# Patient Record
Sex: FEMALE | Race: BLACK OR AFRICAN AMERICAN | NOT HISPANIC OR LATINO | Employment: UNEMPLOYED | URBAN - METROPOLITAN AREA
[De-identification: names, ages, dates, MRNs, and addresses within clinical notes are randomized per-mention and may not be internally consistent; named-entity substitution may affect disease eponyms.]

---

## 2017-05-17 ENCOUNTER — TELEPHONE (OUTPATIENT)
Dept: HEMATOLOGY/ONCOLOGY | Facility: CLINIC | Age: 42
End: 2017-05-17

## 2017-05-17 DIAGNOSIS — D47.2 GAMMOPATHY, MONOCLONAL: Primary | ICD-10-CM

## 2017-05-17 RX ORDER — CLOTRIMAZOLE 10 MG/1
LOZENGE ORAL; TOPICAL
Refills: 2 | COMMUNITY
Start: 2017-03-02 | End: 2017-05-17

## 2017-05-17 RX ORDER — HYDROCODONE BITARTRATE AND ACETAMINOPHEN 7.5; 325 MG/1; MG/1
1 TABLET ORAL EVERY 6 HOURS PRN
Qty: 60 TABLET | Refills: 0 | Status: CANCELLED | OUTPATIENT
Start: 2017-05-17

## 2017-05-17 RX ORDER — HYDROCODONE BITARTRATE AND ACETAMINOPHEN 7.5; 325 MG/1; MG/1
TABLET ORAL
Refills: 0 | COMMUNITY
Start: 2017-03-08 | End: 2017-05-17 | Stop reason: CLARIF

## 2017-05-17 NOTE — TELEPHONE ENCOUNTER
----- Message from Michelle Jacob sent at 5/17/2017 10:59 AM CDT -----  Patient requesting RX Refill on Norco 7.5mg #120. Please call when ready Call Back [phone]   Pt. Requesting a refill on her hydro/apap . Looks like you give her 2 scripts at a time. Dated 2 weeks later. Will leave on chart for you to sign.

## 2017-06-07 ENCOUNTER — OFFICE VISIT (OUTPATIENT)
Dept: HEMATOLOGY/ONCOLOGY | Facility: CLINIC | Age: 42
End: 2017-06-07
Payer: MEDICAID

## 2017-06-07 VITALS
HEART RATE: 78 BPM | RESPIRATION RATE: 18 BRPM | WEIGHT: 199 LBS | TEMPERATURE: 99 F | DIASTOLIC BLOOD PRESSURE: 78 MMHG | SYSTOLIC BLOOD PRESSURE: 102 MMHG

## 2017-06-07 DIAGNOSIS — D63.8 ANEMIA OF OTHER CHRONIC DISEASE: ICD-10-CM

## 2017-06-07 DIAGNOSIS — R93.7 ABNORMAL BONE XRAY: ICD-10-CM

## 2017-06-07 DIAGNOSIS — G89.29 CHRONIC MIDLINE LOW BACK PAIN WITHOUT SCIATICA: ICD-10-CM

## 2017-06-07 DIAGNOSIS — M54.50 CHRONIC MIDLINE LOW BACK PAIN WITHOUT SCIATICA: ICD-10-CM

## 2017-06-07 PROCEDURE — 99214 OFFICE O/P EST MOD 30 MIN: CPT | Mod: ,,, | Performed by: INTERNAL MEDICINE

## 2017-06-07 RX ORDER — DULOXETIN HYDROCHLORIDE 60 MG/1
CAPSULE, DELAYED RELEASE ORAL
COMMUNITY
Start: 2017-06-01 | End: 2018-04-25 | Stop reason: SDUPTHER

## 2017-06-07 RX ORDER — LORATADINE 10 MG/1
TABLET ORAL
COMMUNITY
Start: 2017-05-18 | End: 2018-04-25 | Stop reason: SDUPTHER

## 2017-06-07 RX ORDER — TOPIRAMATE 100 MG/1
TABLET, FILM COATED ORAL
COMMUNITY
Start: 2017-05-18 | End: 2018-04-25 | Stop reason: SDUPTHER

## 2017-06-07 RX ORDER — TRAZODONE HYDROCHLORIDE 100 MG/1
TABLET ORAL
COMMUNITY
Start: 2017-06-01 | End: 2018-04-25 | Stop reason: SDUPTHER

## 2017-06-07 NOTE — LETTER
June 7, 2017      Alexandra Guerra MD  [address]  [facility] [place] [address]           [facility] Hematology Oncology  [address] [place] [address] [place] [address]          Patient: Robina Potter   MR Number: [id number]   YOB: 1975   Date of Visit: 6/7/2017       Dear Dr. Alexandra Guerra:    Thank you for referring Robina Potter to me for evaluation. Attached you will find relevant portions of my assessment and plan of care.    If you have questions, please do not hesitate to call me. I look forward to following Robina Potter along with you.    Sincerely,    SEAN Mccain MD    Enclosure  CC:  No Recipients    If you would like to receive this communication electronically, please contact [email or web address] or [phone] to request more information on [employer] Link access.    For providers and/or their staff who would like to refer a patient to [facility], please contact us through our one-stop-shop provider referral line, [facility], at [phone].    If you feel you have received this communication in error or would no longer like to receive these types of communications, please e-mail [email or web address]

## 2017-06-08 NOTE — PROGRESS NOTES
[facility] Follow-up    Subjective:      Patient ID:   Robina Potter  41 y.o. female  1975      Chief Complaint:   Anemia, LBP    HPI:  41 y.o. female with anemia 2nd decreased cellularity of bone marrow.  Chronic low back pain sx.  Evaluation for malignancy negative.  Evaluation per Dr. Melchor negative.  GERD +.  Pyelonephritis +.  Fibroids hx.    Smoke 1/3 ppd.  Etoh no.  Job, security worker.    Cholecystectomy +, M0.    Mom SLE.  1 child cerebral palsy.              ROS:   GEN: normal without any fever, night sweats or weight loss  HEENT: normal with no HA's, sore throat, stiff neck, changes in vision  CV: normal with no CP, SOB, PND, SIMON or orthopnea  PULM: normal with no SOB, cough, hemoptysis, sputum or pleuritic pain  GI: GERD.   no abdominal pain, nausea, vomiting, constipation, diarrhea, melanotic stools, BRBPR, or hematemesis  : Pyelonephritis.   no hematuria, dysuria  BREAST: normal with no mass, discharge, pain  SKIN: normal with no rash, erythema.    Review of patient's allergies indicates:  No Known Allergies    Current Outpatient Prescriptions:     duloxetine (CYMBALTA) 60 MG capsule, , Disp: , Rfl:     hydrocodone-acetaminophen (VICODIN) 5-500 mg per tablet, , Disp: , Rfl:     loratadine (CLARITIN) 10 mg tablet, , Disp: , Rfl:     topiramate (TOPAMAX) 100 MG tablet, , Disp: , Rfl:     trazodone (DESYREL) 100 MG tablet, , Disp: , Rfl:           Objective:   Vitals:  Blood pressure 102/78, pulse 78, temperature 98.8 °F (37.1 °C), resp. rate 18, weight 90.3 kg (199 lb).    Physical Examination:   GEN: no apparent distress, comfortable; AAOx3  HEAD: atraumatic and normocephalic  EYES: no pallor, no icterus, PERRLA  ENT: OMM, no pharyngeal erythema, external ears WNL; no nasal discharge; no thrush  NECK: no masses, thyroid normal, trachea midline, no LAD/LN's, supple  CV: RRR with no murmur; normal pulse; normal S1 and S2; no pedal edema  CHEST: Normal respiratory effort; CTAB; normal  breath sounds; no wheeze or crackles  ABDOM: nontender and nondistended; soft; normal bowel sounds; no rebound/guarding  MUSC/Skeletal: ROM normal; no crepitus; joints normal; no deformities or arthropathy  EXTREM: no clubbing, cyanosis, inflammation or swelling  SKIN: no rashes, lesions, ulcers, petechiae or subcutaneous nodules  : no novak  NEURO: grossly intact; motor/sensory WNL; AAOx3; no tremors  PSYCH: normal mood, affect and behavior  LYMPH: normal cervical, supraclavicular, axillary and groin LN's      Labs:     None recently      Radiology/Diagnostic Studies:    None recently          Assessment:   (1) 41 y.o. female with chronic anemia secondary to decreased bone marrow cellularity.    (2)Chronic LBP.  Refill meds.  RTC 1 month.          1. Anemia of other chronic disease    2. Chronic midline low back pain without sciatica    3. Abnormal bone xray        Plan:       Anemia of other chronic disease    Chronic midline low back pain without sciatica    Abnormal bone xray          I have explained and the patient understands all of  the current recommendation(s). I have answered all of their questions to the best of my ability and to their complete satisfaction.

## 2017-07-12 RX ORDER — HYDROCODONE BITARTRATE AND ACETAMINOPHEN 7.5; 325 MG/1; MG/1
TABLET ORAL
COMMUNITY
Start: 2017-01-25 | End: 2018-04-25 | Stop reason: SDUPTHER

## 2017-07-12 RX ORDER — LORATADINE 10 MG/1
TABLET ORAL
COMMUNITY
Start: 2016-11-09 | End: 2022-07-07

## 2017-07-13 ENCOUNTER — OFFICE VISIT (OUTPATIENT)
Dept: HEMATOLOGY/ONCOLOGY | Facility: CLINIC | Age: 42
End: 2017-07-13
Payer: MEDICAID

## 2017-07-13 VITALS
HEART RATE: 76 BPM | WEIGHT: 217 LBS | TEMPERATURE: 97 F | SYSTOLIC BLOOD PRESSURE: 118 MMHG | DIASTOLIC BLOOD PRESSURE: 78 MMHG

## 2017-07-13 DIAGNOSIS — M54.50 CHRONIC MIDLINE LOW BACK PAIN WITHOUT SCIATICA: ICD-10-CM

## 2017-07-13 DIAGNOSIS — D63.8 ANEMIA OF OTHER CHRONIC DISEASE: Primary | ICD-10-CM

## 2017-07-13 DIAGNOSIS — G89.29 CHRONIC MIDLINE LOW BACK PAIN WITHOUT SCIATICA: ICD-10-CM

## 2017-07-13 PROCEDURE — 99214 OFFICE O/P EST MOD 30 MIN: CPT | Mod: ,,, | Performed by: INTERNAL MEDICINE

## 2017-07-14 NOTE — PROGRESS NOTES
[facility] Follow-up    Subjective:      Patient ID:   Robina Potter  41 y.o. female  1975      Chief Complaint:   Anemia, LBP    HPI:  41 y.o. female with anemia 2nd decreased cellularity of bone marrow.  Chronic low back pain sx.  Evaluation for malignancy negative.  Evaluation per Dr. Melchor negative.  GERD +.  Pyelonephritis +.  Fibroids hx.    Smoke 1/3 ppd.  Etoh no.  Job, security worker.    Cholecystectomy +, M0.    Mom SLE.  1 child cerebral palsy.      ROS:   GEN: normal without any fever, night sweats or weight loss  HEENT: normal with no HA's, sore throat, stiff neck, changes in vision  CV: normal with no CP, SOB, PND, SIMON or orthopnea  PULM: normal with no SOB, cough, hemoptysis, sputum or pleuritic pain  GI: GERD.   no abdominal pain, nausea, vomiting, constipation, diarrhea, melanotic stools, BRBPR, or hematemesis  : Pyelonephritis.   no hematuria, dysuria  BREAST: normal with no mass, discharge, pain  SKIN: normal with no rash, erythema.    Review of patient's allergies indicates:  No Known Allergies    Current Outpatient Prescriptions:     duloxetine (CYMBALTA) 60 MG capsule, , Disp: , Rfl:     hydrocodone-acetaminophen (VICODIN) 5-500 mg per tablet, , Disp: , Rfl:     hydrocodone-acetaminophen 7.5-325mg (NORCO) 7.5-325 mg per tablet, , Disp: , Rfl:     loratadine (CLARITIN) 10 mg tablet, , Disp: , Rfl:     loratadine (CLARITIN) 10 mg tablet, , Disp: , Rfl:     topiramate (TOPAMAX) 100 MG tablet, , Disp: , Rfl:     trazodone (DESYREL) 100 MG tablet, , Disp: , Rfl:           Objective:   Vitals:  Blood pressure 118/78, pulse 76, temperature 97.3 °F (36.3 °C), weight 98.4 kg (217 lb).    Physical Examination:   GEN: no apparent distress, comfortable; AAOx3  HEAD: atraumatic and normocephalic  EYES: no pallor, no icterus, PERRLA  ENT: OMM, no pharyngeal erythema, external ears WNL; no nasal discharge; no thrush  NECK: no masses, thyroid normal, trachea midline, no LAD/LN's,  supple  CV: RRR with no murmur; normal pulse; normal S1 and S2; no pedal edema  CHEST: Normal respiratory effort; CTAB; normal breath sounds; no wheeze or crackles  ABDOM: nontender and nondistended; soft; normal bowel sounds; no rebound/guarding  MUSC/Skeletal: ROM normal; no crepitus; joints normal; no deformities or arthropathy  EXTREM: no clubbing, cyanosis, inflammation or swelling  SKIN: no rashes, lesions, ulcers, petechiae or subcutaneous nodules  : no novak  NEURO: grossly intact; motor/sensory WNL; AAOx3; no tremors  PSYCH: normal mood, affect and behavior  LYMPH: normal cervical, supraclavicular, axillary and groin LN's      Labs:     None recently      Radiology/Diagnostic Studies:    None recently          Assessment:   (1) 41 y.o. female with chronic anemia secondary to decreased bone marrow cellularity.    (2)Chronic LBP.  Refill meds.  RTC 1 month.             Plan:       I have explained and the patient understands all of  the current recommendation(s). I have answered all of their questions to the best of my ability and to their complete satisfaction.

## 2017-08-30 ENCOUNTER — OFFICE VISIT (OUTPATIENT)
Dept: HEMATOLOGY/ONCOLOGY | Facility: CLINIC | Age: 42
End: 2017-08-30
Payer: MEDICAID

## 2017-08-30 VITALS
SYSTOLIC BLOOD PRESSURE: 106 MMHG | WEIGHT: 219 LBS | RESPIRATION RATE: 20 BRPM | DIASTOLIC BLOOD PRESSURE: 64 MMHG | HEART RATE: 69 BPM | TEMPERATURE: 97 F

## 2017-08-30 DIAGNOSIS — M54.50 CHRONIC MIDLINE LOW BACK PAIN WITHOUT SCIATICA: ICD-10-CM

## 2017-08-30 DIAGNOSIS — G89.29 CHRONIC MIDLINE LOW BACK PAIN WITHOUT SCIATICA: ICD-10-CM

## 2017-08-30 DIAGNOSIS — R93.7 ABNORMAL BONE XRAY: ICD-10-CM

## 2017-08-30 DIAGNOSIS — D63.8 ANEMIA OF OTHER CHRONIC DISEASE: Primary | ICD-10-CM

## 2017-08-30 PROCEDURE — 99213 OFFICE O/P EST LOW 20 MIN: CPT | Mod: ,,, | Performed by: INTERNAL MEDICINE

## 2017-08-30 RX ORDER — FLUTICASONE PROPIONATE 50 MCG
SPRAY, SUSPENSION (ML) NASAL
COMMUNITY
Start: 2017-08-23 | End: 2018-04-25 | Stop reason: SDUPTHER

## 2017-09-03 NOTE — PROGRESS NOTES
[facility] Follow-up    Subjective:      Patient ID:   Robina Potter  41 y.o. female  1975      Chief Complaint:   Anemia, LBP    HPI:  41 y.o. female with anemia 2nd decreased cellularity of bone marrow.  Chronic low back pain sx.  Evaluation for malignancy negative.  Evaluation per Dr. Melchor negative.  GERD +.  Pyelonephritis +.  Fibroids hx.    Smoke 1/3 ppd.  Etoh no.  Job, security worker.    Cholecystectomy +, M0.    Mom SLE.  1 child cerebral palsy.    Being evaluated per  MD for recurrant UTIs.  Likely will come to cystoscopy.      ROS:   GEN: normal without any fever, night sweats or weight loss  HEENT: normal with no HA's, sore throat, stiff neck, changes in vision  CV: normal with no CP, SOB, PND, SIMON or orthopnea  PULM: normal with no SOB, cough, hemoptysis, sputum or pleuritic pain  GI: GERD.   no abdominal pain, nausea, vomiting, constipation, diarrhea, melanotic stools, BRBPR, or hematemesis  : Pyelonephritis.   no hematuria, dysuria  BREAST: normal with no mass, discharge, pain  SKIN: normal with no rash, erythema.    Review of patient's allergies indicates:  No Known Allergies    Current Outpatient Prescriptions:     duloxetine (CYMBALTA) 60 MG capsule, , Disp: , Rfl:     fluticasone (FLONASE) 50 mcg/actuation nasal spray, , Disp: , Rfl:     hydrocodone-acetaminophen (VICODIN) 5-500 mg per tablet, , Disp: , Rfl:     hydrocodone-acetaminophen 7.5-325mg (NORCO) 7.5-325 mg per tablet, , Disp: , Rfl:     loratadine (CLARITIN) 10 mg tablet, , Disp: , Rfl:     loratadine (CLARITIN) 10 mg tablet, , Disp: , Rfl:     trazodone (DESYREL) 100 MG tablet, , Disp: , Rfl:     topiramate (TOPAMAX) 100 MG tablet, , Disp: , Rfl:           Objective:   Vitals:  Blood pressure 106/64, pulse 69, temperature 97.2 °F (36.2 °C), resp. rate 20, weight 99.3 kg (219 lb).    Physical Examination:   GEN: no apparent distress, comfortable; AAOx3  HEAD: atraumatic and normocephalic  EYES: no pallor, no  icterus, PERRLA  ENT: OMM, no pharyngeal erythema, external ears WNL; no nasal discharge; no thrush  NECK: no masses, thyroid normal, trachea midline, no LAD/LN's, supple  CV: RRR with no murmur; normal pulse; normal S1 and S2; no pedal edema  CHEST: Normal respiratory effort; CTAB; normal breath sounds; no wheeze or crackles  ABDOM: nontender and nondistended; soft; normal bowel sounds; no rebound/guarding  MUSC/Skeletal: ROM normal; no crepitus; joints normal; no deformities or arthropathy  EXTREM: no clubbing, cyanosis, inflammation or swelling  SKIN: no rashes, lesions, ulcers, petechiae or subcutaneous nodules  : no CVAT.  NEURO: grossly intact; motor/sensory WNL; AAOx3; no tremors  PSYCH: normal mood, affect and behavior  LYMPH: normal cervical, supraclavicular, axillary and groin LN's      Labs:     None recently      Radiology/Diagnostic Studies:    None recently          Assessment:   (1) 41 y.o. female with chronic anemia secondary to decreased bone marrow cellularity.    (2)Chronic LBP.  Refill meds.  RTC 1 months.    (3)Hx of pyelonephritis, recurrent UTIs.   evaluation pending.       Plan:     II have explained and the patient understands all of  the current recommendation(s). I have answered all of their questions to the best of my ability and to their complete satisfaction.

## 2017-09-28 ENCOUNTER — OFFICE VISIT (OUTPATIENT)
Dept: HEMATOLOGY/ONCOLOGY | Facility: CLINIC | Age: 42
End: 2017-09-28
Payer: MEDICAID

## 2017-09-28 VITALS
RESPIRATION RATE: 18 BRPM | TEMPERATURE: 98 F | DIASTOLIC BLOOD PRESSURE: 70 MMHG | WEIGHT: 213 LBS | HEART RATE: 74 BPM | SYSTOLIC BLOOD PRESSURE: 106 MMHG

## 2017-09-28 DIAGNOSIS — C82.91 NODULAR LYMPHOMA OF LYMPH NODES OF HEAD, FACE AND NECK: ICD-10-CM

## 2017-09-28 PROCEDURE — 99213 OFFICE O/P EST LOW 20 MIN: CPT | Mod: ,,, | Performed by: INTERNAL MEDICINE

## 2017-09-28 RX ORDER — CETIRIZINE HYDROCHLORIDE 10 MG/1
TABLET ORAL
COMMUNITY
Start: 2016-10-27 | End: 2022-07-07

## 2017-09-28 RX ORDER — HYDROCODONE BITARTRATE AND ACETAMINOPHEN 7.5; 325 MG/1; MG/1
TABLET ORAL
COMMUNITY
Start: 2017-01-25 | End: 2018-04-25 | Stop reason: SDUPTHER

## 2017-09-28 RX ORDER — CLOTRIMAZOLE 10 MG/1
LOZENGE ORAL; TOPICAL
COMMUNITY
Start: 2017-03-02 | End: 2022-07-07

## 2017-09-28 RX ORDER — TRAZODONE HYDROCHLORIDE 100 MG/1
TABLET ORAL
COMMUNITY
Start: 2017-08-01 | End: 2022-07-07

## 2017-09-28 RX ORDER — DULOXETIN HYDROCHLORIDE 60 MG/1
CAPSULE, DELAYED RELEASE ORAL
COMMUNITY
Start: 2017-08-01 | End: 2018-04-25 | Stop reason: SDUPTHER

## 2017-09-28 RX ORDER — FLUTICASONE PROPIONATE 50 MCG
SPRAY, SUSPENSION (ML) NASAL
COMMUNITY
Start: 2017-01-11 | End: 2022-07-07

## 2017-09-28 RX ORDER — TOPIRAMATE 100 MG/1
TABLET, FILM COATED ORAL
COMMUNITY
Start: 2017-05-18 | End: 2022-07-07

## 2017-10-07 PROBLEM — C82.91 NODULAR LYMPHOMA OF LYMPH NODES OF HEAD, FACE AND NECK: Status: ACTIVE | Noted: 2017-10-07

## 2017-10-08 NOTE — PROGRESS NOTES
[facility] Follow-up    Subjective:      Patient ID:   Robina Potter  42 y.o. female  1975      Chief Complaint:   Anemia, LBP    HPI:  42 y.o. female with anemia 2nd decreased cellularity of bone marrow.  Chronic low back pain sx.  Evaluation for malignancy negative.  Evaluation per Dr. Melchor negative.  GERD +.  Pyelonephritis +.  Fibroids hx.    Smoke 1/3 ppd.  Etoh no.  Job, security worker.    Cholecystectomy +, M0.    Mom SLE.  1 child cerebral palsy.    Being evaluated per  MD for recurrant UTIs.  Likely will come to cystoscopy.      ROS:   GEN: normal without any fever, night sweats or weight loss  HEENT: normal with no HA's, sore throat, stiff neck, changes in vision  CV: normal with no CP, SOB, PND, SIMON or orthopnea  PULM: normal with no SOB, cough, hemoptysis, sputum or pleuritic pain  GI: GERD.   no abdominal pain, nausea, vomiting, constipation, diarrhea, melanotic stools, BRBPR, or hematemesis  : Pyelonephritis.   no hematuria, dysuria  BREAST: normal with no mass, discharge, pain  SKIN: normal with no rash, erythema.    Review of patient's allergies indicates:  No Known Allergies    Current Outpatient Prescriptions:     cetirizine (ZYRTEC) 10 MG tablet, , Disp: , Rfl:     fluticasone (FLONASE) 50 mcg/actuation nasal spray, , Disp: , Rfl:     hydrocodone-acetaminophen 7.5-325mg (NORCO) 7.5-325 mg per tablet, , Disp: , Rfl:     loratadine (CLARITIN) 10 mg tablet, , Disp: , Rfl:     topiramate (TOPAMAX) 100 MG tablet, , Disp: , Rfl:     trazodone (DESYREL) 100 MG tablet, , Disp: , Rfl:     clotrimazole (MYCELEX) 10 mg lazaro, , Disp: , Rfl:     duloxetine (CYMBALTA) 60 MG capsule, , Disp: , Rfl:     duloxetine (CYMBALTA) 60 MG capsule, , Disp: , Rfl:     fluticasone (FLONASE) 50 mcg/actuation nasal spray, , Disp: , Rfl:     hydrocodone-acetaminophen (VICODIN) 5-500 mg per tablet, , Disp: , Rfl:     hydrocodone-acetaminophen 7.5-325mg (NORCO) 7.5-325 mg per tablet, , Disp: ,  Rfl:     loratadine (CLARITIN) 10 mg tablet, , Disp: , Rfl:     topiramate (TOPAMAX) 100 MG tablet, , Disp: , Rfl:     trazodone (DESYREL) 100 MG tablet, , Disp: , Rfl:           Objective:   Vitals:  Blood pressure 106/70, pulse 74, temperature 98.3 °F (36.8 °C), resp. rate 18, weight 96.6 kg (213 lb).    Physical Examination:   GEN: no apparent distress, comfortable  HEAD: atraumatic and normocephalic  EYES: no pallor, no icterus  ENT: no pharyngeal erythema, external ears WNL; no nasal discharge  NECK: no masses, thyroid normal, trachea midline, no LAD/LN's, supple  CV: RRR with no murmur; normal pulse; normal S1 and S2; no pedal edema  CHEST: Normal respiratory effort; CTAB; normal breath sounds; no wheeze or crackles  ABDOM: nontender and nondistended; soft; normal bowel sounds; no rebound/guarding  MUSC/Skeletal: ROM normal; no crepitus; joints normal; no deformities or arthropathy  EXTREM: no clubbing, cyanosis, inflammation   SKIN: no rashes, lesions, ulcers, petechiae  : no CVAT.  NEURO: grossly intact; motor/sensory WNL; no tremors  PSYCH: normal mood, affect and behavior  LYMPH: normal cervical, supraclavicular, axillary and groin LN's      Labs:     None recently      Radiology/Diagnostic Studies:    None recently          Assessment:   (1) 42 y.o. female with chronic anemia secondary to decreased bone marrow cellularity.    (2)Chronic LBP.  Refill meds.  RTC 1 months.    (3)Hx of pyelonephritis, recurrent UTIs.   evaluation pending.

## 2017-11-22 ENCOUNTER — OFFICE VISIT (OUTPATIENT)
Dept: HEMATOLOGY/ONCOLOGY | Facility: CLINIC | Age: 42
End: 2017-11-22
Payer: MEDICAID

## 2017-11-22 DIAGNOSIS — N39.0 RECURRENT UTI (URINARY TRACT INFECTION): ICD-10-CM

## 2017-11-22 PROCEDURE — 99213 OFFICE O/P EST LOW 20 MIN: CPT | Mod: ,,, | Performed by: INTERNAL MEDICINE

## 2017-11-22 RX ORDER — DULOXETIN HYDROCHLORIDE 60 MG/1
60 CAPSULE, DELAYED RELEASE ORAL
COMMUNITY
Start: 2017-10-10 | End: 2018-04-25 | Stop reason: SDUPTHER

## 2017-11-22 RX ORDER — HYDROXYZINE PAMOATE 25 MG/1
25 CAPSULE ORAL
COMMUNITY
Start: 2017-10-10 | End: 2018-04-25 | Stop reason: SDUPTHER

## 2017-11-23 NOTE — PROGRESS NOTES
[facility] Follow-up    Subjective:      Patient ID:   Robina Potter  42 y.o. female  1975      Chief Complaint:   Anemia, LBP    HPI:  42 y.o. female with anemia 2nd decreased cellularity of bone marrow.  Chronic low back pain sx.  Evaluation for malignancy negative.  Evaluation per Dr. Melchor negative.  GERD +.  Pyelonephritis +.  Fibroids hx.  Recurrant UTI.  Cystoscopy done, results pending.  Dr. Strong treated her for depression.    Smoke 1/3 ppd.  Etoh no.  Job, security worker.    Cholecystectomy +, M0.    Mom SLE.  1 child cerebral palsy.    Being evaluated per  MD for recurrant UTIs. Cystoscopy results pending.      ROS:   GEN: normal without any fever, night sweats or weight loss  HEENT: normal with no HA's, sore throat, stiff neck, changes in vision  CV: normal with no CP, SOB, PND, SIMON or orthopnea  PULM: normal with no SOB, cough, hemoptysis, sputum or pleuritic pain  GI: GERD.   no abdominal pain, nausea, vomiting, constipation, diarrhea, melanotic stools, BRBPR, or hematemesis  : Pyelonephritis hx. SEE HPI.  BREAST: normal with no mass, discharge, pain  SKIN: normal with no rash, erythema.    Review of patient's allergies indicates:  No Known Allergies    Current Outpatient Prescriptions:     DULoxetine (CYMBALTA) 60 MG capsule, Take 60 mg by mouth., Disp: , Rfl:     hydrOXYzine pamoate (VISTARIL) 25 MG Cap, Take 25 mg by mouth., Disp: , Rfl:     cetirizine (ZYRTEC) 10 MG tablet, , Disp: , Rfl:     clotrimazole (MYCELEX) 10 mg lazaro, , Disp: , Rfl:     duloxetine (CYMBALTA) 60 MG capsule, , Disp: , Rfl:     duloxetine (CYMBALTA) 60 MG capsule, , Disp: , Rfl:     fluticasone (FLONASE) 50 mcg/actuation nasal spray, , Disp: , Rfl:     fluticasone (FLONASE) 50 mcg/actuation nasal spray, , Disp: , Rfl:     hydrocodone-acetaminophen (VICODIN) 5-500 mg per tablet, , Disp: , Rfl:     hydrocodone-acetaminophen 7.5-325mg (NORCO) 7.5-325 mg per tablet, , Disp: , Rfl:      hydrocodone-acetaminophen 7.5-325mg (NORCO) 7.5-325 mg per tablet, , Disp: , Rfl:     hydrOXYzine pamoate (VISTARIL) 25 MG Cap, , Disp: , Rfl:     loratadine (CLARITIN) 10 mg tablet, , Disp: , Rfl:     loratadine (CLARITIN) 10 mg tablet, , Disp: , Rfl:     sulfamethoxazole-trimethoprim 800-160mg (BACTRIM DS) 800-160 mg Tab, , Disp: , Rfl:     topiramate (TOPAMAX) 100 MG tablet, , Disp: , Rfl:     topiramate (TOPAMAX) 100 MG tablet, , Disp: , Rfl:     trazodone (DESYREL) 100 MG tablet, , Disp: , Rfl:     trazodone (DESYREL) 100 MG tablet, , Disp: , Rfl:           Objective:   Vitals:  There were no vitals taken for this visit.    Physical Examination:   GEN: no apparent distress, comfortable  HEAD: atraumatic and normocephalic  EYES: no pallor, no icterus  ENT: no pharyngeal erythema, external ears WNL; no nasal discharge  NECK: no masses, thyroid normal, trachea midline, no LAD/LN's, supple  CV: RRR with no murmur; normal pulse; normal S1 and S2; no pedal edema  CHEST: Normal respiratory effort; CTAB; normal breath sounds; no wheeze or crackles  ABDOM: nontender and nondistended; soft; normal bowel sounds; no rebound/guarding  MUSC/Skeletal: ROM normal; no crepitus; joints normal; no deformities or arthropathy  EXTREM: no clubbing, cyanosis, inflammation   SKIN: no rashes, lesions, ulcers, petechiae  : no CVAT.  NEURO: grossly intact; motor/sensory WNL; no tremors  PSYCH: normal mood, affect and behavior  LYMPH: normal cervical, supraclavicular, axillary and groin LN's      Labs:     None recently      Radiology/Diagnostic Studies:    None recently          Assessment:   (1) 42 y.o. female with chronic anemia secondary to decreased bone marrow cellularity.    (2)Chronic LBP.  Refill meds.  RTC 1 months.    (3)Hx of pyelonephritis, recurrent UTIs.   evaluation pending.

## 2017-12-06 VITALS
RESPIRATION RATE: 20 BRPM | DIASTOLIC BLOOD PRESSURE: 64 MMHG | TEMPERATURE: 98 F | HEART RATE: 74 BPM | WEIGHT: 212 LBS | SYSTOLIC BLOOD PRESSURE: 99 MMHG

## 2017-12-27 ENCOUNTER — OFFICE VISIT (OUTPATIENT)
Dept: HEMATOLOGY/ONCOLOGY | Facility: CLINIC | Age: 42
End: 2017-12-27
Payer: MEDICAID

## 2017-12-27 VITALS
DIASTOLIC BLOOD PRESSURE: 86 MMHG | TEMPERATURE: 98 F | HEART RATE: 64 BPM | RESPIRATION RATE: 18 BRPM | SYSTOLIC BLOOD PRESSURE: 127 MMHG | WEIGHT: 225 LBS

## 2017-12-27 DIAGNOSIS — G89.29 CHRONIC MIDLINE LOW BACK PAIN WITHOUT SCIATICA: ICD-10-CM

## 2017-12-27 DIAGNOSIS — R93.7 ABNORMAL BONE XRAY: ICD-10-CM

## 2017-12-27 DIAGNOSIS — M54.50 CHRONIC MIDLINE LOW BACK PAIN WITHOUT SCIATICA: ICD-10-CM

## 2017-12-27 DIAGNOSIS — D63.8 ANEMIA, CHRONIC DISEASE: Primary | ICD-10-CM

## 2017-12-27 DIAGNOSIS — N39.0 RECURRENT UTI (URINARY TRACT INFECTION): ICD-10-CM

## 2017-12-27 PROCEDURE — 99213 OFFICE O/P EST LOW 20 MIN: CPT | Mod: ,,, | Performed by: INTERNAL MEDICINE

## 2017-12-27 NOTE — LETTER
December 27, 2017      Alexandra Guerra MD  [address]  [place]  [place] [address]           [facility] Hematology Oncology  [address]  [place] [address]  Phone: [phone]  Fax: [phone]          Patient: Robina Potter   MR Number: [id number]   YOB: 1975   Date of Visit: 12/27/2017       Dear Dr. Alexandra uGerra:    Thank you for referring Robina Potter to me for evaluation. Attached you will find relevant portions of my assessment and plan of care.    If you have questions, please do not hesitate to call me. I look forward to following Robina Potter along with you.    Sincerely,    SEAN Mccain MD    Enclosure  CC:  No Recipients    If you would like to receive this communication electronically, please contact [email or web address] or [phone] to request more information on [employer] Link access.    For providers and/or their staff who would like to refer a patient to [facility], please contact us through our one-stop-shop provider referral line, [facility], at [phone].    If you feel you have received this communication in error or would no longer like to receive these types of communications, please e-mail [email or web address]

## 2017-12-27 NOTE — PROGRESS NOTES
[facility] Follow-up    Subjective:      Patient ID:   Robina Potter                                       [address]  42 y.o. female                                           [place] [address]  1975  Dev Strong    Chief Complaint:   Anemia, LBP    HPI:  42 y.o. female with anemia 2nd decreased cellularity of bone marrow.  Chronic low back pain sx.  Evaluation for malignancy negative.  Evaluation per Dr. Melchor negative.  GERD +.  Pyelonephritis +.  Fibroids hx.  Recurrant UTI.  Cystoscopy done, results pending.  Dr. Méndez follow up 18.  Dr. Strong treated her for depression.    Smoke 1/3 ppd.  Etoh no.  Job, security worker.    Cholecystectomy +, M0.    Mom SLE.  1 child cerebral palsy.      ROS:   GEN: normal without any fever, night sweats or weight loss  HEENT: normal with no HA's, sore throat, stiff neck, changes in vision  CV: normal with no CP, SOB, PND, SIMON or orthopnea  PULM: normal with no SOB, cough, hemoptysis, sputum or pleuritic pain  GI: GERD.   no abdominal pain, nausea, vomiting, constipation, diarrhea, melanotic stools, BRBPR, or hematemesis  : Pyelonephritis hx. SEE HPI.  BREAST: normal with no mass, discharge, pain  SKIN: normal with no rash, erythema.    Review of patient's allergies indicates:  No Known Allergies    Current Outpatient Prescriptions:     cetirizine (ZYRTEC) 10 MG tablet, , Disp: , Rfl:     clotrimazole (MYCELEX) 10 mg lazaro, , Disp: , Rfl:     duloxetine (CYMBALTA) 60 MG capsule, , Disp: , Rfl:     duloxetine (CYMBALTA) 60 MG capsule, , Disp: , Rfl:     DULoxetine (CYMBALTA) 60 MG capsule, Take 60 mg by mouth., Disp: , Rfl:     fluticasone (FLONASE) 50 mcg/actuation nasal spray, , Disp: , Rfl:     fluticasone (FLONASE) 50 mcg/actuation nasal spray, , Disp: , Rfl:     hydrocodone-acetaminophen (VICODIN) 5-500 mg per tablet, , Disp: , Rfl:     hydrocodone-acetaminophen 7.5-325mg (NORCO) 7.5-325 mg per tablet, , Disp: , Rfl:      hydrocodone-acetaminophen 7.5-325mg (NORCO) 7.5-325 mg per tablet, , Disp: , Rfl:     hydrOXYzine pamoate (VISTARIL) 25 MG Cap, , Disp: , Rfl:     hydrOXYzine pamoate (VISTARIL) 25 MG Cap, Take 25 mg by mouth., Disp: , Rfl:     loratadine (CLARITIN) 10 mg tablet, , Disp: , Rfl:     loratadine (CLARITIN) 10 mg tablet, , Disp: , Rfl:     sulfamethoxazole-trimethoprim 800-160mg (BACTRIM DS) 800-160 mg Tab, , Disp: , Rfl:     topiramate (TOPAMAX) 100 MG tablet, , Disp: , Rfl:     topiramate (TOPAMAX) 100 MG tablet, , Disp: , Rfl:     trazodone (DESYREL) 100 MG tablet, , Disp: , Rfl:     trazodone (DESYREL) 100 MG tablet, , Disp: , Rfl:           Objective:   Vitals:  There were no vitals taken for this visit.    Physical Examination:   GEN: no apparent distress, comfortable  HEAD: atraumatic and normocephalic  EYES: no pallor, no icterus  ENT: no pharyngeal erythema, external ears WNL; no nasal discharge  NECK: no masses, thyroid normal, trachea midline, no LAD/LN's, supple  CV: RRR with no murmur; normal pulse; normal S1 and S2; no pedal edema  CHEST: Normal respiratory effort; CTAB; normal breath sounds; no wheeze or crackles  ABDOM: nontender and nondistended; soft; normal bowel sounds; no rebound/guarding  MUSC/Skeletal: ROM normal; no crepitus; joints normal; no deformities or arthropathy  EXTREM: no clubbing, cyanosis, inflammation   SKIN: no rashes, lesions, ulcers, petechiae  : no CVAT.  NEURO: grossly intact; motor/sensory WNL; no tremors  PSYCH: normal mood, affect and behavior  LYMPH: normal cervical, supraclavicular, axillary and groin LN's      Labs:     None recently      Radiology/Diagnostic Studies:  pending        Assessment:   (1) 42 y.o. female with chronic anemia secondary to decreased bone marrow cellularity.    (2)Chronic LBP.  Refill meds.  RTC 1 months.    (3)Hx of pyelonephritis, recurrent UTIs.   evaluation pending.

## 2018-01-25 ENCOUNTER — OFFICE VISIT (OUTPATIENT)
Dept: HEMATOLOGY/ONCOLOGY | Facility: CLINIC | Age: 43
End: 2018-01-25
Payer: MEDICAID

## 2018-01-25 VITALS
WEIGHT: 216 LBS | HEART RATE: 82 BPM | SYSTOLIC BLOOD PRESSURE: 118 MMHG | DIASTOLIC BLOOD PRESSURE: 70 MMHG | TEMPERATURE: 98 F | RESPIRATION RATE: 20 BRPM

## 2018-01-25 DIAGNOSIS — D63.8 ANEMIA IN OTHER CHRONIC DISEASES CLASSIFIED ELSEWHERE: Primary | ICD-10-CM

## 2018-01-25 PROCEDURE — 99213 OFFICE O/P EST LOW 20 MIN: CPT | Mod: ,,, | Performed by: INTERNAL MEDICINE

## 2018-01-25 NOTE — PROGRESS NOTES
[facility] Follow-up    Subjective:      Patient ID:   Robina Potter                                       [address]  42 y.o. female                                           [place] [address]  1975  Dev Strong    Chief Complaint:   Anemia, LBP    HPI:  42 y.o. female with anemia 2nd decreased cellularity of bone marrow.  Chronic low back pain sx.  Evaluation for malignancy negative.  Evaluation per Dr. Melchor negative.  GERD +.  Pyelonephritis +.  Fibroids hx.  Recurrant UTI.  Cystoscopy done, results pending.  Dr. Méndez follow up 18.  Dr. Strong treated her for depression.  She is to follow-up with PMD for hernia.   She had a viral syndrome with nausea, vomiting, diarrhea in 2017, symptoms have resolved..    Smoke 1/3 ppd.  Etoh no.  Job, security worker.    Cholecystectomy +, M0.    Mom SLE.  1 child cerebral palsy.      ROS:   GEN: normal without any fever, night sweats or weight loss  HEENT: normal with no HA's, sore throat, stiff neck, changes in vision  CV: normal with no CP, SOB, PND, SIMON or orthopnea  PULM: normal with no SOB, cough, hemoptysis, sputum or pleuritic pain  GI: GERD.  See history of present illness.  : Pyelonephritis hx. SEE HPI.  BREAST: normal with no mass, discharge, pain  SKIN: normal with no rash, erythema.    Review of patient's allergies indicates:  No Known Allergies    Current Outpatient Prescriptions:     cetirizine (ZYRTEC) 10 MG tablet, , Disp: , Rfl:     clotrimazole (MYCELEX) 10 mg lazaro, , Disp: , Rfl:     duloxetine (CYMBALTA) 60 MG capsule, , Disp: , Rfl:     fluticasone (FLONASE) 50 mcg/actuation nasal spray, , Disp: , Rfl:     hydrocodone-acetaminophen 7.5-325mg (NORCO) 7.5-325 mg per tablet, , Disp: , Rfl:     hydrOXYzine pamoate (VISTARIL) 25 MG Cap, , Disp: , Rfl:     loratadine (CLARITIN) 10 mg tablet, , Disp: , Rfl:     topiramate (TOPAMAX) 100 MG tablet, , Disp: , Rfl:     trazodone (DESYREL) 100 MG tablet, , Disp: , Rfl:      duloxetine (CYMBALTA) 60 MG capsule, , Disp: , Rfl:     DULoxetine (CYMBALTA) 60 MG capsule, Take 60 mg by mouth., Disp: , Rfl:     fluticasone (FLONASE) 50 mcg/actuation nasal spray, , Disp: , Rfl:     hydrocodone-acetaminophen (VICODIN) 5-500 mg per tablet, , Disp: , Rfl:     hydrocodone-acetaminophen 7.5-325mg (NORCO) 7.5-325 mg per tablet, , Disp: , Rfl:     hydrOXYzine pamoate (VISTARIL) 25 MG Cap, Take 25 mg by mouth., Disp: , Rfl:     loratadine (CLARITIN) 10 mg tablet, , Disp: , Rfl:     sulfamethoxazole-trimethoprim 800-160mg (BACTRIM DS) 800-160 mg Tab, , Disp: , Rfl:     topiramate (TOPAMAX) 100 MG tablet, , Disp: , Rfl:     trazodone (DESYREL) 100 MG tablet, , Disp: , Rfl:           Objective:   Vitals:  Blood pressure 118/70, pulse 82, temperature 98 °F (36.7 °C), resp. rate 20, weight 98 kg (216 lb).    Physical Examination:   GEN: no apparent distress, comfortable  HEAD: atraumatic and normocephalic  EYES: no pallor, no icterus  ENT: no pharyngeal erythema, external ears WNL; no nasal discharge  NECK: no masses, thyroid normal, trachea midline, no LAD/LN's, supple  CV: RRR with no murmur; normal pulse; normal S1 and S2; no pedal edema  CHEST: Normal respiratory effort; CTAB; normal breath sounds; no wheeze or crackles  ABDOM: nontender and nondistended; soft; normal bowel sounds; no rebound/guarding  MUSC/Skeletal: ROM normal; no crepitus; joints normal; no deformities or arthropathy  EXTREM: no clubbing, cyanosis, inflammation   SKIN: no rashes, lesions, ulcers, petechiae  : no CVAT.  NEURO: grossly intact; motor/sensory WNL; no tremors  PSYCH: normal mood, affect and behavior  LYMPH: normal cervical, supraclavicular, axillary and groin LN's      Labs:     None recently      Radiology/Diagnostic Studies:  pending        Assessment:   (1) 42 y.o. female with chronic anemia secondary to decreased bone marrow cellularity.    (2)Chronic LBP.  Refill meds.  RTC 1 months.    (3)Hx of  pyelonephritis, recurrent UTIs.   evaluation pending.

## 2018-02-22 ENCOUNTER — OFFICE VISIT (OUTPATIENT)
Dept: HEMATOLOGY/ONCOLOGY | Facility: CLINIC | Age: 43
End: 2018-02-22
Payer: MEDICAID

## 2018-02-22 VITALS
TEMPERATURE: 98 F | HEART RATE: 86 BPM | SYSTOLIC BLOOD PRESSURE: 148 MMHG | RESPIRATION RATE: 18 BRPM | WEIGHT: 218 LBS | DIASTOLIC BLOOD PRESSURE: 88 MMHG

## 2018-02-22 DIAGNOSIS — R93.7 ABNORMAL BONE XRAY: ICD-10-CM

## 2018-02-22 DIAGNOSIS — D63.8 ANEMIA IN OTHER CHRONIC DISEASES CLASSIFIED ELSEWHERE: Primary | ICD-10-CM

## 2018-02-22 PROCEDURE — 99213 OFFICE O/P EST LOW 20 MIN: CPT | Mod: ,,, | Performed by: INTERNAL MEDICINE

## 2018-02-22 RX ORDER — HYDROXYZINE PAMOATE 25 MG/1
CAPSULE ORAL
COMMUNITY
Start: 2018-02-22 | End: 2022-07-07

## 2018-02-22 RX ORDER — DULOXETIN HYDROCHLORIDE 60 MG/1
60 CAPSULE, DELAYED RELEASE ORAL
COMMUNITY
Start: 2018-02-22 | End: 2022-07-07

## 2018-02-22 RX ORDER — HYDROCODONE BITARTRATE AND ACETAMINOPHEN 7.5; 325 MG/1; MG/1
1 TABLET ORAL
COMMUNITY
Start: 2017-01-25 | End: 2022-02-11 | Stop reason: SDUPTHER

## 2018-02-26 NOTE — PROGRESS NOTES
[facility] Follow-up    Subjective:      Patient ID:   Robina Potter                                       [address]  42 y.o. female                                           [place] [address]  1975  Dev Strong    Chief Complaint:   Anemia, LBP    HPI:  42 y.o. female with anemia 2nd decreased cellularity of bone marrow.  Chronic low back pain sx.  Evaluation for malignancy negative.  Evaluation per Dr. Melchor negative.  GERD +.  Pyelonephritis +.  Fibroids hx.  Recurrant UTI.  Cystoscopy done.  Dr. Strong treated her for depression.  She is to follow-up with PMD for hernia.   She had a viral syndrome with nausea, vomiting, diarrhea in 2017, symptoms have resolved..  She saw Dr. Strong, may need hernia repair.  To see Dr. Spence for migraine eval?  Bladder eval negative.  Dr. Médnez.    Smoke 1/3 ppd.  Etoh no.  Job, security worker.    Cholecystectomy +, M0.    Mom SLE.  1 child cerebral palsy.      ROS:   GEN: normal without any fever, night sweats or weight loss  HEENT: normal with no HA's, sore throat, stiff neck, changes in vision  CV: normal with no CP, SOB, PND, SIMNO or orthopnea  PULM: normal with no SOB, cough, hemoptysis, sputum or pleuritic pain  GI: GERD.  See history of present illness.  : Pyelonephritis hx. SEE HPI.  BREAST: normal with no mass, discharge, pain  SKIN: normal with no rash, erythema.    Review of patient's allergies indicates:  No Known Allergies    Current Outpatient Prescriptions:     cetirizine (ZYRTEC) 10 MG tablet, , Disp: , Rfl:     duloxetine (CYMBALTA) 60 MG capsule, , Disp: , Rfl:     fluticasone (FLONASE) 50 mcg/actuation nasal spray, , Disp: , Rfl:     hydrocodone-acetaminophen 7.5-325mg (NORCO) 7.5-325 mg per tablet, , Disp: , Rfl:     hydrOXYzine pamoate (VISTARIL) 25 MG Cap, , Disp: , Rfl:     loratadine (CLARITIN) 10 mg tablet, , Disp: , Rfl:     topiramate (TOPAMAX) 100 MG tablet, , Disp: , Rfl:     trazodone (DESYREL) 100 MG  tablet, , Disp: , Rfl:     clotrimazole (MYCELEX) 10 mg lazaro, , Disp: , Rfl:     duloxetine (CYMBALTA) 60 MG capsule, , Disp: , Rfl:     DULoxetine (CYMBALTA) 60 MG capsule, Take 60 mg by mouth., Disp: , Rfl:     DULoxetine (CYMBALTA) 60 MG capsule, Take 60 mg by mouth., Disp: , Rfl:     fluticasone (FLONASE) 50 mcg/actuation nasal spray, , Disp: , Rfl:     hydrocodone-acetaminophen (VICODIN) 5-500 mg per tablet, , Disp: , Rfl:     hydrocodone-acetaminophen 7.5-325mg (NORCO) 7.5-325 mg per tablet, , Disp: , Rfl:     hydrocodone-acetaminophen 7.5-325mg (NORCO) 7.5-325 mg per tablet, Take 1 tablet by mouth., Disp: , Rfl:     hydrOXYzine pamoate (VISTARIL) 25 MG Cap, Take 25 mg by mouth., Disp: , Rfl:     hydrOXYzine pamoate (VISTARIL) 25 MG Cap, TAKE 1 CAPSULE BY MOUTH 3 TIMES DAILY AS NEEDED FOR ITCHING, Disp: , Rfl:     loratadine (CLARITIN) 10 mg tablet, , Disp: , Rfl:     sulfamethoxazole-trimethoprim 800-160mg (BACTRIM DS) 800-160 mg Tab, , Disp: , Rfl:     topiramate (TOPAMAX) 100 MG tablet, , Disp: , Rfl:     trazodone (DESYREL) 100 MG tablet, , Disp: , Rfl:           Objective:   Vitals:  Blood pressure (!) 148/88, pulse 86, temperature 97.7 °F (36.5 °C), resp. rate 18, weight 98.9 kg (218 lb).    Physical Examination:   GEN: no apparent distress, comfortable  HEAD: atraumatic and normocephalic  EYES: no pallor, no icterus  ENT: no pharyngeal erythema, external ears WNL; no nasal discharge  NECK: no masses, thyroid normal, trachea midline, no LAD/LN's, supple  CV: RRR with no murmur; normal pulse; normal S1 and S2; no pedal edema  CHEST: Normal respiratory effort; CTAB; normal breath sounds; no wheeze or crackles  ABDOM: nontender and nondistended; soft; normal bowel sounds; no rebound/guarding  MUSC/Skeletal: ROM normal; no crepitus; joints normal; no deformities or arthropathy  EXTREM: no clubbing, cyanosis, inflammation   SKIN: no rashes, lesions, ulcers, petechiae  : no CVAT.  NEURO:  grossly intact; motor/sensory WNL; no tremors  PSYCH: normal mood, affect and behavior  LYMPH: normal cervical, supraclavicular, axillary and groin LN's      Labs:     None recently      Radiology/Diagnostic Studies:    Mamm 1/2018 negative.  Bone marrow 2014, 25% cellularity.  IgG 2,000.        Assessment:   (1) 42 y.o. female with chronic anemia secondary to decreased bone marrow cellularity.    (2)Chronic LBP.  Refill meds.  RTC 1 months.    (3)Hx of pyelonephritis, recurrent UTIs.   evaluation negative results.

## 2018-03-21 ENCOUNTER — OFFICE VISIT (OUTPATIENT)
Dept: HEMATOLOGY/ONCOLOGY | Facility: CLINIC | Age: 43
End: 2018-03-21
Payer: MEDICAID

## 2018-03-21 VITALS
SYSTOLIC BLOOD PRESSURE: 140 MMHG | RESPIRATION RATE: 20 BRPM | DIASTOLIC BLOOD PRESSURE: 80 MMHG | HEART RATE: 82 BPM | TEMPERATURE: 98 F | WEIGHT: 220 LBS

## 2018-03-21 DIAGNOSIS — M54.50 CHRONIC MIDLINE LOW BACK PAIN WITHOUT SCIATICA: ICD-10-CM

## 2018-03-21 DIAGNOSIS — G89.29 CHRONIC MIDLINE LOW BACK PAIN WITHOUT SCIATICA: ICD-10-CM

## 2018-03-21 DIAGNOSIS — R93.7 ABNORMAL BONE XRAY: ICD-10-CM

## 2018-03-21 DIAGNOSIS — D63.8 ANEMIA IN OTHER CHRONIC DISEASES CLASSIFIED ELSEWHERE: Primary | ICD-10-CM

## 2018-03-21 DIAGNOSIS — N39.0 RECURRENT UTI (URINARY TRACT INFECTION): ICD-10-CM

## 2018-03-21 PROCEDURE — 99213 OFFICE O/P EST LOW 20 MIN: CPT | Mod: ,,, | Performed by: INTERNAL MEDICINE

## 2018-03-22 NOTE — PROGRESS NOTES
[facility] Follow-up    Subjective:      Patient ID:   Robina Potter                                       [address]  42 y.o. female                                           [place] [address]  1975  Dev Strong    Chief Complaint:   Anemia, LBP    HPI:  42 y.o. female with anemia 2nd decreased cellularity of bone marrow.  Chronic low back pain sx.  Evaluation for malignancy negative.  Evaluation per Dr. Melchor negative.  GERD +.  Pyelonephritis +.  Fibroids hx.  Recurrant UTI.  Cystoscopy done.  Dr. Strong treated her for depression.  She is to follow-up with PMD for hernia.   She had a viral syndrome with nausea, vomiting, diarrhea in 2017, symptoms have resolved..  She saw Dr. Strong, may need hernia repair.  Saw Dr. Spence for migraine eval? Sx better.  Bladder eval negative.  Dr. Méndez.    Smoke 1/3 ppd.  Etoh no.  Job, security worker.    Cholecystectomy +, M0.    Mom SLE.  1 child cerebral palsy.      ROS:   GEN: normal without any fever, night sweats or weight loss  HEENT:  HA's better  CV: normal with no CP, SOB, PND, SIMON or orthopnea  PULM: normal with no SOB, cough, hemoptysis, sputum or pleuritic pain  GI: GERD.  See history of present illness.  : Pyelonephritis hx. SEE HPI.  BREAST: normal with no mass, discharge, pain  SKIN: normal with no rash, erythema.    Review of patient's allergies indicates:  No Known Allergies    Current Outpatient Prescriptions:     cetirizine (ZYRTEC) 10 MG tablet, , Disp: , Rfl:     duloxetine (CYMBALTA) 60 MG capsule, , Disp: , Rfl:     hydrocodone-acetaminophen 7.5-325mg (NORCO) 7.5-325 mg per tablet, , Disp: , Rfl:     hydrOXYzine pamoate (VISTARIL) 25 MG Cap, , Disp: , Rfl:     topiramate (TOPAMAX) 100 MG tablet, , Disp: , Rfl:     trazodone (DESYREL) 100 MG tablet, , Disp: , Rfl:     clotrimazole (MYCELEX) 10 mg lazaro, , Disp: , Rfl:     duloxetine (CYMBALTA) 60 MG capsule, , Disp: , Rfl:     DULoxetine (CYMBALTA) 60 MG  capsule, Take 60 mg by mouth., Disp: , Rfl:     DULoxetine (CYMBALTA) 60 MG capsule, Take 60 mg by mouth., Disp: , Rfl:     fluticasone (FLONASE) 50 mcg/actuation nasal spray, , Disp: , Rfl:     fluticasone (FLONASE) 50 mcg/actuation nasal spray, , Disp: , Rfl:     hydrocodone-acetaminophen (VICODIN) 5-500 mg per tablet, , Disp: , Rfl:     hydrocodone-acetaminophen 7.5-325mg (NORCO) 7.5-325 mg per tablet, , Disp: , Rfl:     hydrocodone-acetaminophen 7.5-325mg (NORCO) 7.5-325 mg per tablet, Take 1 tablet by mouth., Disp: , Rfl:     hydrOXYzine pamoate (VISTARIL) 25 MG Cap, Take 25 mg by mouth., Disp: , Rfl:     hydrOXYzine pamoate (VISTARIL) 25 MG Cap, TAKE 1 CAPSULE BY MOUTH 3 TIMES DAILY AS NEEDED FOR ITCHING, Disp: , Rfl:     loratadine (CLARITIN) 10 mg tablet, , Disp: , Rfl:     loratadine (CLARITIN) 10 mg tablet, , Disp: , Rfl:     sulfamethoxazole-trimethoprim 800-160mg (BACTRIM DS) 800-160 mg Tab, , Disp: , Rfl:     topiramate (TOPAMAX) 100 MG tablet, , Disp: , Rfl:     trazodone (DESYREL) 100 MG tablet, , Disp: , Rfl:           Objective:   Vitals:  Blood pressure (!) 140/80, pulse 82, temperature 98.1 °F (36.7 °C), resp. rate 20, weight 99.8 kg (220 lb).    Physical Examination:   GEN: no apparent distress, comfortable  HEAD: atraumatic and normocephalic  EYES: no pallor, no icterus  ENT: no pharyngeal erythema, external ears WNL; no nasal discharge  NECK: no masses, thyroid normal, trachea midline, no LAD/LN's, supple  CV: RRR with no murmur; normal pulse; normal S1 and S2; no pedal edema  CHEST: Normal respiratory effort; CTAB; normal breath sounds; no wheeze or crackles  ABDOM: nontender and nondistended; soft; normal bowel sounds; no rebound/guarding  MUSC/Skeletal: ROM normal; no crepitus; joints normal; no deformities or arthropathy  EXTREM: no clubbing, cyanosis, inflammation   SKIN: no rashes, lesions, ulcers, petechiae  : no CVAT.  NEURO: grossly intact; motor/sensory WNL; no  tremors  PSYCH: normal mood, affect and behavior  LYMPH: normal cervical, supraclavicular, axillary and groin LN's      Labs:     None recently      Radiology/Diagnostic Studies:    Mamm 1/2018 negative.  Bone marrow 2014, 25% cellularity.  IgG 2,000.        Assessment:   (1) 42 y.o. female with chronic anemia secondary to decreased bone marrow cellularity.    (2)Chronic LBP.  Refill meds.  RTC 1 months.    (3)Hx of pyelonephritis, recurrent UTIs.   evaluation negative results.

## 2018-04-25 ENCOUNTER — OFFICE VISIT (OUTPATIENT)
Dept: HEMATOLOGY/ONCOLOGY | Facility: CLINIC | Age: 43
End: 2018-04-25
Payer: MEDICAID

## 2018-04-25 VITALS
RESPIRATION RATE: 18 BRPM | BODY MASS INDEX: 33.95 KG/M2 | SYSTOLIC BLOOD PRESSURE: 111 MMHG | TEMPERATURE: 99 F | HEART RATE: 80 BPM | HEIGHT: 68 IN | WEIGHT: 224 LBS | DIASTOLIC BLOOD PRESSURE: 80 MMHG

## 2018-04-25 DIAGNOSIS — N39.0 RECURRENT UTI (URINARY TRACT INFECTION): ICD-10-CM

## 2018-04-25 DIAGNOSIS — G89.29 CHRONIC MIDLINE LOW BACK PAIN WITHOUT SCIATICA: ICD-10-CM

## 2018-04-25 DIAGNOSIS — M54.50 CHRONIC MIDLINE LOW BACK PAIN WITHOUT SCIATICA: ICD-10-CM

## 2018-04-25 DIAGNOSIS — D63.8 ANEMIA IN OTHER CHRONIC DISEASES CLASSIFIED ELSEWHERE: Primary | ICD-10-CM

## 2018-04-25 DIAGNOSIS — R93.7 ABNORMAL BONE XRAY: ICD-10-CM

## 2018-04-25 PROCEDURE — 99213 OFFICE O/P EST LOW 20 MIN: CPT | Mod: ,,, | Performed by: INTERNAL MEDICINE

## 2018-04-25 RX ORDER — OMEPRAZOLE 20 MG/1
CAPSULE, DELAYED RELEASE ORAL
COMMUNITY
Start: 2018-03-05 | End: 2022-07-07

## 2018-04-25 NOTE — LETTER
April 28, 2018      Alexandra Guerra MD  [address]  [place] [address]           [facility]  [address]  [place] [address]  Phone: [phone]  Fax: [phone]          Patient: Robina Potter   MR Number: [id number]   YOB: 1975   Date of Visit: 4/25/2018       Dear Dr. Alexandra Guerra:    Thank you for referring Robina Potter to me for evaluation. Attached you will find relevant portions of my assessment and plan of care.    If you have questions, please do not hesitate to call me. I look forward to following Robina Potter along with you.    Sincerely,    SEAN Mccain MD    Enclosure  CC:  No Recipients    If you would like to receive this communication electronically, please contact [email or web address] or [phone] to request more information on [employer] Link access.    For providers and/or their staff who would like to refer a patient to [facility], please contact us through our one-stop-shop provider referral line, [facility], at [phone].    If you feel you have received this communication in error or would no longer like to receive these types of communications, please e-mail [email or web address]

## 2018-04-25 NOTE — PROGRESS NOTES
[facility] Follow-up    Subjective:      Patient ID:   Robina Potter                                       [address]  42 y.o. female                                           [place] [address]  1975  Dev Strong    Chief Complaint:   Anemia, LBP    HPI:  42 y.o. female with anemia 2nd decreased cellularity of bone marrow.  Chronic low back pain sx.  Evaluation for malignancy negative.  Evaluation per Dr. Melchor negative.  GERD +.  Pyelonephritis +.  Fibroids hx.  Recurrant UTI.  Cystoscopy done.  Dr. Strong treated her for depression.  She is to follow-up with PMD for hernia.   She had a viral syndrome with nausea, vomiting, diarrhea in 2017, symptoms have resolved..  She saw Dr. Strong, may need hernia repair.  Saw Dr. Spence for migraine eval? Sx better.  Bladder eval negative.  Dr. Méndez.    Smoke 1/3 ppd.  Etoh no.  Job, security worker.    Cholecystectomy +, M0.    Mom SLE.  1 child cerebral palsy.      ROS:   GEN: normal without any fever, night sweats or weight loss  HEENT:  HA's better  CV: normal with no CP, SOB, PND, SIMON or orthopnea  PULM: normal with no SOB, cough, hemoptysis, sputum or pleuritic pain  GI: GERD.  See history of present illness.  : Pyelonephritis hx. SEE HPI.  BREAST: normal with no mass, discharge, pain  SKIN: normal with no rash, erythema.    Review of patient's allergies indicates:  No Known Allergies    Current Outpatient Prescriptions:     cetirizine (ZYRTEC) 10 MG tablet, , Disp: , Rfl:     clotrimazole (MYCELEX) 10 mg lazaro, , Disp: , Rfl:     DULoxetine (CYMBALTA) 60 MG capsule, Take 60 mg by mouth., Disp: , Rfl:     fluticasone (FLONASE) 50 mcg/actuation nasal spray, , Disp: , Rfl:     hydrocodone-acetaminophen (VICODIN) 5-500 mg per tablet, , Disp: , Rfl:     hydrocodone-acetaminophen 7.5-325mg (NORCO) 7.5-325 mg per tablet, Take 1 tablet by mouth., Disp: , Rfl:     hydrOXYzine pamoate (VISTARIL) 25 MG Cap, TAKE 1 CAPSULE BY MOUTH 3  "TIMES DAILY AS NEEDED FOR ITCHING, Disp: , Rfl:     loratadine (CLARITIN) 10 mg tablet, , Disp: , Rfl:     omeprazole (PRILOSEC) 20 MG capsule, , Disp: , Rfl:     topiramate (TOPAMAX) 100 MG tablet, , Disp: , Rfl:     trazodone (DESYREL) 100 MG tablet, , Disp: , Rfl:           Objective:   Vitals:  Blood pressure 111/80, pulse 80, temperature 99.4 °F (37.4 °C), resp. rate 18, height 5' 7.5" (1.715 m), weight 101.6 kg (224 lb).    Physical Examination:   GEN: no apparent distress, comfortable  HEAD: atraumatic and normocephalic  EYES: no pallor, no icterus  ENT: no pharyngeal erythema, external ears WNL; no nasal discharge  NECK: no masses, thyroid normal, trachea midline, no LAD/LN's, supple  CV: RRR with no murmur; normal pulse; normal S1 and S2; no pedal edema  CHEST: Normal respiratory effort; CTAB; normal breath sounds; no wheeze or crackles  ABDOM: nontender and nondistended; soft; normal bowel sounds; no rebound/guarding  MUSC/Skeletal: ROM normal; no crepitus; joints normal; no deformities or arthropathy  EXTREM: no clubbing, cyanosis, inflammation   SKIN: no rashes, lesions, ulcers, petechiae  : no CVAT.  NEURO: grossly intact; motor/sensory WNL; no tremors  PSYCH: normal mood, affect and behavior  LYMPH: normal cervical, supraclavicular, axillary and groin LN's      Labs:     None recently      Radiology/Diagnostic Studies:    Mamm 1/2018 negative.  Bone marrow 2014, 25% cellularity.  IgG 2,000.        Assessment:   (1) 42 y.o. female with chronic anemia secondary to decreased bone marrow cellularity.    (2)Chronic LBP.  Refill meds.  RTC 1 months.    (3)Hx of pyelonephritis, recurrent UTIs.   evaluation negative results.                 "

## 2018-05-17 ENCOUNTER — OFFICE VISIT (OUTPATIENT)
Dept: HEMATOLOGY/ONCOLOGY | Facility: CLINIC | Age: 43
End: 2018-05-17
Payer: MEDICAID

## 2018-05-17 DIAGNOSIS — N39.0 RECURRENT UTI (URINARY TRACT INFECTION): ICD-10-CM

## 2018-05-17 DIAGNOSIS — R93.7 ABNORMAL BONE XRAY: ICD-10-CM

## 2018-05-17 DIAGNOSIS — D63.8 ANEMIA IN OTHER CHRONIC DISEASES CLASSIFIED ELSEWHERE: Primary | ICD-10-CM

## 2018-05-17 DIAGNOSIS — M54.50 CHRONIC MIDLINE LOW BACK PAIN WITHOUT SCIATICA: ICD-10-CM

## 2018-05-17 DIAGNOSIS — G89.29 CHRONIC MIDLINE LOW BACK PAIN WITHOUT SCIATICA: ICD-10-CM

## 2018-05-17 PROCEDURE — 99213 OFFICE O/P EST LOW 20 MIN: CPT | Mod: ,,, | Performed by: INTERNAL MEDICINE

## 2018-05-17 NOTE — LETTER
May 20, 2018      Alexandra Guerra MD  [address]  [address]  [place] [address]           [facility]  [address]  [place] [address]  Phone: [phone]  Fax: [phone]          Patient: Robina Potter   MR Number: [id number]   YOB: 1975   Date of Visit: 5/17/2018       Dear Dr. Alexandra Guerra:    Thank you for referring Robina Potter to me for evaluation. Attached you will find relevant portions of my assessment and plan of care.    If you have questions, please do not hesitate to call me. I look forward to following Robina Potter along with you.    Sincerely,    SEAN Mccain MD    Enclosure  CC:  No Recipients    If you would like to receive this communication electronically, please contact [email or web address] or [phone] to request more information on [employer] Link access.    For providers and/or their staff who would like to refer a patient to [facility], please contact us through our one-stop-shop provider referral line, [facility], at [phone].    If you feel you have received this communication in error or would no longer like to receive these types of communications, please e-mail [email or web address]

## 2018-05-21 NOTE — PROGRESS NOTES
[facility] Follow-up    Subjective:      Patient ID:   Robina Potter                                       [address]  42 y.o. female                                           [place] [address]  1975  Dev Strong    Chief Complaint:   Anemia, LBP    HPI:  42 y.o. female with anemia 2nd decreased cellularity of bone marrow.  Chronic low back pain sx.  Evaluation for malignancy negative.  Evaluation per Dr. Melchor negative.  GERD +.  Pyelonephritis +.  Fibroids hx.  Recurrant UTI.  Cystoscopy done.  Dr. Strong treated her for depression.  She is to follow-up with PMD for hernia.   She had a viral syndrome with nausea, vomiting, diarrhea in 2017, symptoms have resolved..  She saw Dr. Strong, may need hernia repair.  Saw Dr. Spence for migraine eval? Sx better.  Bladder eval negative.  Dr. Méndez.    Smoke 1/3 ppd.  Etoh no.  Job, security worker.    Cholecystectomy +, M0.    Mom SLE.  1 child cerebral palsy.      ROS:   GEN: normal without any fever, night sweats or weight loss  HEENT:  HA's better  CV: normal with no CP, SOB, PND, SIMON or orthopnea  PULM: normal with no SOB, cough, hemoptysis, sputum or pleuritic pain  GI: GERD.  See history of present illness.  : Pyelonephritis hx. SEE HPI.  BREAST: normal with no mass, discharge, pain  SKIN: normal with no rash, erythema.    Review of patient's allergies indicates:  No Known Allergies    Current Outpatient Prescriptions:     cetirizine (ZYRTEC) 10 MG tablet, , Disp: , Rfl:     clotrimazole (MYCELEX) 10 mg lazaro, , Disp: , Rfl:     DULoxetine (CYMBALTA) 60 MG capsule, Take 60 mg by mouth., Disp: , Rfl:     fluticasone (FLONASE) 50 mcg/actuation nasal spray, , Disp: , Rfl:     hydrocodone-acetaminophen (VICODIN) 5-500 mg per tablet, , Disp: , Rfl:     hydrocodone-acetaminophen 7.5-325mg (NORCO) 7.5-325 mg per tablet, Take 1 tablet by mouth., Disp: , Rfl:     hydrOXYzine pamoate (VISTARIL) 25 MG Cap, TAKE 1 CAPSULE BY MOUTH 3  TIMES DAILY AS NEEDED FOR ITCHING, Disp: , Rfl:     loratadine (CLARITIN) 10 mg tablet, , Disp: , Rfl:     omeprazole (PRILOSEC) 20 MG capsule, , Disp: , Rfl:     topiramate (TOPAMAX) 100 MG tablet, , Disp: , Rfl:     trazodone (DESYREL) 100 MG tablet, , Disp: , Rfl:           Objective:   Vitals:  There were no vitals taken for this visit.    Physical Examination:   GEN: no apparent distress, comfortable  HEAD: atraumatic and normocephalic  EYES: no pallor, no icterus  ENT: no pharyngeal erythema, external ears WNL; no nasal discharge  NECK: no masses, thyroid normal, trachea midline, no LAD/LN's, supple  CV: RRR with no murmur; normal pulse; normal S1 and S2; no pedal edema  CHEST: Normal respiratory effort; CTAB; normal breath sounds; no wheeze or crackles  ABDOM: nontender and nondistended; soft; normal bowel sounds; no rebound/guarding  MUSC/Skeletal: ROM normal; no crepitus; joints normal; no deformities or arthropathy  EXTREM: no clubbing, cyanosis, inflammation   SKIN: no rashes, lesions, ulcers, petechiae  : no CVAT.  NEURO: grossly intact; motor/sensory WNL; no tremors  PSYCH: normal mood, affect and behavior  LYMPH: normal cervical, supraclavicular, axillary and groin LN's      Labs:     None recently      Radiology/Diagnostic Studies:    Mamm 1/2018 negative.  Bone marrow 2014, 25% cellularity.  IgG 2,000.        Assessment:   (1) 42 y.o. female with chronic anemia secondary to decreased bone marrow cellularity.    (2)Chronic LBP.  Refill meds.  RTC 1 months.    (3)Hx of pyelonephritis, recurrent UTIs.   evaluation negative results.

## 2018-06-20 ENCOUNTER — OFFICE VISIT (OUTPATIENT)
Dept: HEMATOLOGY/ONCOLOGY | Facility: CLINIC | Age: 43
End: 2018-06-20
Payer: MEDICAID

## 2018-06-20 VITALS
HEART RATE: 80 BPM | TEMPERATURE: 99 F | DIASTOLIC BLOOD PRESSURE: 70 MMHG | WEIGHT: 218 LBS | BODY MASS INDEX: 33.04 KG/M2 | SYSTOLIC BLOOD PRESSURE: 110 MMHG | HEIGHT: 68 IN

## 2018-06-20 DIAGNOSIS — R93.7 ABNORMAL BONE XRAY: ICD-10-CM

## 2018-06-20 DIAGNOSIS — G89.29 CHRONIC MIDLINE LOW BACK PAIN WITHOUT SCIATICA: ICD-10-CM

## 2018-06-20 DIAGNOSIS — M54.50 CHRONIC MIDLINE LOW BACK PAIN WITHOUT SCIATICA: ICD-10-CM

## 2018-06-20 DIAGNOSIS — D63.8 ANEMIA IN OTHER CHRONIC DISEASES CLASSIFIED ELSEWHERE: Primary | ICD-10-CM

## 2018-06-20 DIAGNOSIS — N39.0 RECURRENT UTI (URINARY TRACT INFECTION): ICD-10-CM

## 2018-06-20 PROCEDURE — 99213 OFFICE O/P EST LOW 20 MIN: CPT | Mod: ,,, | Performed by: INTERNAL MEDICINE

## 2018-06-20 NOTE — LETTER
June 20, 2018      Alexandra Guerra MD  [address]  [address]  [place] [address]           [facility]  [address]  [place] [address]  Phone: [phone]  Fax: [phone]          Patient: Robina Potter   MR Number: [id number]   YOB: 1975   Date of Visit: 6/20/2018       Dear Dr. Alexandra Guerra:    Thank you for referring Robina Potter to me for evaluation. Attached you will find relevant portions of my assessment and plan of care.    If you have questions, please do not hesitate to call me. I look forward to following Robina Potter along with you.    Sincerely,    SEAN Mccain MD    Enclosure  CC:  No Recipients    If you would like to receive this communication electronically, please contact [email or web address] or [phone] to request more information on [employer] Link access.    For providers and/or their staff who would like to refer a patient to [facility], please contact us through our one-stop-shop provider referral line, [facility], at [phone].    If you feel you have received this communication in error or would no longer like to receive these types of communications, please e-mail [email or web address]

## 2018-06-21 NOTE — PROGRESS NOTES
[facility] Hospital Follow-up    Subjective:      Patient ID:   Robina Potter                                       [address]  42 y.o. female                                           [place] [address]  1975  Dev Strong    Chief Complaint:   Anemia, LBP    HPI:  42 y.o. female with anemia 2nd decreased cellularity of bone marrow.  Chronic low back pain sx.  Evaluation for malignancy negative.  Evaluation per Dr. Melchor negative.  GERD +.  Pyelonephritis +.  Fibroids hx.  Recurrant UTI.  Cystoscopy done.  Dr. Strong treated her for depression.  She is to follow-up with PMD for hernia.   She had a viral syndrome with nausea, vomiting, diarrhea in 2017, symptoms have resolved..  She saw Dr. Strong, may need hernia repair.  Saw Dr. Spence for migraine eval? Sx better.  Bladder eval negative.  Dr. Méndez.    Smoke 1/3 ppd.  Etoh no.  Job, security worker.    Cholecystectomy +, M0.    Mom SLE.  1 child cerebral palsy.      ROS:   GEN: normal without any fever, night sweats or weight loss  HEENT:  HA's better  CV: normal with no CP, SOB, PND, SIMON or orthopnea  PULM: normal with no SOB, cough, hemoptysis, sputum or pleuritic pain  GI: GERD.  See history of present illness.  : Pyelonephritis hx. SEE HPI.  BREAST: normal with no mass, discharge, pain  SKIN: normal with no rash, erythema.    Review of patient's allergies indicates:  No Known Allergies    Current Outpatient Prescriptions:     cetirizine (ZYRTEC) 10 MG tablet, , Disp: , Rfl:     clotrimazole (MYCELEX) 10 mg lazaro, , Disp: , Rfl:     DULoxetine (CYMBALTA) 60 MG capsule, Take 60 mg by mouth., Disp: , Rfl:     fluticasone (FLONASE) 50 mcg/actuation nasal spray, , Disp: , Rfl:     hydrocodone-acetaminophen (VICODIN) 5-500 mg per tablet, , Disp: , Rfl:     hydrocodone-acetaminophen 7.5-325mg (NORCO) 7.5-325 mg per tablet, Take 1 tablet by mouth., Disp: , Rfl:     hydrOXYzine pamoate (VISTARIL) 25 MG Cap, TAKE 1 CAPSULE BY MOUTH 3  "TIMES DAILY AS NEEDED FOR ITCHING, Disp: , Rfl:     loratadine (CLARITIN) 10 mg tablet, , Disp: , Rfl:     omeprazole (PRILOSEC) 20 MG capsule, , Disp: , Rfl:     topiramate (TOPAMAX) 100 MG tablet, , Disp: , Rfl:     trazodone (DESYREL) 100 MG tablet, , Disp: , Rfl:           Objective:   Vitals:  Blood pressure 110/70, pulse 80, temperature 98.9 °F (37.2 °C), height 5' 7.5" (1.715 m), weight 98.9 kg (218 lb).    Physical Examination:   GEN: no apparent distress, comfortable  HEAD: atraumatic and normocephalic  EYES: no pallor, no icterus  ENT: no pharyngeal erythema, external ears WNL; no nasal discharge  NECK: no masses, thyroid normal, trachea midline, no LAD/LN's, supple  CV: RRR with no murmur; normal pulse; normal S1 and S2; no pedal edema  CHEST: Normal respiratory effort; CTAB; normal breath sounds; no wheeze or crackles  ABDOM: nontender and nondistended; soft; normal bowel sounds; no rebound/guarding  MUSC/Skeletal: ROM normal; no crepitus; joints normal; no deformities or arthropathy  EXTREM: no clubbing, cyanosis, inflammation   SKIN: no rashes, lesions, ulcers, petechiae  : no CVAT.  NEURO: grossly intact; motor/sensory WNL; no tremors  PSYCH: normal mood, affect and behavior  LYMPH: normal cervical, supraclavicular, axillary and groin LN's      Labs:     None recently      Radiology/Diagnostic Studies:    Mamm 1/2018 negative.  Bone marrow 2014, 25% cellularity.  IgG 2,000.        Assessment:   (1) 42 y.o. female with chronic anemia secondary to decreased bone marrow cellularity.    (2)Chronic LBP.  Refill meds.  RTC 1 months.    (3)Hx of pyelonephritis, recurrent UTIs.   evaluation negative results.                 "

## 2018-07-19 ENCOUNTER — OFFICE VISIT (OUTPATIENT)
Dept: HEMATOLOGY/ONCOLOGY | Facility: CLINIC | Age: 43
End: 2018-07-19
Payer: MEDICAID

## 2018-07-19 VITALS
DIASTOLIC BLOOD PRESSURE: 76 MMHG | HEIGHT: 67 IN | HEART RATE: 78 BPM | TEMPERATURE: 98 F | BODY MASS INDEX: 34.53 KG/M2 | SYSTOLIC BLOOD PRESSURE: 109 MMHG | WEIGHT: 220 LBS

## 2018-07-19 DIAGNOSIS — G89.29 CHRONIC MIDLINE LOW BACK PAIN WITHOUT SCIATICA: ICD-10-CM

## 2018-07-19 DIAGNOSIS — M54.50 CHRONIC MIDLINE LOW BACK PAIN WITHOUT SCIATICA: ICD-10-CM

## 2018-07-19 DIAGNOSIS — R93.7 ABNORMAL BONE XRAY: ICD-10-CM

## 2018-07-19 DIAGNOSIS — N39.0 RECURRENT UTI (URINARY TRACT INFECTION): ICD-10-CM

## 2018-07-19 DIAGNOSIS — D63.8 ANEMIA IN OTHER CHRONIC DISEASES CLASSIFIED ELSEWHERE: Primary | ICD-10-CM

## 2018-07-19 PROCEDURE — 99213 OFFICE O/P EST LOW 20 MIN: CPT | Mod: ,,, | Performed by: INTERNAL MEDICINE

## 2018-07-28 NOTE — PROGRESS NOTES
[facility] Hospital Follow-up    Subjective:      Patient ID:   Robina Potter                                       [address]  42 y.o. female                                           [place] [address]  1975  Dev Strong    Chief Complaint:   Anemia, LBP    HPI:  42 y.o. female with anemia 2nd decreased cellularity of bone marrow.  Chronic low back pain sx +.  Evaluation for malignancy negative.  Evaluation per Dr. Melchor negative.  GERD +.  Pyelonephritis +.  Fibroids hx.  Recurrant UTI.  Cystoscopy done.  Dr. Strong treated her for depression.  She is to follow-up with PMD for hernia.  She has GI sx, on meds per Dr. Strong..  She saw Dr. Strong, may need hernia repair.  Saw Dr. Spence for migraine eval? Sx better.  Bladder eval negative.  Dr. Méndez.  She denies bladder sx now.    Smoke 1/3 ppd.  Etoh no.  Job, security worker.    Cholecystectomy +, M0.    Mom  SLE.  1 child cerebral palsy.      ROS:   GEN: normal without any fever, night sweats or weight loss  HEENT:  HA's better  CV: normal with no CP, SOB, PND, SIMON or orthopnea  PULM: normal with no SOB, cough, hemoptysis, sputum or pleuritic pain  GI: GERD.  See history of present illness.  : Pyelonephritis hx. SEE HPI.  BREAST: normal with no mass, discharge, pain  SKIN: normal with no rash, erythema.    Review of patient's allergies indicates:  No Known Allergies    Current Outpatient Prescriptions:     cetirizine (ZYRTEC) 10 MG tablet, , Disp: , Rfl:     clotrimazole (MYCELEX) 10 mg lazaro, , Disp: , Rfl:     DULoxetine (CYMBALTA) 60 MG capsule, Take 60 mg by mouth., Disp: , Rfl:     fluticasone (FLONASE) 50 mcg/actuation nasal spray, , Disp: , Rfl:     hydrocodone-acetaminophen (VICODIN) 5-500 mg per tablet, , Disp: , Rfl:     hydrocodone-acetaminophen 7.5-325mg (NORCO) 7.5-325 mg per tablet, Take 1 tablet by mouth., Disp: , Rfl:     hydrOXYzine pamoate (VISTARIL) 25 MG Cap, TAKE 1 CAPSULE BY MOUTH 3 TIMES DAILY AS NEEDED FOR ITCHING,  "Disp: , Rfl:     loratadine (CLARITIN) 10 mg tablet, , Disp: , Rfl:     omeprazole (PRILOSEC) 20 MG capsule, , Disp: , Rfl:     topiramate (TOPAMAX) 100 MG tablet, , Disp: , Rfl:     trazodone (DESYREL) 100 MG tablet, , Disp: , Rfl:           Objective:   Vitals:  Blood pressure 109/76, pulse 78, temperature 98.1 °F (36.7 °C), height 5' 7" (1.702 m), weight 99.8 kg (220 lb).    Physical Examination:   GEN: no apparent distress, comfortable  HEAD: atraumatic and normocephalic  EYES: no pallor, no icterus  ENT: no pharyngeal erythema, external ears WNL; no nasal discharge  NECK: no masses, thyroid normal, trachea midline, no LAD/LN's, supple  CV: RRR with no murmur; normal pulse; normal S1 and S2; no pedal edema  CHEST: Normal respiratory effort; CTAB; normal breath sounds; no wheeze or crackles  ABDOM: nontender and nondistended; soft; normal bowel sounds; no rebound/guarding  MUSC/Skeletal: ROM normal; no crepitus; joints normal; no deformities or arthropathy  EXTREM: no clubbing, cyanosis, inflammation   SKIN: no rashes, lesions, ulcers, petechiae  : no CVAT.  NEURO: grossly intact; motor/sensory WNL; no tremors  PSYCH: normal mood, affect and behavior  LYMPH: normal cervical, supraclavicular, axillary and groin LN's      Labs:     None recently      Radiology/Diagnostic Studies:    Mamm 1/2018 negative.  Bone marrow 2014, 25% cellularity.  IgG 2,000.        Assessment:   (1) 42 y.o. female with chronic anemia secondary to decreased bone marrow cellularity.    (2)Chronic LBP.  Refill meds.  RTC 1 months.    (3)Hx of pyelonephritis, recurrent UTIs.   evaluation negative results.                 "

## 2018-08-16 ENCOUNTER — OFFICE VISIT (OUTPATIENT)
Dept: HEMATOLOGY/ONCOLOGY | Facility: CLINIC | Age: 43
End: 2018-08-16
Payer: MEDICAID

## 2018-08-16 VITALS
HEART RATE: 78 BPM | TEMPERATURE: 99 F | WEIGHT: 222 LBS | BODY MASS INDEX: 34.77 KG/M2 | RESPIRATION RATE: 20 BRPM | DIASTOLIC BLOOD PRESSURE: 80 MMHG | SYSTOLIC BLOOD PRESSURE: 138 MMHG

## 2018-08-16 DIAGNOSIS — G89.29 CHRONIC MIDLINE LOW BACK PAIN WITHOUT SCIATICA: ICD-10-CM

## 2018-08-16 DIAGNOSIS — D63.8 ANEMIA IN OTHER CHRONIC DISEASES CLASSIFIED ELSEWHERE: Primary | ICD-10-CM

## 2018-08-16 DIAGNOSIS — M54.50 CHRONIC MIDLINE LOW BACK PAIN WITHOUT SCIATICA: ICD-10-CM

## 2018-08-16 DIAGNOSIS — R93.7 ABNORMAL BONE XRAY: ICD-10-CM

## 2018-08-16 DIAGNOSIS — N39.0 RECURRENT UTI (URINARY TRACT INFECTION): ICD-10-CM

## 2018-08-16 PROCEDURE — 99213 OFFICE O/P EST LOW 20 MIN: CPT | Mod: ,,, | Performed by: INTERNAL MEDICINE

## 2018-09-13 ENCOUNTER — TELEPHONE (OUTPATIENT)
Dept: HEMATOLOGY/ONCOLOGY | Facility: CLINIC | Age: 43
End: 2018-09-13

## 2018-09-13 NOTE — TELEPHONE ENCOUNTER
----- Message from Michelle Jacob sent at 9/12/2018 11:11 AM CDT -----  Patient called in requesting RX refill on Norco 7.5mg. She would like to pick it up in [employer]. Please contact patient when ready at [phone]. Thanks

## 2018-10-03 ENCOUNTER — OFFICE VISIT (OUTPATIENT)
Dept: HEMATOLOGY/ONCOLOGY | Facility: CLINIC | Age: 43
End: 2018-10-03
Payer: MEDICAID

## 2018-10-03 VITALS
SYSTOLIC BLOOD PRESSURE: 98 MMHG | TEMPERATURE: 99 F | WEIGHT: 235 LBS | BODY MASS INDEX: 33.64 KG/M2 | HEART RATE: 83 BPM | HEIGHT: 70 IN | DIASTOLIC BLOOD PRESSURE: 62 MMHG

## 2018-10-03 DIAGNOSIS — G89.29 CHRONIC MIDLINE LOW BACK PAIN WITHOUT SCIATICA: ICD-10-CM

## 2018-10-03 DIAGNOSIS — R93.7 ABNORMAL BONE XRAY: ICD-10-CM

## 2018-10-03 DIAGNOSIS — D63.8 ANEMIA IN OTHER CHRONIC DISEASES CLASSIFIED ELSEWHERE: Primary | ICD-10-CM

## 2018-10-03 DIAGNOSIS — M54.50 CHRONIC MIDLINE LOW BACK PAIN WITHOUT SCIATICA: ICD-10-CM

## 2018-10-03 DIAGNOSIS — N39.0 RECURRENT UTI (URINARY TRACT INFECTION): ICD-10-CM

## 2018-10-03 PROCEDURE — 99213 OFFICE O/P EST LOW 20 MIN: CPT | Mod: ,,, | Performed by: INTERNAL MEDICINE

## 2018-10-03 NOTE — LETTER
October 3, 2018      Alexandra Guerra MD  [address]  [address]  [place] [address]           [facility]  [address]  [place] [address]  Phone: [phone]  Fax: [phone]          Patient: Robina Potter   MR Number: [id number]   YOB: 1975   Date of Visit: 10/3/2018       Dear Dr. Alexandra Guerra:    Thank you for referring Robina Potter to me for evaluation. Attached you will find relevant portions of my assessment and plan of care.    If you have questions, please do not hesitate to call me. I look forward to following Robina Potter along with you.    Sincerely,    SEAN Mccain MD    Enclosure  CC:  No Recipients    If you would like to receive this communication electronically, please contact [email or web address] or [phone] to request more information on [employer] Link access.    For providers and/or their staff who would like to refer a patient to [facility], please contact us through our one-stop-shop provider referral line, [facility], at [phone].    If you feel you have received this communication in error or would no longer like to receive these types of communications, please e-mail [email or web address]

## 2018-10-04 NOTE — PROGRESS NOTES
[facility] Follow-up    Subjective:      Patient ID:   Robina Potter                                       [address]  43 y.o. female                                           [place] [address]  1975  Dev Strong    Chief Complaint:   Anemia, LBP    HPI:  43 y.o. female with anemia 2nd decreased cellularity of bone marrow.  Chronic low back pain sx +.  Evaluation for malignancy negative.  Evaluation per Dr. Melchor negative.  GERD +.  Pyelonephritis +.  Fibroids hx.  Recurrant UTI.  Cystoscopy done.  Dr. Strong treated her for depression.  She is to follow-up with PMD for hernia.  She has GI sx, on meds per Dr. Strong..  She saw Dr. Strong, may need hernia repair.  Saw Dr. Spence for migraine eval? Sx better.  Bladder eval negative.  Dr. Méndez.  She denies bladder sx now.    Recently seen in ER for URI sx.  Treated with steroids and antibiotics.  Doing better.    Smoke 1/3 ppd.  Etoh no.  Job, security worker.    Cholecystectomy +, M0.    Mom  SLE.  1 child cerebral palsy.      ROS:   GEN: normal without any fever, night sweats or weight loss  HEENT:  HA's better  CV: normal with no CP, SOB, PND, SIMON or orthopnea  PULM: See HPI.  GI: GERD.  See history of present illness.  : Pyelonephritis hx. SEE HPI.  BREAST: normal with no mass, discharge, pain  SKIN: normal with no rash, erythema.    Review of patient's allergies indicates:  No Known Allergies    Current Outpatient Medications:     cetirizine (ZYRTEC) 10 MG tablet, , Disp: , Rfl:     clotrimazole (MYCELEX) 10 mg lazaro, , Disp: , Rfl:     DULoxetine (CYMBALTA) 60 MG capsule, Take 60 mg by mouth., Disp: , Rfl:     fluticasone (FLONASE) 50 mcg/actuation nasal spray, , Disp: , Rfl:     hydrocodone-acetaminophen (VICODIN) 5-500 mg per tablet, , Disp: , Rfl:     hydrocodone-acetaminophen 7.5-325mg (NORCO) 7.5-325 mg per tablet, Take 1 tablet by mouth., Disp: , Rfl:     hydrOXYzine pamoate (VISTARIL) 25 MG Cap, TAKE 1 CAPSULE BY MOUTH 3  "TIMES DAILY AS NEEDED FOR ITCHING, Disp: , Rfl:     loratadine (CLARITIN) 10 mg tablet, , Disp: , Rfl:     omeprazole (PRILOSEC) 20 MG capsule, , Disp: , Rfl:     topiramate (TOPAMAX) 100 MG tablet, , Disp: , Rfl:     trazodone (DESYREL) 100 MG tablet, , Disp: , Rfl:           Objective:   Vitals:  Blood pressure 98/62, pulse 83, temperature 98.5 °F (36.9 °C), height 5' 10" (1.778 m), weight 106.6 kg (235 lb).    Physical Examination:   GEN: no apparent distress, comfortable  HEAD: atraumatic and normocephalic  EYES: no pallor, no icterus  ENT: no pharyngeal erythema, external ears WNL; no nasal discharge  NECK: no masses, thyroid normal, trachea midline, no LAD/LN's, supple  CV: RRR with no murmur; normal pulse; normal S1 and S2; no pedal edema  CHEST: Normal respiratory effort; CTAB;  no wheeze or crackles  ABDOM: nontender and nondistended; soft; normal bowel sounds; no rebound/guarding, L/S NP  MUSC/Skeletal: ROM normal; no crepitus; joints normal; no deformities  EXTREM: no clubbing, cyanosis, inflammation   SKIN: no rashes, lesions, ulcers, petechiae  : no CVAT.  NEURO: grossly intact; motor/sensory WNL; no tremors  PSYCH: normal mood, affect and behavior  LYMPH: normal cervical, supraclavicular, axillary and groin LN's      Labs:     None recently      Radiology/Diagnostic Studies:    Mamm 1/2018 negative.  Bone marrow 2014, 25% cellularity.  IgG 2,000.        Assessment:   (1) 43 y.o. female with chronic anemia secondary to decreased bone marrow cellularity.    (2)Chronic LBP.  Refill meds.  RTC 1 months.    (3)Hx of pyelonephritis, recurrent UTIs.   evaluation negative results.  But with frequency sx, ?UTI, trial of Cipro BID.    Recent URI sx, treated.                 "

## 2018-11-01 ENCOUNTER — OFFICE VISIT (OUTPATIENT)
Dept: HEMATOLOGY/ONCOLOGY | Facility: CLINIC | Age: 43
End: 2018-11-01
Payer: MEDICAID

## 2018-11-01 VITALS
HEIGHT: 67 IN | OXYGEN SATURATION: 100 % | HEART RATE: 79 BPM | WEIGHT: 236 LBS | SYSTOLIC BLOOD PRESSURE: 113 MMHG | DIASTOLIC BLOOD PRESSURE: 55 MMHG | TEMPERATURE: 98 F | BODY MASS INDEX: 37.04 KG/M2

## 2018-11-01 DIAGNOSIS — N39.0 RECURRENT UTI (URINARY TRACT INFECTION): ICD-10-CM

## 2018-11-01 DIAGNOSIS — D63.8 ANEMIA IN OTHER CHRONIC DISEASES CLASSIFIED ELSEWHERE: Primary | ICD-10-CM

## 2018-11-01 DIAGNOSIS — R93.7 ABNORMAL BONE XRAY: ICD-10-CM

## 2018-11-01 PROCEDURE — 99213 OFFICE O/P EST LOW 20 MIN: CPT | Mod: ,,, | Performed by: INTERNAL MEDICINE

## 2018-11-02 NOTE — PROGRESS NOTES
[facility] Follow-up    Subjective:      Patient ID:   Robina Potter                                       [address]  43 y.o. female                                           [place] [address]  1975  Dev Strong    Chief Complaint:   Anemia, LBP    HPI:  43 y.o. female with anemia 2nd decreased cellularity of bone marrow.  Chronic low back pain sx +.  Evaluation for malignancy negative.  Evaluation per Dr. Melchor negative.  GERD +.  Pyelonephritis +.  Fibroids hx.  Recurrant UTI.  Cystoscopy done.  Dr. Strong treated her for depression.  She is to follow-up with PMD for hernia.  She has GI sx, on meds per Dr. Strong..  She saw Dr. Strong, may need hernia repair.  Saw Dr. Spence for migraine eval? Sx better.  Bladder eval negative.  Dr. Méndez.  She denies bladder sx now.    Recently seen in ER for URI sx.  Treated with steroids and antibiotics.  Doing better.    Smoke 1/3 ppd.  Etoh no.  Job, security worker.    Cholecystectomy +, M0.    Mom  SLE.  1 child cerebral palsy.      ROS:   GEN: normal without any fever, night sweats or weight loss  HEENT:  HA's better  CV: normal with no CP, SOB, PND, SIMON or orthopnea  PULM: See HPI.  GI: GERD.  See history of present illness.  : Pyelonephritis hx. SEE HPI.  BREAST: normal with no mass, discharge, pain  SKIN: normal with no rash, erythema.    Review of patient's allergies indicates:  No Known Allergies    Current Outpatient Medications:     cetirizine (ZYRTEC) 10 MG tablet, , Disp: , Rfl:     clotrimazole (MYCELEX) 10 mg lazaro, , Disp: , Rfl:     DULoxetine (CYMBALTA) 60 MG capsule, Take 60 mg by mouth., Disp: , Rfl:     fluticasone (FLONASE) 50 mcg/actuation nasal spray, , Disp: , Rfl:     hydrocodone-acetaminophen (VICODIN) 5-500 mg per tablet, , Disp: , Rfl:     hydrocodone-acetaminophen 7.5-325mg (NORCO) 7.5-325 mg per tablet, Take 1 tablet by mouth., Disp: , Rfl:     hydrOXYzine pamoate (VISTARIL) 25 MG Cap, TAKE 1 CAPSULE BY MOUTH 3  "TIMES DAILY AS NEEDED FOR ITCHING, Disp: , Rfl:     loratadine (CLARITIN) 10 mg tablet, , Disp: , Rfl:     omeprazole (PRILOSEC) 20 MG capsule, , Disp: , Rfl:     topiramate (TOPAMAX) 100 MG tablet, , Disp: , Rfl:     trazodone (DESYREL) 100 MG tablet, , Disp: , Rfl:           Objective:   Vitals:  Blood pressure (!) 113/55, pulse 79, temperature 97.9 °F (36.6 °C), height 5' 7" (1.702 m), weight 107 kg (236 lb), SpO2 100 %.    Physical Examination:   GEN: no apparent distress, comfortable  HEAD: atraumatic and normocephalic  EYES: no pallor, no icterus  ENT: no pharyngeal erythema, external ears WNL; no nasal discharge  NECK: no masses, thyroid normal, trachea midline, no LAD/LN's, supple  CV: RRR with no murmur; normal pulse; normal S1 and S2; no pedal edema  CHEST: Normal respiratory effort; CTAB;  no wheeze or crackles  ABDOM: nontender and nondistended; soft;  no rebound/guarding, L/S NP  MUSC/Skeletal: ROM normal; no crepitus; joints normal; no deformities  EXTREM: no clubbing, cyanosis, inflammation   SKIN: no rashes, lesions, ulcers, petechiae  : no CVAT.  NEURO: grossly intact; motor/sensory WNL; no tremors  PSYCH: normal mood, affect and behavior  LYMPH: normal cervical, supraclavicular, axillary and groin LN's      Labs:     None recently      Radiology/Diagnostic Studies:    Mamm 1/2018 negative.  Bone marrow 2014, 25% cellularity.  IgG 2,000.        Assessment:   (1) 43 y.o. female with chronic anemia secondary to decreased bone marrow cellularity.    (2)Chronic LBP.  Refill meds.  RTC 1 months.    (3)Hx of pyelonephritis, recurrent UTIs.   evaluation negative results.  But with frequency sx, ?UTI, trial of Cipro BID.    Recent URI sx, treated.                 "

## 2018-12-06 ENCOUNTER — OFFICE VISIT (OUTPATIENT)
Dept: HEMATOLOGY/ONCOLOGY | Facility: CLINIC | Age: 43
End: 2018-12-06
Payer: MEDICAID

## 2018-12-06 VITALS
DIASTOLIC BLOOD PRESSURE: 70 MMHG | TEMPERATURE: 98 F | BODY MASS INDEX: 37.59 KG/M2 | RESPIRATION RATE: 20 BRPM | SYSTOLIC BLOOD PRESSURE: 120 MMHG | WEIGHT: 240 LBS | HEART RATE: 70 BPM

## 2018-12-06 DIAGNOSIS — M54.50 CHRONIC MIDLINE LOW BACK PAIN WITHOUT SCIATICA: ICD-10-CM

## 2018-12-06 DIAGNOSIS — G89.29 CHRONIC MIDLINE LOW BACK PAIN WITHOUT SCIATICA: ICD-10-CM

## 2018-12-06 DIAGNOSIS — D63.8 ANEMIA, CHRONIC DISEASE: ICD-10-CM

## 2018-12-06 DIAGNOSIS — R93.7 ABNORMAL BONE XRAY: Primary | ICD-10-CM

## 2018-12-06 PROCEDURE — 99213 OFFICE O/P EST LOW 20 MIN: CPT | Mod: ,,, | Performed by: INTERNAL MEDICINE

## 2018-12-06 NOTE — PATIENT INSTRUCTIONS

## 2018-12-09 NOTE — PROGRESS NOTES
[facility] Follow-up    Subjective:      Patient ID:   Robina Potter                                       [address]  43 y.o. female                                           [place] [address]  1975  Dev Strong    Chief Complaint:   Anemia, LBP    HPI:  43 y.o. female with anemia 2nd decreased cellularity of bone marrow.  Chronic low back pain sx +.  Evaluation for malignancy negative.  Evaluation per Dr. Melchor negative.  GERD +.  Pyelonephritis +.  Fibroids hx.  Recurrant UTI.  Cystoscopy done.  Dr. Strong treated her for depression.  She is to follow-up with PMD for hernia.  She has GI sx, on meds per Dr. Strong..  She saw Dr. Strong, may need hernia repair.  Saw Dr. Spence for migraine eval? Sx better.  Bladder eval negative.  Dr. Méndez.  She denies bladder sx now.    Recently seen in ER for URI sx.  Treated with steroids and antibiotics.  Doing better.    Smoke 1/3 ppd.  Etoh no.  Job, security worker.    Cholecystectomy +, M0.    Mom  SLE.  1 child cerebral palsy.      ROS:   GEN: normal without any fever, night sweats or weight loss  HEENT:  HA's better  CV: normal with no CP, SOB, PND, SIMON or orthopnea  PULM: See HPI.  GI: GERD.  See history of present illness.  : Pyelonephritis hx. SEE HPI.  BREAST: normal with no mass, discharge, pain  SKIN: normal with no rash, erythema.    Review of patient's allergies indicates:  No Known Allergies    Current Outpatient Medications:     DULoxetine (CYMBALTA) 60 MG capsule, Take 60 mg by mouth., Disp: , Rfl:     fluticasone (FLONASE) 50 mcg/actuation nasal spray, , Disp: , Rfl:     hydrocodone-acetaminophen 7.5-325mg (NORCO) 7.5-325 mg per tablet, Take 1 tablet by mouth., Disp: , Rfl:     omeprazole (PRILOSEC) 20 MG capsule, , Disp: , Rfl:     topiramate (TOPAMAX) 100 MG tablet, , Disp: , Rfl:     trazodone (DESYREL) 100 MG tablet, , Disp: , Rfl:     cetirizine (ZYRTEC) 10 MG tablet, , Disp: , Rfl:     clotrimazole (MYCELEX) 10 mg lazaro,  , Disp: , Rfl:     hydrocodone-acetaminophen (VICODIN) 5-500 mg per tablet, , Disp: , Rfl:     hydrOXYzine pamoate (VISTARIL) 25 MG Cap, TAKE 1 CAPSULE BY MOUTH 3 TIMES DAILY AS NEEDED FOR ITCHING, Disp: , Rfl:     loratadine (CLARITIN) 10 mg tablet, , Disp: , Rfl:           Objective:   Vitals:  Blood pressure 120/70, pulse 70, temperature 97.5 °F (36.4 °C), resp. rate 20, weight 108.9 kg (240 lb).    Physical Examination:   GEN: no apparent distress, comfortable  HEAD: atraumatic and normocephalic  EYES: no pallor, no icterus  ENT: no pharyngeal erythema, external ears WNL; no nasal discharge  NECK: no masses, thyroid normal, trachea midline, no LAD/LN's, supple  CV: RRR with no murmur; normal pulse; normal S1 and S2; no pedal edema  CHEST: Normal respiratory effort; CTAB;  no wheeze or crackles  ABDOM: nontender and nondistended; soft;  no rebound/guarding, L/S NP  MUSC/Skeletal: ROM normal; no crepitus; joints normal; no deformities  EXTREM: no clubbing, cyanosis, inflammation   SKIN: no rashes, lesions, ulcers, petechiae  : no CVAT.  NEURO: grossly intact; motor/sensory WNL; no tremors  PSYCH: normal mood, affect and behavior  LYMPH: normal cervical, supraclavicular, axillary and groin LN's      Labs:     None recently      Radiology/Diagnostic Studies:    Mamm 1/2018 negative.  Bone marrow 2014, 25% cellularity.  IgG 2,000.        Assessment:   (1) 43 y.o. female with chronic anemia secondary to decreased bone marrow cellularity.    (2)Chronic LBP.  Refill meds.  RTC 1 months.    (3)Hx of pyelonephritis, recurrent UTIs.   evaluation negative results.    Recent URI sx, treated.

## 2019-01-03 ENCOUNTER — OFFICE VISIT (OUTPATIENT)
Dept: HEMATOLOGY/ONCOLOGY | Facility: CLINIC | Age: 44
End: 2019-01-03
Payer: MEDICAID

## 2019-01-03 VITALS
SYSTOLIC BLOOD PRESSURE: 121 MMHG | OXYGEN SATURATION: 99 % | BODY MASS INDEX: 37.12 KG/M2 | DIASTOLIC BLOOD PRESSURE: 69 MMHG | TEMPERATURE: 97 F | HEART RATE: 65 BPM | WEIGHT: 237 LBS

## 2019-01-03 DIAGNOSIS — M54.50 CHRONIC MIDLINE LOW BACK PAIN WITHOUT SCIATICA: ICD-10-CM

## 2019-01-03 DIAGNOSIS — R93.7 ABNORMAL BONE XRAY: ICD-10-CM

## 2019-01-03 DIAGNOSIS — G89.29 CHRONIC MIDLINE LOW BACK PAIN WITHOUT SCIATICA: ICD-10-CM

## 2019-01-03 DIAGNOSIS — D63.8 ANEMIA, CHRONIC DISEASE: Primary | ICD-10-CM

## 2019-01-03 PROCEDURE — 99213 OFFICE O/P EST LOW 20 MIN: CPT | Mod: ,,, | Performed by: INTERNAL MEDICINE

## 2019-01-03 PROCEDURE — 99213 PR OFFICE/OUTPT VISIT, EST, LEVL III, 20-29 MIN: ICD-10-PCS | Mod: ,,, | Performed by: INTERNAL MEDICINE

## 2019-01-03 NOTE — LETTER
January 6, 2019      Alexandra Guerra MD  [address]  [address]  [place] [address]           [facility]  [address]  [place] [address]  Phone: [phone]  Fax: [phone]          Patient: Robina Potter   MR Number: [id number]   YOB: 1975   Date of Visit: 1/3/2019       Dear Dr. Alexandra Guerra:    Thank you for referring Robina Potter to me for evaluation. Attached you will find relevant portions of my assessment and plan of care.    If you have questions, please do not hesitate to call me. I look forward to following Robina Potter along with you.    Sincerely,    SEAN Mccain MD    Enclosure  CC:  No Recipients    If you would like to receive this communication electronically, please contact [email or web address] or [phone] to request more information on [employer] Link access.    For providers and/or their staff who would like to refer a patient to [facility], please contact us through our one-stop-shop provider referral line, [facility], at [phone].    If you feel you have received this communication in error or would no longer like to receive these types of communications, please e-mail [email or web address]

## 2019-01-06 NOTE — PROGRESS NOTES
[facility] Follow-up    Subjective:      Patient ID:   Robina Potter                                       [address]  43 y.o. female                                           [place] [address]  1975  Dev Strong    Chief Complaint:   Anemia, LBP    HPI:  43 y.o. female with anemia 2nd decreased cellularity of bone marrow.  Chronic low back pain sx +.  Evaluation for malignancy negative.  Evaluation per Dr. Melchor negative.  GERD +.  Pyelonephritis +.  Fibroids hx.  Recurrant UTI.  Cystoscopy done.  Dr. Strong treated her for depression.  She is to follow-up with PMD for hernia.  She has GI sx, on meds per Dr. Strong..  She saw Dr. Strong, may need hernia repair.  Saw Dr. Spence for migraine eval? Sx better.  Bladder eval negative.  Dr. Méndez.  She denies bladder sx now.    Recently URI sx. Treated with steroids and antibiotics.  Doing better.    Smoke 1/3 ppd.  Etoh no.  Job, security worker.    Cholecystectomy +, M0.    Mom  SLE.  1 child cerebral palsy.      ROS:   GEN: normal without any fever, night sweats or weight loss  HEENT:  HA's better  CV: normal with no CP, SOB, PND, SIMON or orthopnea  PULM: See HPI.  GI: GERD.  See history of present illness.  : Pyelonephritis hx. SEE HPI.  BREAST: normal with no mass, discharge, pain  SKIN: normal with no rash, erythema.    Review of patient's allergies indicates:  No Known Allergies    Current Outpatient Medications:     clotrimazole (MYCELEX) 10 mg lazaro, , Disp: , Rfl:     fluticasone (FLONASE) 50 mcg/actuation nasal spray, , Disp: , Rfl:     hydrocodone-acetaminophen 7.5-325mg (NORCO) 7.5-325 mg per tablet, Take 1 tablet by mouth., Disp: , Rfl:     hydrOXYzine pamoate (VISTARIL) 25 MG Cap, TAKE 1 CAPSULE BY MOUTH 3 TIMES DAILY AS NEEDED FOR ITCHING, Disp: , Rfl:     omeprazole (PRILOSEC) 20 MG capsule, , Disp: , Rfl:     trazodone (DESYREL) 100 MG tablet, , Disp: , Rfl:     cetirizine (ZYRTEC) 10 MG tablet, , Disp: , Rfl:      DULoxetine (CYMBALTA) 60 MG capsule, Take 60 mg by mouth., Disp: , Rfl:     hydrocodone-acetaminophen (VICODIN) 5-500 mg per tablet, , Disp: , Rfl:     loratadine (CLARITIN) 10 mg tablet, , Disp: , Rfl:     topiramate (TOPAMAX) 100 MG tablet, , Disp: , Rfl:           Objective:   Vitals:  Blood pressure 121/69, pulse 65, temperature 97.3 °F (36.3 °C), weight 107.5 kg (237 lb), SpO2 99 %.    Physical Examination:   GEN: no apparent distress, comfortable  HEAD: atraumatic and normocephalic  EYES: no pallor, no icterus  ENT: no pharyngeal erythema, external ears WNL; no nasal discharge  NECK: no masses, thyroid normal, trachea midline, no LAD/LN's, supple  CV: RRR with no murmur; normal pulse; normal S1 and S2; no pedal edema  CHEST: Normal respiratory effort; CTAB;  no wheeze or crackles  ABDOM: nontender and nondistended; soft;  no rebound/guarding, L/S NP  MUSC/Skeletal: ROM normal; no crepitus; joints normal; no deformities  EXTREM: no clubbing, cyanosis, inflammation   SKIN: no rashes, lesions, ulcers, petechiae  : no CVAT.  NEURO: grossly intact; motor/sensory WNL; no tremors  PSYCH: normal mood, affect and behavior  LYMPH: normal cervical, supraclavicular, axillary and groin LN's      Labs:     None recently      Radiology/Diagnostic Studies:    Mamm 1/2018 negative.  Bone marrow 2014, 25% cellularity.  IgG 2,000.        Assessment:   (1) 43 y.o. female with chronic anemia secondary to decreased bone marrow cellularity.    (2)Chronic LBP.  Refill meds.  RTC 1 months.    (3)Hx of pyelonephritis, recurrent UTIs.   evaluation negative results.    Recent URI sx, treated.

## 2019-01-09 ENCOUNTER — TELEPHONE (OUTPATIENT)
Dept: HEMATOLOGY/ONCOLOGY | Facility: CLINIC | Age: 44
End: 2019-01-09

## 2019-01-09 NOTE — TELEPHONE ENCOUNTER
Lm on pt vm explaining that she will need a new approval. She had one more visit left but the approval expires on 2/3/2019 and her apt is on 2/7/2019. Pt must have new approval before she can be seen.

## 2019-02-07 ENCOUNTER — OFFICE VISIT (OUTPATIENT)
Dept: HEMATOLOGY/ONCOLOGY | Facility: CLINIC | Age: 44
End: 2019-02-07
Payer: MEDICAID

## 2019-02-07 VITALS
HEART RATE: 74 BPM | SYSTOLIC BLOOD PRESSURE: 108 MMHG | BODY MASS INDEX: 36.18 KG/M2 | WEIGHT: 231 LBS | DIASTOLIC BLOOD PRESSURE: 70 MMHG | OXYGEN SATURATION: 99 % | TEMPERATURE: 98 F

## 2019-02-07 DIAGNOSIS — R93.7 ABNORMAL BONE XRAY: ICD-10-CM

## 2019-02-07 DIAGNOSIS — D63.8 ANEMIA IN OTHER CHRONIC DISEASES CLASSIFIED ELSEWHERE: ICD-10-CM

## 2019-02-07 DIAGNOSIS — G89.29 CHRONIC MIDLINE LOW BACK PAIN WITHOUT SCIATICA: Primary | ICD-10-CM

## 2019-02-07 DIAGNOSIS — M54.50 CHRONIC MIDLINE LOW BACK PAIN WITHOUT SCIATICA: Primary | ICD-10-CM

## 2019-02-07 PROCEDURE — 99213 PR OFFICE/OUTPT VISIT, EST, LEVL III, 20-29 MIN: ICD-10-PCS | Mod: ,,, | Performed by: INTERNAL MEDICINE

## 2019-02-07 PROCEDURE — 99213 OFFICE O/P EST LOW 20 MIN: CPT | Mod: ,,, | Performed by: INTERNAL MEDICINE

## 2019-03-07 ENCOUNTER — OFFICE VISIT (OUTPATIENT)
Dept: HEMATOLOGY/ONCOLOGY | Facility: CLINIC | Age: 44
End: 2019-03-07
Payer: MEDICAID

## 2019-03-07 VITALS
WEIGHT: 232 LBS | DIASTOLIC BLOOD PRESSURE: 60 MMHG | TEMPERATURE: 97 F | SYSTOLIC BLOOD PRESSURE: 98 MMHG | HEART RATE: 79 BPM | BODY MASS INDEX: 36.34 KG/M2 | OXYGEN SATURATION: 100 %

## 2019-03-07 DIAGNOSIS — R93.7 ABNORMAL BONE XRAY: ICD-10-CM

## 2019-03-07 DIAGNOSIS — D63.8 ANEMIA IN OTHER CHRONIC DISEASES CLASSIFIED ELSEWHERE: Primary | ICD-10-CM

## 2019-03-07 DIAGNOSIS — G89.29 CHRONIC MIDLINE LOW BACK PAIN WITHOUT SCIATICA: ICD-10-CM

## 2019-03-07 DIAGNOSIS — M54.50 CHRONIC MIDLINE LOW BACK PAIN WITHOUT SCIATICA: ICD-10-CM

## 2019-03-07 PROCEDURE — 99213 OFFICE O/P EST LOW 20 MIN: CPT | Mod: ,,, | Performed by: INTERNAL MEDICINE

## 2019-03-07 PROCEDURE — 99213 PR OFFICE/OUTPT VISIT, EST, LEVL III, 20-29 MIN: ICD-10-PCS | Mod: ,,, | Performed by: INTERNAL MEDICINE

## 2019-03-08 NOTE — PROGRESS NOTES
[facility] Follow-up    Subjective:      Patient ID:   Robina Potter                                       [address]  43 y.o. female                                           [place] [address]  1975  Dev Strong    Chief Complaint:   Anemia, LBP    HPI:  43 y.o. female with anemia 2nd decreased cellularity of bone marrow.  Chronic low back pain sx +.  Evaluation for malignancy negative.  Evaluation per Dr. Melchor negative.  GERD +.  Pyelonephritis +.  Fibroids hx.  Recurrant UTI.  Cystoscopy done.  Dr. Strong treated her for depression.  She is to follow-up with PMD for hernia.  She has GI sx, on meds per Dr. Strong..  She saw Dr. Strong, may need hernia repair.  Saw Dr. Spence for migraine eval? Sx better.  Bladder eval negative.  Dr. Méndez.  She denies bladder sx now.    She has increased bone and joint pain sx due to recent cold and damp weather.  Refill meds for pain control.    Recently URI sx. Treated with steroids and antibiotics.  Doing better.    Smoke 1/3 ppd.  Etoh no.  Job, security worker.    Cholecystectomy +, M0.    Mom  SLE.  1 child cerebral palsy.      ROS:   GEN: normal without any fever, night sweats or weight loss  HEENT:  HA's better  CV: normal with no CP, SOB, PND, SIMON or orthopnea  PULM: See HPI.  GI: GERD.  See history of present illness.  : Pyelonephritis hx. SEE HPI.  BREAST: normal with no mass, discharge, pain  SKIN: normal with no rash, erythema.    Review of patient's allergies indicates:  No Known Allergies    Current Outpatient Medications:     cetirizine (ZYRTEC) 10 MG tablet, , Disp: , Rfl:     clotrimazole (MYCELEX) 10 mg lazaro, , Disp: , Rfl:     DULoxetine (CYMBALTA) 60 MG capsule, Take 60 mg by mouth., Disp: , Rfl:     fluticasone (FLONASE) 50 mcg/actuation nasal spray, , Disp: , Rfl:     hydrocodone-acetaminophen (VICODIN) 5-500 mg per tablet, , Disp: , Rfl:     hydrocodone-acetaminophen 7.5-325mg (NORCO) 7.5-325 mg per tablet, Take 1 tablet by  mouth., Disp: , Rfl:     hydrOXYzine pamoate (VISTARIL) 25 MG Cap, TAKE 1 CAPSULE BY MOUTH 3 TIMES DAILY AS NEEDED FOR ITCHING, Disp: , Rfl:     loratadine (CLARITIN) 10 mg tablet, , Disp: , Rfl:     omeprazole (PRILOSEC) 20 MG capsule, , Disp: , Rfl:     topiramate (TOPAMAX) 100 MG tablet, , Disp: , Rfl:     trazodone (DESYREL) 100 MG tablet, , Disp: , Rfl:           Objective:   Vitals:  Blood pressure 98/60, pulse 79, temperature 96.9 °F (36.1 °C), weight 105.2 kg (232 lb), SpO2 100 %.    Physical Examination:   GEN: no apparent distress, comfortable  HEAD: atraumatic and normocephalic  EYES: no pallor, no icterus  ENT: no pharyngeal erythema, external ears WNL; no nasal discharge  NECK: no masses, thyroid normal, trachea midline, no LAD/LN's, supple  CV: RRR with no murmur; normal pulse; normal S1 and S2; no pedal edema  CHEST: Normal respiratory effort; CTAB;  no wheeze or crackles  ABDOM: nontender and nondistended; soft;  no rebound/guarding, L/S NP  MUSC/Skeletal: ROM normal; no crepitus; joints normal; no deformities  EXTREM: no clubbing, cyanosis, inflammation   SKIN: no rashes, lesions, ulcers, petechiae  : no CVAT.  NEURO: grossly intact; motor/sensory WNL; no tremors  PSYCH: normal mood, affect and behavior  LYMPH: normal cervical, supraclavicular, axillary and groin LN's      Labs:     None recently      Radiology/Diagnostic Studies:    Mamm 1/2018 negative.  Bone marrow 2014, 25% cellularity.  IgG 2,000.        Assessment:   (1) 43 y.o. female with chronic anemia secondary to decreased bone marrow cellularity.    (2)Chronic LBP.  Refill meds.  RTC 1 months.    (3)Hx of pyelonephritis, recurrent UTIs.   evaluation negative results.    Recent URI sx, treated.    Check CBC, Ferritin, B12, CMP and RTC 1 month.

## 2019-04-25 ENCOUNTER — OFFICE VISIT (OUTPATIENT)
Dept: HEMATOLOGY/ONCOLOGY | Facility: CLINIC | Age: 44
End: 2019-04-25
Payer: MEDICAID

## 2019-04-25 VITALS
HEART RATE: 89 BPM | BODY MASS INDEX: 36.65 KG/M2 | SYSTOLIC BLOOD PRESSURE: 128 MMHG | WEIGHT: 234 LBS | DIASTOLIC BLOOD PRESSURE: 88 MMHG | TEMPERATURE: 99 F | RESPIRATION RATE: 20 BRPM

## 2019-04-25 DIAGNOSIS — N39.0 RECURRENT UTI (URINARY TRACT INFECTION): ICD-10-CM

## 2019-04-25 DIAGNOSIS — G89.29 CHRONIC MIDLINE LOW BACK PAIN WITHOUT SCIATICA: ICD-10-CM

## 2019-04-25 DIAGNOSIS — D63.8 ANEMIA IN OTHER CHRONIC DISEASES CLASSIFIED ELSEWHERE: Primary | ICD-10-CM

## 2019-04-25 DIAGNOSIS — R93.7 ABNORMAL BONE XRAY: ICD-10-CM

## 2019-04-25 DIAGNOSIS — M54.50 CHRONIC MIDLINE LOW BACK PAIN WITHOUT SCIATICA: ICD-10-CM

## 2019-04-25 PROCEDURE — 99213 PR OFFICE/OUTPT VISIT, EST, LEVL III, 20-29 MIN: ICD-10-PCS | Mod: ,,, | Performed by: INTERNAL MEDICINE

## 2019-04-25 PROCEDURE — 99213 OFFICE O/P EST LOW 20 MIN: CPT | Mod: ,,, | Performed by: INTERNAL MEDICINE

## 2019-04-28 NOTE — PROGRESS NOTES
[facility] Follow-up    Subjective:      Patient ID:   Robina Potter                                       [address]  43 y.o. female                                           [place] [address]  1975  Dev Strong    Chief Complaint:   Anemia, LBP    HPI:  43 y.o. female with anemia 2nd decreased cellularity of bone marrow.  Chronic low back pain sx +.  Applies heat with relief.  Evaluation for malignancy negative.  Evaluation per Dr. Melchor negative.  GERD +.  Pyelonephritis +.  Fibroids hx.  Recurrant UTI.  Cystoscopy done.  Dr. Strong treated her for depression.  She is to follow-up with PMD for hernia.  She has GI sx, on meds per Dr. Strong..  She saw Dr. Strong, may need hernia repair.  Saw Dr. Spence for migraine eval? Sx better.  Bladder eval negative.  Dr. Méndez.  She denies bladder sx now.    She has increased bone and joint pain sx due to recent cold and damp weather.  Refill meds for pain control.    Recently URI sx. Treated with steroids and antibiotics.  Doing better.    Smoke 1/3 ppd.  Etoh no.  Job, security worker.    Cholecystectomy +, M0.    Mom  SLE.  1 child cerebral palsy.      ROS:   GEN: normal without any fever, night sweats or weight loss  HEENT:  HA's better  CV: normal with no CP, SOB, PND, SIMON or orthopnea  PULM: See HPI.  GI: GERD.  See history of present illness.  : Pyelonephritis hx. SEE HPI.  BREAST: normal with no mass, discharge, pain  SKIN: normal with no rash, erythema.    Review of patient's allergies indicates:  No Known Allergies    Current Outpatient Medications:     cetirizine (ZYRTEC) 10 MG tablet, , Disp: , Rfl:     clotrimazole (MYCELEX) 10 mg lazaro, , Disp: , Rfl:     DULoxetine (CYMBALTA) 60 MG capsule, Take 60 mg by mouth., Disp: , Rfl:     fluticasone (FLONASE) 50 mcg/actuation nasal spray, , Disp: , Rfl:     hydrocodone-acetaminophen (VICODIN) 5-500 mg per tablet, , Disp: , Rfl:     hydrocodone-acetaminophen 7.5-325mg (NORCO) 7.5-325 mg  per tablet, Take 1 tablet by mouth., Disp: , Rfl:     hydrOXYzine pamoate (VISTARIL) 25 MG Cap, TAKE 1 CAPSULE BY MOUTH 3 TIMES DAILY AS NEEDED FOR ITCHING, Disp: , Rfl:     loratadine (CLARITIN) 10 mg tablet, , Disp: , Rfl:     omeprazole (PRILOSEC) 20 MG capsule, , Disp: , Rfl:     topiramate (TOPAMAX) 100 MG tablet, , Disp: , Rfl:     trazodone (DESYREL) 100 MG tablet, , Disp: , Rfl:           Objective:   Vitals:  Blood pressure 128/88, pulse 89, temperature 98.5 °F (36.9 °C), resp. rate 20, weight 106.1 kg (234 lb).    Physical Examination:   GEN: no apparent distress, comfortable  HEAD: atraumatic and normocephalic  EYES: no pallor, no icterus  ENT: no pharyngeal erythema, external ears WNL; no nasal discharge  NECK: no masses, thyroid normal, trachea midline, no LAD/LN's, supple  CV: RRR with no murmur; normal pulse; normal S1 and S2; no pedal edema  CHEST: Normal respiratory effort; CTAB;  no wheeze or crackles  ABDOM: nontender and nondistended; soft;  no rebound/guarding, L/S NP  MUSC/Skeletal: ROM normal; no crepitus; joints normal; no deformities  EXTREM: no clubbing, cyanosis, inflammation   SKIN: no rashes, lesions, ulcers, petechiae  : no CVAT.  NEURO: grossly intact; motor/sensory WNL; no tremors  PSYCH: normal mood, affect and behavior  LYMPH: normal cervical, supraclavicular, axillary and groin LN's      Labs:     None recently      Radiology/Diagnostic Studies:    Mamm 1/2018 negative.  Bone marrow 2014, 25% cellularity.  IgG 2,000.        Assessment:   (1) 43 y.o. female with chronic anemia secondary to decreased bone marrow cellularity.    (2)Chronic LBP.  Refill meds.  RTC 1 months.    (3)Hx of pyelonephritis, recurrent UTIs.   evaluation negative results.    Recent URI sx, treated.    RTC 1 month

## 2019-05-15 NOTE — PROGRESS NOTES
[facility] Follow-up    Subjective:      Patient ID:   Robina Potter                                       [address]  43 y.o. female                                           [place] [address]  1975  Dev Strong    Chief Complaint:   Anemia, LBP    HPI:  43 y.o. female with anemia 2nd decreased cellularity of bone marrow.  Chronic low back pain sx +.  Evaluation for malignancy negative.  Evaluation per Dr. Melchor negative.  GERD +.  Pyelonephritis +.  Fibroids hx.  Recurrant UTI.  Cystoscopy done.  Dr. Strong treated her for depression.  She is to follow-up with PMD for hernia.  She has GI sx, on meds per Dr. Strong..  She saw Dr. Strong, may need hernia repair.  Saw Dr. Spence for migraine eval? Sx better.  Bladder eval negative.  Dr. Méndez.  She denies bladder sx now.    Recently URI sx. Treated with steroids and antibiotics.  Doing better.    Smoke 1/3 ppd.  Etoh no.  Job, security worker.    Cholecystectomy +, M0.    Mom  SLE.  1 child cerebral palsy.      ROS:   GEN: normal without any fever, night sweats or weight loss  HEENT:  HA's better  CV: normal with no CP, SOB, PND, SIMON or orthopnea  PULM: See HPI.  GI: GERD.  See history of present illness.  : Pyelonephritis hx. SEE HPI.  BREAST: normal with no mass, discharge, pain  SKIN: normal with no rash, erythema.    Review of patient's allergies indicates:  No Known Allergies    Current Outpatient Medications:     cetirizine (ZYRTEC) 10 MG tablet, , Disp: , Rfl:     clotrimazole (MYCELEX) 10 mg lazaro, , Disp: , Rfl:     DULoxetine (CYMBALTA) 60 MG capsule, Take 60 mg by mouth., Disp: , Rfl:     fluticasone (FLONASE) 50 mcg/actuation nasal spray, , Disp: , Rfl:     hydrocodone-acetaminophen (VICODIN) 5-500 mg per tablet, , Disp: , Rfl:     hydrocodone-acetaminophen 7.5-325mg (NORCO) 7.5-325 mg per tablet, Take 1 tablet by mouth., Disp: , Rfl:     hydrOXYzine pamoate (VISTARIL) 25 MG Cap, TAKE 1 CAPSULE BY MOUTH 3 TIMES DAILY AS  NEEDED FOR ITCHING, Disp: , Rfl:     loratadine (CLARITIN) 10 mg tablet, , Disp: , Rfl:     omeprazole (PRILOSEC) 20 MG capsule, , Disp: , Rfl:     topiramate (TOPAMAX) 100 MG tablet, , Disp: , Rfl:     trazodone (DESYREL) 100 MG tablet, , Disp: , Rfl:           Objective:   Vitals:  Blood pressure 108/70, pulse 74, temperature 97.6 °F (36.4 °C), weight 104.8 kg (231 lb), SpO2 99 %.    Physical Examination:   GEN: no apparent distress, comfortable  HEAD: atraumatic and normocephalic  EYES: no pallor, no icterus  ENT: no pharyngeal erythema, external ears WNL; no nasal discharge  NECK: no masses, thyroid normal, trachea midline, no LAD/LN's, supple  CV: RRR with no murmur; normal pulse; normal S1 and S2; no pedal edema  CHEST: Normal respiratory effort; CTAB;  no wheeze or crackles  ABDOM: nontender and nondistended; soft;  no rebound/guarding, L/S NP  MUSC/Skeletal: ROM normal; no crepitus; joints normal; no deformities  EXTREM: no clubbing, cyanosis, inflammation   SKIN: no rashes, lesions, ulcers, petechiae  : no CVAT.  NEURO: grossly intact; motor/sensory WNL; no tremors  PSYCH: normal mood, affect and behavior  LYMPH: normal cervical, supraclavicular, axillary and groin LN's      Labs:     None recently      Radiology/Diagnostic Studies:    Mamm 1/2018 negative.  Bone marrow 2014, 25% cellularity.  IgG 2,000.        Assessment:   (1) 43 y.o. female with chronic anemia secondary to decreased bone marrow cellularity.    (2)Chronic LBP.  Refill meds.  RTC 1 months.    (3)Hx of pyelonephritis, recurrent UTIs.   evaluation negative results.    Recent URI sx, treated.    Check CBC, Ferritin, B12, CMP and RTC 1 month.                  Pt presenting s/p mechanical fall with pain to knee, low back and chest, will obtain xray of chest and lumbosacral spine and will d/c if normal. Normal neuro exam

## 2019-05-23 ENCOUNTER — OFFICE VISIT (OUTPATIENT)
Dept: HEMATOLOGY/ONCOLOGY | Facility: CLINIC | Age: 44
End: 2019-05-23
Payer: MEDICAID

## 2019-05-23 VITALS
WEIGHT: 233 LBS | TEMPERATURE: 98 F | DIASTOLIC BLOOD PRESSURE: 74 MMHG | RESPIRATION RATE: 20 BRPM | SYSTOLIC BLOOD PRESSURE: 118 MMHG | BODY MASS INDEX: 36.49 KG/M2 | HEART RATE: 84 BPM

## 2019-05-23 DIAGNOSIS — M54.50 CHRONIC MIDLINE LOW BACK PAIN WITHOUT SCIATICA: ICD-10-CM

## 2019-05-23 DIAGNOSIS — G89.29 CHRONIC MIDLINE LOW BACK PAIN WITHOUT SCIATICA: ICD-10-CM

## 2019-05-23 DIAGNOSIS — R93.7 ABNORMAL BONE XRAY: ICD-10-CM

## 2019-05-23 DIAGNOSIS — D63.8 ANEMIA IN OTHER CHRONIC DISEASES CLASSIFIED ELSEWHERE: Primary | ICD-10-CM

## 2019-05-23 DIAGNOSIS — N39.0 RECURRENT UTI (URINARY TRACT INFECTION): ICD-10-CM

## 2019-05-23 PROCEDURE — 99213 PR OFFICE/OUTPT VISIT, EST, LEVL III, 20-29 MIN: ICD-10-PCS | Mod: ,,, | Performed by: INTERNAL MEDICINE

## 2019-05-23 PROCEDURE — 99213 OFFICE O/P EST LOW 20 MIN: CPT | Mod: ,,, | Performed by: INTERNAL MEDICINE

## 2019-05-23 NOTE — PROGRESS NOTES
[facility] Follow-up    Subjective:      Patient ID:   Robina Potter                                       [address]  43 y.o. female                                           [place] [address]  1975  Dev Strong    Chief Complaint:   Anemia, LBP    HPI:  43 y.o. female with anemia 2nd decreased cellularity of bone marrow.  Chronic low back pain sx +.  Applies heat with relief.  Evaluation for malignancy negative.  Evaluation per Dr. Melchor negative.  GERD +.  Pyelonephritis +.  Fibroids hx.  Recurrant UTI.  Cystoscopy done.  Dr. Strong treated her for depression.  She is to follow-up with PMD for hernia.  She has GI sx, on meds per Dr. Strong..  She saw Dr. Strong, may need hernia repair.  Saw Dr. Spence for migraine eval? Sx better.  Bladder eval negative.  Dr. Méndez.  She denies bladder sx now.    She has increased bone and joint pain sx due to recent cold and damp weather.  Refill meds for pain control.    Recently URI sx. Treated with steroids and antibiotics.  Doing better.    Smoke 1/3 ppd.  Etoh no.  Job, security worker.    Cholecystectomy +, M0.    Mom  SLE.  1 child cerebral palsy.      ROS:   GEN: normal without any fever, night sweats or weight loss  HEENT:  HA's better  CV: normal with no CP, SOB, PND, SIMON or orthopnea  PULM: See HPI.  GI: GERD.  See history of present illness.  : Pyelonephritis hx. SEE HPI.  BREAST: normal with no mass, discharge, pain  SKIN: normal with no rash, erythema.    Review of patient's allergies indicates:  No Known Allergies    Current Outpatient Medications:     cetirizine (ZYRTEC) 10 MG tablet, , Disp: , Rfl:     clotrimazole (MYCELEX) 10 mg lazaro, , Disp: , Rfl:     DULoxetine (CYMBALTA) 60 MG capsule, Take 60 mg by mouth., Disp: , Rfl:     fluticasone (FLONASE) 50 mcg/actuation nasal spray, , Disp: , Rfl:     hydrocodone-acetaminophen (VICODIN) 5-500 mg per tablet, , Disp: , Rfl:     hydrocodone-acetaminophen 7.5-325mg (NORCO) 7.5-325 mg  per tablet, Take 1 tablet by mouth., Disp: , Rfl:     hydrOXYzine pamoate (VISTARIL) 25 MG Cap, TAKE 1 CAPSULE BY MOUTH 3 TIMES DAILY AS NEEDED FOR ITCHING, Disp: , Rfl:     loratadine (CLARITIN) 10 mg tablet, , Disp: , Rfl:     omeprazole (PRILOSEC) 20 MG capsule, , Disp: , Rfl:     topiramate (TOPAMAX) 100 MG tablet, , Disp: , Rfl:     trazodone (DESYREL) 100 MG tablet, , Disp: , Rfl:           Objective:   Vitals:  Blood pressure 118/74, pulse 84, temperature 98.1 °F (36.7 °C), resp. rate 20, weight 105.7 kg (233 lb).    Physical Examination:   GEN: no apparent distress, comfortable  HEAD: atraumatic and normocephalic  EYES: no pallor, no icterus  ENT: no pharyngeal erythema, external ears WNL; no nasal discharge  NECK: no masses, thyroid normal, trachea midline, no LAD/LN's, supple  CV: RRR with no murmur; normal pulse; normal S1 and S2; no pedal edema  CHEST: Normal respiratory effort; CTAB;  no wheeze or crackles  ABDOM: nontender and nondistended; soft;  no rebound/guarding, L/S NP  MUSC/Skeletal: ROM normal; no crepitus; joints normal; no deformities  EXTREM: no clubbing, cyanosis, inflammation   SKIN: no rashes, lesions, ulcers, petechiae  : no CVAT.  NEURO: grossly intact; motor/sensory WNL; no tremors  PSYCH: normal mood, affect and behavior  LYMPH: normal cervical, supraclavicular, axillary and groin LN's      Labs:     None recently      Radiology/Diagnostic Studies:    Mamm 1/2018 negative.  Bone marrow 2014, 25% cellularity.  IgG 2,000.        Assessment:   (1) 43 y.o. female with chronic anemia secondary to decreased bone marrow cellularity.    (2)Chronic LBP.  Refill meds.  RTC 1 months.    (3)Hx of pyelonephritis, recurrent UTIs.   evaluation negative results.    Recent URI sx, treated.    RTC 1 month

## 2019-06-27 ENCOUNTER — OFFICE VISIT (OUTPATIENT)
Dept: HEMATOLOGY/ONCOLOGY | Facility: CLINIC | Age: 44
End: 2019-06-27
Payer: MEDICAID

## 2019-06-27 DIAGNOSIS — D63.8 ANEMIA, CHRONIC DISEASE: ICD-10-CM

## 2019-06-27 DIAGNOSIS — G89.29 CHRONIC MIDLINE LOW BACK PAIN WITHOUT SCIATICA: ICD-10-CM

## 2019-06-27 DIAGNOSIS — N39.0 RECURRENT UTI (URINARY TRACT INFECTION): ICD-10-CM

## 2019-06-27 DIAGNOSIS — R93.7 ABNORMAL BONE XRAY: ICD-10-CM

## 2019-06-27 DIAGNOSIS — M54.50 CHRONIC MIDLINE LOW BACK PAIN WITHOUT SCIATICA: ICD-10-CM

## 2019-06-27 DIAGNOSIS — D63.8 ANEMIA IN OTHER CHRONIC DISEASES CLASSIFIED ELSEWHERE: Primary | ICD-10-CM

## 2019-06-27 PROCEDURE — 99213 PR OFFICE/OUTPT VISIT, EST, LEVL III, 20-29 MIN: ICD-10-PCS | Mod: ,,, | Performed by: INTERNAL MEDICINE

## 2019-06-27 PROCEDURE — 99213 OFFICE O/P EST LOW 20 MIN: CPT | Mod: ,,, | Performed by: INTERNAL MEDICINE

## 2019-06-30 NOTE — PROGRESS NOTES
[facility] Follow-up    Subjective:      Patient ID:   Robina Potter                                       [address]  43 y.o. female                                           [place] [address]  1975  Dev Strong    Chief Complaint:   Anemia, LBP    HPI:  43 y.o. female with anemia 2nd decreased cellularity of bone marrow.  Chronic low back pain sx +.  Applies heat with relief.  Evaluation for malignancy negative.  Evaluation per Dr. Melchor negative.  GERD +.  Pyelonephritis +.  Fibroids hx.  Recurrant UTI.  Cystoscopy done.  Dr. Strong treated her for depression.  She is to follow-up with PMD for hernia.  She has GI sx, on meds per Dr. Strong..  She saw Dr. Strong, may need hernia repair.  Saw Dr. Spence for migraine eval? Sx better.  Bladder eval negative.  Dr. Méndez.  She denies bladder sx now.    She has increased bone and joint pain sx.  Refill meds for pain control.    Smoke 1/3 ppd.  Etoh no.  Job, security worker.    Cholecystectomy +, M0.    Mom  SLE.  1 child cerebral palsy.      ROS:   GEN: normal without any fever, night sweats or weight loss  HEENT:  HA's better  CV: normal with no CP, SOB, PND, SIMON or orthopnea  PULM: See HPI.  GI: GERD.  See history of present illness.  : Pyelonephritis hx. SEE HPI.  BREAST: normal with no mass, discharge, pain  SKIN: normal with no rash, erythema.    Review of patient's allergies indicates:  No Known Allergies    Current Outpatient Medications:     cetirizine (ZYRTEC) 10 MG tablet, , Disp: , Rfl:     clotrimazole (MYCELEX) 10 mg lazaro, , Disp: , Rfl:     DULoxetine (CYMBALTA) 60 MG capsule, Take 60 mg by mouth., Disp: , Rfl:     fluticasone (FLONASE) 50 mcg/actuation nasal spray, , Disp: , Rfl:     hydrocodone-acetaminophen (VICODIN) 5-500 mg per tablet, , Disp: , Rfl:     hydrocodone-acetaminophen 7.5-325mg (NORCO) 7.5-325 mg per tablet, Take 1 tablet by mouth., Disp: , Rfl:     hydrOXYzine pamoate (VISTARIL) 25 MG Cap, TAKE 1 CAPSULE  BY MOUTH 3 TIMES DAILY AS NEEDED FOR ITCHING, Disp: , Rfl:     loratadine (CLARITIN) 10 mg tablet, , Disp: , Rfl:     omeprazole (PRILOSEC) 20 MG capsule, , Disp: , Rfl:     topiramate (TOPAMAX) 100 MG tablet, , Disp: , Rfl:     trazodone (DESYREL) 100 MG tablet, , Disp: , Rfl:           Objective:   Vitals:  There were no vitals taken for this visit.    Physical Examination:   GEN: no apparent distress, comfortable  HEAD: atraumatic and normocephalic  EYES: no pallor, no icterus  ENT: no pharyngeal erythema, external ears WNL; no nasal discharge  NECK: no masses, thyroid normal, trachea midline, no LAD/LN's, supple  CV: RRR with no murmur; normal pulse; normal S1 and S2; no pedal edema  CHEST: Normal respiratory effort; CTAB;  no wheeze or crackles  ABDOM: nontender and nondistended; soft;  no rebound/guarding, L/S NP  MUSC/Skeletal: ROM normal; no crepitus; joints normal; no deformities  EXTREM: no clubbing, cyanosis, inflammation   SKIN: no rashes, lesions, ulcers, petechiae  : no CVAT.  NEURO: grossly intact; motor/sensory WNL; no tremors  PSYCH: normal mood, affect and behavior  LYMPH: normal cervical, supraclavicular, axillary and groin LN's      Labs:     None recently      Radiology/Diagnostic Studies:    Mamm 1/2018 negative.  Bone marrow 2014, 25% cellularity.  IgG 2,000.        Assessment:   (1) 43 y.o. female with chronic anemia secondary to decreased bone marrow cellularity.    (2)Chronic LBP.  Refill meds.  RTC 1 months.    (3)Hx of pyelonephritis, recurrent UTIs.   evaluation negative results.    Recent URI sx, treated.    RTC 1 month

## 2019-07-25 ENCOUNTER — OFFICE VISIT (OUTPATIENT)
Dept: HEMATOLOGY/ONCOLOGY | Facility: CLINIC | Age: 44
End: 2019-07-25
Payer: MEDICAID

## 2019-07-25 DIAGNOSIS — D63.8 ANEMIA IN OTHER CHRONIC DISEASES CLASSIFIED ELSEWHERE: ICD-10-CM

## 2019-07-25 DIAGNOSIS — R93.7 ABNORMAL BONE XRAY: Primary | ICD-10-CM

## 2019-07-25 DIAGNOSIS — D50.9 IRON DEFICIENCY ANEMIA, UNSPECIFIED IRON DEFICIENCY ANEMIA TYPE: ICD-10-CM

## 2019-07-25 DIAGNOSIS — D51.8 ANEMIA OF DECREASED VITAMIN B12 ABSORPTION: ICD-10-CM

## 2019-07-25 PROCEDURE — 99213 OFFICE O/P EST LOW 20 MIN: CPT | Mod: ,,, | Performed by: INTERNAL MEDICINE

## 2019-07-25 PROCEDURE — 99213 PR OFFICE/OUTPT VISIT, EST, LEVL III, 20-29 MIN: ICD-10-PCS | Mod: ,,, | Performed by: INTERNAL MEDICINE

## 2019-07-25 NOTE — LETTER
July 25, 2019      Alexandra Guerra MD  [address]  [place] [address]           [facility]  [address]  [place] [address]  Phone: [phone]  Fax: [phone]          Patient: Robina Potter   MR Number: [id number]   YOB: 1975   Date of Visit: 7/25/2019       Dear Dr. Alexandra Guerra:    Thank you for referring Robina Potter to me for evaluation. Attached you will find relevant portions of my assessment and plan of care.    If you have questions, please do not hesitate to call me. I look forward to following Robina Potter along with you.    Sincerely,    SEAN Mccain MD    Enclosure  CC:  No Recipients    If you would like to receive this communication electronically, please contact [email or web address] or [phone] to request more information on [employer] access.    For providers and/or their staff who would like to refer a patient to [facility], please contact us through our one-stop-shop provider referral line, [facility], at [phone].    If you feel you have received this communication in error or would no longer like to receive these types of communications, please e-mail [email or web address]

## 2019-07-26 NOTE — PROGRESS NOTES
[facility] Hospital Follow-up    Subjective:      Patient ID:   Robina Potter                                       [address]  43 y.o. female                                           [place] [address]  1975  Dev Strong    Chief Complaint:   Anemia, LBP    HPI:  43 y.o. female with anemia 2nd decreased cellularity of bone marrow.  Chronic low back pain sx +.  Applies heat with relief.  Evaluation for malignancy negative.  Evaluation per Dr. Melchor negative.  GERD +.  Pyelonephritis +.  Fibroids hx.  Recurrant UTI.  Cystoscopy done.  Dr. Strong treated her for depression.  She is to follow-up with PMD for hernia.  She has GI sx, on meds per Dr. Strong..  She saw Dr. Strong, may need hernia repair.  Saw Dr. Spence for migraine eval? Sx better.  Bladder eval negative.  Dr. Méndez.  She denies bladder sx now.    She has increased bone and joint pain sx.  Refill meds for pain control.    Smoke 1/3 ppd.  Etoh no.  Job, security worker.    Cholecystectomy +, M0.    Mom  SLE.  1 child cerebral palsy.      ROS:   GEN: normal without any fever, night sweats or weight loss  HEENT:  HA's better  CV: normal with no CP, SOB, PND, SIMON or orthopnea  PULM: See HPI.  GI: GERD.  See history of present illness.  : Pyelonephritis hx. SEE HPI.  BREAST: normal with no mass, discharge, pain  SKIN: normal with no rash, erythema.    Review of patient's allergies indicates:  No Known Allergies    Current Outpatient Medications:     cetirizine (ZYRTEC) 10 MG tablet, , Disp: , Rfl:     clotrimazole (MYCELEX) 10 mg lazaro, , Disp: , Rfl:     DULoxetine (CYMBALTA) 60 MG capsule, Take 60 mg by mouth., Disp: , Rfl:     fluticasone (FLONASE) 50 mcg/actuation nasal spray, , Disp: , Rfl:     hydrocodone-acetaminophen (VICODIN) 5-500 mg per tablet, , Disp: , Rfl:     hydrocodone-acetaminophen 7.5-325mg (NORCO) 7.5-325 mg per tablet, Take 1 tablet by mouth., Disp: , Rfl:     hydrOXYzine pamoate (VISTARIL) 25 MG Cap, TAKE 1 CAPSULE  BY MOUTH 3 TIMES DAILY AS NEEDED FOR ITCHING, Disp: , Rfl:     loratadine (CLARITIN) 10 mg tablet, , Disp: , Rfl:     omeprazole (PRILOSEC) 20 MG capsule, , Disp: , Rfl:     topiramate (TOPAMAX) 100 MG tablet, , Disp: , Rfl:     trazodone (DESYREL) 100 MG tablet, , Disp: , Rfl:           Objective:   Vitals:  There were no vitals taken for this visit.    Physical Examination:   GEN: no apparent distress, comfortable  HEAD: atraumatic and normocephalic  EYES: no pallor, no icterus  ENT: no pharyngeal erythema, external ears WNL; no nasal discharge  NECK: no masses, thyroid normal, trachea midline, no LAD/LN's, supple  CV: RRR with no murmur; normal pulse; normal S1 and S2; no pedal edema  CHEST: Normal respiratory effort; CTAB;  no wheeze or crackles  ABDOM: nontender and nondistended; soft;  no rebound/guarding, L/S NP  MUSC/Skeletal: ROM normal; no crepitus; joints normal; no deformities  EXTREM: no clubbing, cyanosis, inflammation   SKIN: no rashes, lesions, ulcers, petechiae  : no CVAT.  NEURO: grossly intact; motor/sensory WNL; no tremors  PSYCH: normal mood, affect and behavior  LYMPH: normal cervical, supraclavicular, axillary and groin LN's      Labs:     None recently      Radiology/Diagnostic Studies:    Mamm 1/2018 negative.  Bone marrow 2014, 25% cellularity.  IgG 2,000.        Assessment:   (1) 43 y.o. female with chronic anemia secondary to decreased bone marrow cellularity.    (2)Chronic LBP.  Refill meds.      (3)Hx of pyelonephritis, recurrent UTIs.   evaluation negative results.    Recent URI sx, treated.    RTC 1 month with lab.

## 2019-09-04 ENCOUNTER — TELEPHONE (OUTPATIENT)
Dept: HEMATOLOGY/ONCOLOGY | Facility: CLINIC | Age: 44
End: 2019-09-04

## 2019-09-04 NOTE — TELEPHONE ENCOUNTER
----- Message from Marianna Hernandze sent at 9/4/2019 11:44 AM CDT -----  The patient said she called for a prescription of Norco 7.5mg #60 to be picked up in [place]. She called to see if it is ready. Please call her back at [phone].

## 2019-10-14 ENCOUNTER — TELEPHONE (OUTPATIENT)
Dept: HEMATOLOGY/ONCOLOGY | Facility: CLINIC | Age: 44
End: 2019-10-14

## 2019-10-14 NOTE — TELEPHONE ENCOUNTER
Called patient to see if she did her blood work for her upcoming appointment Thurs. Patient states she will go Tues to complete. Patient was reminded of date and time of appointment Thurs at [id number]

## 2019-10-17 ENCOUNTER — OFFICE VISIT (OUTPATIENT)
Dept: HEMATOLOGY/ONCOLOGY | Facility: CLINIC | Age: 44
End: 2019-10-17
Payer: MEDICAID

## 2019-10-17 VITALS
OXYGEN SATURATION: 99 % | DIASTOLIC BLOOD PRESSURE: 80 MMHG | HEART RATE: 76 BPM | TEMPERATURE: 96 F | BODY MASS INDEX: 38.06 KG/M2 | SYSTOLIC BLOOD PRESSURE: 116 MMHG | WEIGHT: 243 LBS

## 2019-10-17 DIAGNOSIS — R93.7 ABNORMAL BONE XRAY: ICD-10-CM

## 2019-10-17 DIAGNOSIS — D63.8 ANEMIA IN OTHER CHRONIC DISEASES CLASSIFIED ELSEWHERE: Primary | ICD-10-CM

## 2019-10-17 DIAGNOSIS — N39.0 RECURRENT UTI (URINARY TRACT INFECTION): ICD-10-CM

## 2019-10-17 PROCEDURE — 99213 PR OFFICE/OUTPT VISIT, EST, LEVL III, 20-29 MIN: ICD-10-PCS | Mod: S$GLB,,, | Performed by: INTERNAL MEDICINE

## 2019-10-17 PROCEDURE — 99213 OFFICE O/P EST LOW 20 MIN: CPT | Mod: S$GLB,,, | Performed by: INTERNAL MEDICINE

## 2019-10-18 NOTE — PROGRESS NOTES
[facility] Hospital Follow-up    Subjective:      Patient ID:   Robina Potter                                       [address]  44 y.o. female                                           [place] [address]  1975  Dev Strong    Chief Complaint:   Anemia, LBP    HPI:  44 y.o. female with anemia 2nd decreased cellularity of bone marrow.  Chronic low back pain sx +.  Applies heat with relief.  Evaluation for malignancy negative.  Evaluation per Dr. Melchor negative.  GERD +.  Pyelonephritis +.  Fibroids hx.  Recurrant UTI.  Cystoscopy done.  Dr. Strong treated her for depression.  She is to follow-up with PMD for hernia.  She has GI sx, on meds per Dr. Strong..  She saw Dr. Strong, may need hernia repair.  Saw Dr. Spence for migraine eval? Sx better.  Bladder eval negative.  Dr. Méndez.  She denies bladder sx now.    She has increased bone and joint pain sx.  Refill meds for pain control.    Smoke 1/3 ppd.  Etoh no.  Job, security worker.    Cholecystectomy +, M0.    Mom  SLE.  1 child cerebral palsy.      ROS:   GEN: normal without any fever, night sweats or weight loss  HEENT:  HA's better  CV: normal with no CP, SOB, PND, SIMON or orthopnea  PULM: See HPI.  GI: GERD.  See history of present illness.  : Pyelonephritis hx. SEE HPI.  BREAST: normal with no mass, discharge, pain  SKIN: normal with no rash, erythema.    Review of patient's allergies indicates:  No Known Allergies    Current Outpatient Medications:     cetirizine (ZYRTEC) 10 MG tablet, , Disp: , Rfl:     clotrimazole (MYCELEX) 10 mg lazaro, , Disp: , Rfl:     DULoxetine (CYMBALTA) 60 MG capsule, Take 60 mg by mouth., Disp: , Rfl:     fluticasone (FLONASE) 50 mcg/actuation nasal spray, , Disp: , Rfl:     hydrocodone-acetaminophen (VICODIN) 5-500 mg per tablet, , Disp: , Rfl:     hydrocodone-acetaminophen 7.5-325mg (NORCO) 7.5-325 mg per tablet, Take 1 tablet by mouth., Disp: , Rfl:     hydrOXYzine pamoate (VISTARIL) 25 MG Cap, TAKE 1 CAPSULE  BY MOUTH 3 TIMES DAILY AS NEEDED FOR ITCHING, Disp: , Rfl:     loratadine (CLARITIN) 10 mg tablet, , Disp: , Rfl:     omeprazole (PRILOSEC) 20 MG capsule, , Disp: , Rfl:     topiramate (TOPAMAX) 100 MG tablet, , Disp: , Rfl:     trazodone (DESYREL) 100 MG tablet, , Disp: , Rfl:           Objective:   Vitals:  Blood pressure 116/80, pulse 76, temperature 96.3 °F (35.7 °C), weight 110.2 kg (243 lb), SpO2 99 %.    Physical Examination:   GEN: no apparent distress, comfortable  HEAD: atraumatic and normocephalic  EYES: no pallor, no icterus  ENT: no pharyngeal erythema, external ears WNL; no nasal discharge  NECK: no masses, thyroid normal, trachea midline, no LAD/LN's, supple  CV: RRR with no murmur; normal pulse; normal S1 and S2; no pedal edema  CHEST: Normal respiratory effort; CTAB;  no wheeze or crackles  ABDOM: nontender and nondistended; soft;  no rebound/guarding, L/S NP  MUSC/Skeletal: ROM normal; no crepitus; joints normal; no deformities  EXTREM: no clubbing, cyanosis, inflammation   SKIN: no rashes, lesions, ulcers, petechiae  : no CVAT.  NEURO: grossly intact; motor/sensory WNL; no tremors  PSYCH: normal mood, affect and behavior  LYMPH: normal cervical, supraclavicular, axillary and groin LN's      Radiology/Diagnostic Studies:    Mamm 1/2018 negative.  Bone marrow 2014, 25% cellularity.  IgG 2,000.    Hgb 9.9, WBC 4100, plt cnt 480,000. MCV 88.  Ferritin 267, B12 602.        Assessment:   (1) 44 y.o. female with chronic anemia secondary to decreased bone marrow cellularity.  Blood counts low but stable.  Re check 6 months.    (2)Chronic LBP.  Refill meds.      (3)Hx of pyelonephritis, recurrent UTIs.   evaluation negative results.    Recent URI sx, treated.    RTC 1 month with lab.

## 2019-11-14 ENCOUNTER — OFFICE VISIT (OUTPATIENT)
Dept: HEMATOLOGY/ONCOLOGY | Facility: CLINIC | Age: 44
End: 2019-11-14
Payer: MEDICAID

## 2019-11-14 VITALS
HEART RATE: 86 BPM | BODY MASS INDEX: 38.06 KG/M2 | TEMPERATURE: 98 F | DIASTOLIC BLOOD PRESSURE: 73 MMHG | WEIGHT: 243 LBS | OXYGEN SATURATION: 99 % | SYSTOLIC BLOOD PRESSURE: 111 MMHG

## 2019-11-14 DIAGNOSIS — G89.29 CHRONIC MIDLINE LOW BACK PAIN WITHOUT SCIATICA: ICD-10-CM

## 2019-11-14 DIAGNOSIS — R93.7 ABNORMAL BONE XRAY: ICD-10-CM

## 2019-11-14 DIAGNOSIS — M54.50 CHRONIC MIDLINE LOW BACK PAIN WITHOUT SCIATICA: ICD-10-CM

## 2019-11-14 DIAGNOSIS — D63.8 ANEMIA IN OTHER CHRONIC DISEASES CLASSIFIED ELSEWHERE: Primary | ICD-10-CM

## 2019-11-14 PROCEDURE — 99213 PR OFFICE/OUTPT VISIT, EST, LEVL III, 20-29 MIN: ICD-10-PCS | Mod: S$GLB,,, | Performed by: INTERNAL MEDICINE

## 2019-11-14 PROCEDURE — 99213 OFFICE O/P EST LOW 20 MIN: CPT | Mod: S$GLB,,, | Performed by: INTERNAL MEDICINE

## 2019-11-16 NOTE — PROGRESS NOTES
[facility] Hospital Follow-up    Subjective:      Patient ID:   Robina Potter                                       [address] [facility] [facility].  44 y.o. female                                           [place] [address]  1975  Dev Strong    Chief Complaint:   Anemia, LBP    HPI:  44 y.o. female with anemia 2nd decreased cellularity of bone marrow.  Chronic low back pain sx +.  Applies heat with relief.  Admits to increased LBP sx, pain in hand and knee joints  with damp, cold weather of last few days.    Evaluation for malignancy negative.  Evaluation per Dr. Melchor negative.    GERD +.  Pyelonephritis +.  Fibroids hx.  Recurrant UTI.  Cystoscopy done.  Dr. Strong treated her for depression.  She is to follow-up with PMD for hernia.    Seen for chest pain sx, CAT negative for PE.  Dr. Strong.  She has GI sx, on meds per Dr. Strong..  She saw Dr. Strong, may need hernia repair.  Saw Dr. Spence for migraine eval? Sx better.  Bladder eval negative.  Dr. Méndez.  She denies bladder sx now.    She has increased bone and joint pain sx.  Refill meds for pain control.    Smoke 1/3 ppd.  Etoh no.  Job, security worker.    Cholecystectomy +, M0.    Mom  SLE.  1 child cerebral palsy.      ROS:   GEN: normal without any fever, night sweats or weight loss  HEENT:  HA's better  CV: normal with no CP, SOB, PND, SIMON or orthopnea  PULM: See HPI.  GI: GERD.  See history of present illness.  : Pyelonephritis hx. SEE HPI.  BREAST: normal with no mass, discharge, pain  SKIN: normal with no rash, erythema.    Review of patient's allergies indicates:  No Known Allergies    Current Outpatient Medications:     cetirizine (ZYRTEC) 10 MG tablet, , Disp: , Rfl:     clotrimazole (MYCELEX) 10 mg lazaro, , Disp: , Rfl:     DULoxetine (CYMBALTA) 60 MG capsule, Take 60 mg by mouth., Disp: , Rfl:     fluticasone (FLONASE) 50 mcg/actuation nasal spray, , Disp: , Rfl:     hydrocodone-acetaminophen (VICODIN) 5-500 mg per tablet, , Disp: , Rfl:      hydrocodone-acetaminophen 7.5-325mg (NORCO) 7.5-325 mg per tablet, Take 1 tablet by mouth., Disp: , Rfl:     hydrOXYzine pamoate (VISTARIL) 25 MG Cap, TAKE 1 CAPSULE BY MOUTH 3 TIMES DAILY AS NEEDED FOR ITCHING, Disp: , Rfl:     loratadine (CLARITIN) 10 mg tablet, , Disp: , Rfl:     omeprazole (PRILOSEC) 20 MG capsule, , Disp: , Rfl:     topiramate (TOPAMAX) 100 MG tablet, , Disp: , Rfl:     trazodone (DESYREL) 100 MG tablet, , Disp: , Rfl:           Objective:   Vitals:  Blood pressure 111/73, pulse 86, temperature 98.2 °F (36.8 °C), weight 110.2 kg (243 lb), SpO2 99 %.    Physical Examination:   GEN: no apparent distress, comfortable  HEAD: atraumatic and normocephalic  EYES: no pallor, no icterus  ENT: no pharyngeal erythema, external ears WNL; no nasal discharge  NECK: no masses, thyroid normal, trachea midline, no LAD/LN's, supple  CV: RRR with no murmur; normal pulse; normal S1 and S2; no pedal edema  CHEST: Normal respiratory effort; CTAB;  no wheeze or crackles  ABDOM: nontender and nondistended; soft;  no rebound/guarding, L/S NP  MUSC/Skeletal: ROM normal; no crepitus; joints normal; no deformities  EXTREM: no clubbing, cyanosis, inflammation   SKIN: no rashes, lesions, ulcers, petechiae  : no CVAT.  NEURO: grossly intact; motor/sensory WNL; no tremors  PSYCH: normal mood, affect and behavior  LYMPH: normal cervical, supraclavicular, axillary and groin LN's      Radiology/Diagnostic Studies:    Mamm 1/2018 negative.  Bone marrow 2014, 25% cellularity.  IgG 2,000.    Hgb 9.9, WBC 4100, plt cnt 480,000. MCV 88.  Ferritin 267, B12 602.        Assessment:   (1) 44 y.o. female with chronic anemia secondary to decreased bone marrow cellularity.  Blood counts low but stable.  Re check 6 months.    (2)Chronic LBP.  Refill meds.      (3)Hx of pyelonephritis, recurrent UTIs.   evaluation negative results.    Recent URI sx, treated.    RTC 1 month with lab.

## 2019-12-23 ENCOUNTER — TELEPHONE (OUTPATIENT)
Dept: HEMATOLOGY/ONCOLOGY | Facility: CLINIC | Age: 44
End: 2019-12-23

## 2019-12-23 NOTE — TELEPHONE ENCOUNTER
----- Message from Tameka Mejias sent at 12/23/2019  8:56 AM CST -----  Contact: patient  Request refill for Norco 7.5  Please call when ready to . Patient notified office will close early tomorrow.

## 2020-01-23 ENCOUNTER — OFFICE VISIT (OUTPATIENT)
Dept: HEMATOLOGY/ONCOLOGY | Facility: CLINIC | Age: 45
End: 2020-01-23
Payer: MEDICAID

## 2020-01-23 VITALS
RESPIRATION RATE: 19 BRPM | DIASTOLIC BLOOD PRESSURE: 66 MMHG | BODY MASS INDEX: 36.88 KG/M2 | HEART RATE: 83 BPM | WEIGHT: 235.5 LBS | TEMPERATURE: 99 F | SYSTOLIC BLOOD PRESSURE: 102 MMHG

## 2020-01-23 DIAGNOSIS — G89.29 CHRONIC MIDLINE LOW BACK PAIN WITHOUT SCIATICA: ICD-10-CM

## 2020-01-23 DIAGNOSIS — R93.7 ABNORMAL BONE XRAY: ICD-10-CM

## 2020-01-23 DIAGNOSIS — D63.8 ANEMIA IN OTHER CHRONIC DISEASES CLASSIFIED ELSEWHERE: Primary | ICD-10-CM

## 2020-01-23 DIAGNOSIS — M54.50 CHRONIC MIDLINE LOW BACK PAIN WITHOUT SCIATICA: ICD-10-CM

## 2020-01-23 DIAGNOSIS — N39.0 RECURRENT UTI (URINARY TRACT INFECTION): ICD-10-CM

## 2020-01-23 PROCEDURE — 99213 PR OFFICE/OUTPT VISIT, EST, LEVL III, 20-29 MIN: ICD-10-PCS | Mod: S$GLB,,, | Performed by: INTERNAL MEDICINE

## 2020-01-23 PROCEDURE — 99213 OFFICE O/P EST LOW 20 MIN: CPT | Mod: S$GLB,,, | Performed by: INTERNAL MEDICINE

## 2020-01-25 NOTE — PROGRESS NOTES
[facility] Hospital Follow-up    Subjective:      Patient ID:   Robina Potter                                       [address] [facility].  44 y.o. female                                           [place] [address]  1975  Dev Strong    Chief Complaint:   Anemia, LBP    HPI:  44 y.o. female with anemia 2nd decreased cellularity of bone marrow.  Chronic low back pain sx +.  Applies heat with relief.  Admits to increased LBP sx, pain in hand and knee joints  with damp, cold weather of last few days.    Evaluation for malignancy negative.  Evaluation per Dr. Melchor negative.    GERD +.  Pyelonephritis +.  Fibroids hx.  Recurrant UTI.  Cystoscopy done.  Dr. Strong treated her for depression.  She is to follow-up with PMD for hernia.    Seen for chest pain sx, CAT negative for PE.  Dr. Strong.  She has GI sx, on meds per Dr. Strong..  She saw Dr. Strong, may need hernia repair.  Saw Dr. Spence for migraine eval? Sx better.  Bladder eval negative.  Dr. Méndez.  She denies bladder sx now.    No recent URI or UTI sx.    She has increased bone and joint pain sx.  Refill meds for pain control.    Smoke 1/3 ppd.  Etoh no.  Job, security worker.    Cholecystectomy +, M0.    Mom  SLE.  1 child cerebral palsy.      ROS:   GEN: normal without any fever, night sweats or weight loss  HEENT:  HA's better  CV: normal with no CP, SOB, PND, SIMON or orthopnea  PULM: See HPI.  GI: GERD.  See history of present illness.  : Pyelonephritis hx. SEE HPI.  BREAST: normal with no mass, discharge, pain  SKIN: normal with no rash, erythema.    Review of patient's allergies indicates:  No Known Allergies    Current Outpatient Medications:     cetirizine (ZYRTEC) 10 MG tablet, , Disp: , Rfl:     clotrimazole (MYCELEX) 10 mg lazaro, , Disp: , Rfl:     DULoxetine (CYMBALTA) 60 MG capsule, Take 60 mg by mouth., Disp: , Rfl:     fluticasone (FLONASE) 50 mcg/actuation nasal spray, , Disp: , Rfl:     hydrocodone-acetaminophen (VICODIN) 5-500 mg per  tablet, , Disp: , Rfl:     hydrocodone-acetaminophen 7.5-325mg (NORCO) 7.5-325 mg per tablet, Take 1 tablet by mouth., Disp: , Rfl:     hydrOXYzine pamoate (VISTARIL) 25 MG Cap, TAKE 1 CAPSULE BY MOUTH 3 TIMES DAILY AS NEEDED FOR ITCHING, Disp: , Rfl:     loratadine (CLARITIN) 10 mg tablet, , Disp: , Rfl:     omeprazole (PRILOSEC) 20 MG capsule, , Disp: , Rfl:     topiramate (TOPAMAX) 100 MG tablet, , Disp: , Rfl:     trazodone (DESYREL) 100 MG tablet, , Disp: , Rfl:           Objective:   Vitals:  Blood pressure 102/66, pulse 83, temperature 99.1 °F (37.3 °C), temperature source Oral, resp. rate 19, weight 106.8 kg (235 lb 8 oz).    Physical Examination:   GEN: no apparent distress, comfortable  HEAD: atraumatic and normocephalic  EYES: no pallor, no icterus  ENT: no pharyngeal erythema, external ears WNL; no nasal discharge  NECK: no masses, thyroid normal, trachea midline, no LAD/LN's, supple  CV: RRR with no murmur; normal pulse; normal S1 and S2; no pedal edema  CHEST: Normal respiratory effort; CTAB;  no wheeze or crackles  ABDOM: nontender and nondistended; soft;  no rebound/guarding, L/S NP  MUSC/Skeletal: ROM normal; no crepitus; joints normal; no deformities  EXTREM: no clubbing, cyanosis, inflammation   SKIN: no rashes, lesions, ulcers, petechiae  : no CVAT.  NEURO: grossly intact; motor/sensory WNL; no tremors  PSYCH: normal mood, affect and behavior  LYMPH: normal cervical, supraclavicular, axillary and groin LN's      Radiology/Diagnostic Studies:    Mamm 1/2018 negative.  Bone marrow 2014, 25% cellularity.  IgG 2,000.    Hgb 9.9, WBC 4100, plt cnt 480,000. MCV 88.  Ferritin 267, B12 602.        Assessment:   (1) 44 y.o. female with chronic anemia secondary to decreased bone marrow cellularity.  Blood counts low but stable.  Re check 6 months.    (2)Chronic LBP.  Refill meds.      (3)Hx of pyelonephritis, recurrent UTIs.   evaluation negative results.    Recent URI sx, treated.    RTC 1 month  with lab.

## 2020-02-17 ENCOUNTER — TELEPHONE (OUTPATIENT)
Dept: HEMATOLOGY/ONCOLOGY | Facility: CLINIC | Age: 45
End: 2020-02-17

## 2020-02-17 NOTE — TELEPHONE ENCOUNTER
----- Message from Marianna Hernandez sent at 2/17/2020 11:53 AM CST -----  The patient wants a prescription for Norco 7.5mg #60. Please call her when ready at [phone].

## 2020-04-21 ENCOUNTER — TELEPHONE (OUTPATIENT)
Dept: HEMATOLOGY/ONCOLOGY | Facility: CLINIC | Age: 45
End: 2020-04-21

## 2020-04-21 NOTE — TELEPHONE ENCOUNTER
Called the pt to change her 04/22 appointment @ 1:45 to a virtual visit. Left a voice message and asked her to call the office to let us know if we could set her up for a video visit or an audio only visit.

## 2020-04-22 ENCOUNTER — OFFICE VISIT (OUTPATIENT)
Dept: HEMATOLOGY/ONCOLOGY | Facility: CLINIC | Age: 45
End: 2020-04-22
Payer: MEDICAID

## 2020-04-22 DIAGNOSIS — G89.29 CHRONIC MIDLINE LOW BACK PAIN WITHOUT SCIATICA: ICD-10-CM

## 2020-04-22 DIAGNOSIS — R93.7 ABNORMAL BONE XRAY: ICD-10-CM

## 2020-04-22 DIAGNOSIS — D63.8 ANEMIA IN OTHER CHRONIC DISEASES CLASSIFIED ELSEWHERE: Primary | ICD-10-CM

## 2020-04-22 DIAGNOSIS — N39.0 RECURRENT UTI (URINARY TRACT INFECTION): ICD-10-CM

## 2020-04-22 DIAGNOSIS — Z87.01 HISTORY OF RECENT PNEUMONIA: ICD-10-CM

## 2020-04-22 DIAGNOSIS — M54.50 CHRONIC MIDLINE LOW BACK PAIN WITHOUT SCIATICA: ICD-10-CM

## 2020-04-22 PROCEDURE — 99441 PR PHYSICIAN TELEPHONE EVALUATION 5-10 MIN: CPT | Mod: GT,,, | Performed by: INTERNAL MEDICINE

## 2020-04-22 PROCEDURE — 99441 PR PHYSICIAN TELEPHONE EVALUATION 5-10 MIN: ICD-10-PCS | Mod: GT,,, | Performed by: INTERNAL MEDICINE

## 2020-04-23 NOTE — PROGRESS NOTES
[facility] telemedicine audio phone visit progress note  2020    Patient is in isolation at home because of the corona virus epidemic    This is a phone audio visit in lieu of in person visit because of corona virus emergency.  She acknowledges and consents to phone audio visit today as part of outpatient care.    Subjective:      Patient ID:   Robina Potter                                       [address]  44 y.o. female                                           [place] [address]  1975  Dev Strong    Chief Complaint:   Anemia, LBP    HPI:  44 y.o. female with anemia 2nd decreased cellularity of bone marrow.  Chronic low back pain sx +.  Applies heat with relief.  Admits to increased LBP sx, pain in hand and knee joints  with damp, cold weather of last few days.    She was recently hospitalized approximately 2 months ago with pneumonia, managed with antibiotics per Dr. Strong.    Evaluation for malignancy negative.  Evaluation per Dr. Melchor negative.    GERD +.  Pyelonephritis +.  Fibroids hx.  Recurrant UTI.  Cystoscopy done.  Dr. Strong treated her for depression.  She is to follow-up with PMD for hernia.    Seen for chest pain sx, CAT negative for PE.  Dr. Strong.  She has GI sx, on meds per Dr. Strong..  She saw Dr. Strong, may need hernia repair.  Saw Dr. Spence for migraine eval? Sx better.  Bladder eval negative.  Dr. Méndez.  She denies bladder sx now.    No recent  UTI sx.    She has increased bone and joint pain sx.  Refill meds for pain control.    Smoke 1/3 ppd.  Etoh no.  Job, security worker.    Cholecystectomy +, M0.    Mom  SLE.  1 child cerebral palsy.      ROS:   GEN: normal without any fever, night sweats or weight loss  HEENT:  HA's better  CV: normal with no CP, SOB, PND, SIMON or orthopnea  PULM: See HPI.  GI: GERD.  See history of present illness.  : Pyelonephritis hx. SEE HPI.  BREAST: normal with no mass, discharge, pain  SKIN: normal with no rash, erythema.    Review of  patient's allergies indicates:  No Known Allergies    Current Outpatient Medications:     cetirizine (ZYRTEC) 10 MG tablet, , Disp: , Rfl:     clotrimazole (MYCELEX) 10 mg lazaro, , Disp: , Rfl:     DULoxetine (CYMBALTA) 60 MG capsule, Take 60 mg by mouth., Disp: , Rfl:     fluticasone (FLONASE) 50 mcg/actuation nasal spray, , Disp: , Rfl:     hydrocodone-acetaminophen (VICODIN) 5-500 mg per tablet, , Disp: , Rfl:     hydrocodone-acetaminophen 7.5-325mg (NORCO) 7.5-325 mg per tablet, Take 1 tablet by mouth., Disp: , Rfl:     hydrOXYzine pamoate (VISTARIL) 25 MG Cap, TAKE 1 CAPSULE BY MOUTH 3 TIMES DAILY AS NEEDED FOR ITCHING, Disp: , Rfl:     loratadine (CLARITIN) 10 mg tablet, , Disp: , Rfl:     omeprazole (PRILOSEC) 20 MG capsule, , Disp: , Rfl:     topiramate (TOPAMAX) 100 MG tablet, , Disp: , Rfl:     trazodone (DESYREL) 100 MG tablet, , Disp: , Rfl:           Objective:   Vitals:  There were no vitals taken for this visit.    Physical Examination:   GEN: no apparent distress, comfortable  HEAD: atraumatic and normocephalic  EYES: no pallor, no icterus  ENT: no pharyngeal erythema, external ears WNL; no nasal discharge  NECK: no masses, thyroid normal, trachea midline, no LAD/LN's, supple  CV: RRR with no murmur; normal pulse; normal S1 and S2; no pedal edema  CHEST: Normal respiratory effort; CTAB;  no wheeze or crackles  ABDOM: nontender and nondistended; soft;  no rebound/guarding, L/S NP  MUSC/Skeletal: ROM normal; no crepitus; joints normal; no deformities  EXTREM: no clubbing, cyanosis, inflammation   SKIN: no rashes, lesions, ulcers, petechiae  : no CVAT.  NEURO: grossly intact; motor/sensory WNL; no tremors  PSYCH: normal mood, affect and behavior  LYMPH: normal cervical, supraclavicular, axillary and groin LN's    Physical exam above is as per the visit the last time she was in the office.      Radiology/Diagnostic Studies:    Mamm 1/2018 negative.  Bone marrow 2014, 25% cellularity.  IgG  2,000.    Hgb 9.9, WBC 4100, plt cnt 480,000. MCV 88.  Ferritin 267, B12 602.        Assessment:   (1) 44 y.o. female with chronic anemia secondary to decreased bone marrow cellularity.  Blood counts low but stable.  Re check 6 months.    (2)Chronic LBP.  Refill meds.      (3)Hx of pyelonephritis, recurrent UTIs.   evaluation negative results.    Recent pneumonia 2 months ago, treated per Dr. Strong.    RTC 1 month with lab.  I estimate 10 min spent on the audio phone visit with the patient today

## 2020-05-26 ENCOUNTER — TELEPHONE (OUTPATIENT)
Dept: HEMATOLOGY/ONCOLOGY | Facility: CLINIC | Age: 45
End: 2020-05-26

## 2020-05-26 ENCOUNTER — OFFICE VISIT (OUTPATIENT)
Dept: HEMATOLOGY/ONCOLOGY | Facility: CLINIC | Age: 45
End: 2020-05-26
Payer: MEDICAID

## 2020-05-26 DIAGNOSIS — Z87.01 HISTORY OF RECENT PNEUMONIA: ICD-10-CM

## 2020-05-26 DIAGNOSIS — R93.7 ABNORMAL BONE XRAY: ICD-10-CM

## 2020-05-26 DIAGNOSIS — D63.8 ANEMIA IN OTHER CHRONIC DISEASES CLASSIFIED ELSEWHERE: Primary | ICD-10-CM

## 2020-05-26 DIAGNOSIS — D53.9 ANEMIA ASSOCIATED WITH NUTRITIONAL DEFICIENCY: ICD-10-CM

## 2020-05-26 PROCEDURE — 99441 PR PHYSICIAN TELEPHONE EVALUATION 5-10 MIN: ICD-10-PCS | Mod: GT,,, | Performed by: INTERNAL MEDICINE

## 2020-05-26 PROCEDURE — 99441 PR PHYSICIAN TELEPHONE EVALUATION 5-10 MIN: CPT | Mod: GT,,, | Performed by: INTERNAL MEDICINE

## 2020-05-27 NOTE — PROGRESS NOTES
Established Patient - Audio Only Telehealth Visit     The patient location is:  SHE IS ON ISOLATION AT HOME BECAUSE OF CORONA VIRUS EPIDEMIC  The chief complaint leading to consultation is:  CHRONIC ANEMIA AND PANCYTOPENIA  Visit type: Virtual visit with audio only (telephone)  Total time spent with patient:  10 MIN       The reason for the audio only service rather than synchronous audio and video virtual visit was related to technical difficulties or patient preference/necessity.     Each patient to whom I provide medical services by telemedicine is:  (1) informed of the relationship between the physician and patient and the respective role of any other health care provider with respect to management of the patient; and (2) notified that they may decline to receive medical services by telemedicine and may withdraw from such care at any time. Patient verbally consented to receive this service via voice-only telephone CALL    .[facility] telemedicine audio phone visit progress note  May 26,2020    Patient is in isolation at home because of the corona virus epidemic    This is a phone audio visit in lieu of in person visit because of corona virus emergency.  She acknowledges and consents to phone audio visit today as part of outpatient care.    Subjective:      Patient ID:   Robina MORGAN Tariq                                       [address] [facility].  44 y.o. female                                           [place] [id number]  1975  Dev Strong    Chief Complaint:   Anemia, LBP    HPI:  44 y.o. female with anemia 2nd decreased cellularity of bone marrow.  Chronic low back pain sx +.  Applies heat with relief.  Admits to increased LBP sx, pain in hand and knee joints  with damp, cold weather of last few days.    She was recently hospitalized approximately 2 months ago with pneumonia, managed with antibiotics per Dr. Strong.  She is due for a follow-up chest x-ray next week at [facility].  Ordered per Dr. Strong.  Will  order lab work at [facility] for the re-evaluation of her anemia.    Evaluation for malignancy negative.  Evaluation per Dr. Melchor negative.    GERD +.  Pyelonephritis +.  Fibroids hx.  Recurrant UTI.  Cystoscopy done.  Dr. Strong treated her for depression.  She is to follow-up with PMD for hernia.    Seen for chest pain sx, CAT negative for PE.  Dr. Strong.  She has GI sx, on meds per Dr. Strong..  She saw Dr. Strong, may need hernia repair.  Saw Dr. Spence for migraine eval? Sx better.  Bladder eval negative.  Dr. Méndez.  She denies bladder sx now.    No recent  UTI sx.    She has increased bone and joint pain sx.  Refill meds for pain control.    Smoke 1/3 ppd.  Etoh no.  Job, security worker.    Cholecystectomy +, M0.    Mom  SLE.  1 child cerebral palsy.      ROS:   GEN: normal without any fever, night sweats or weight loss  HEENT:  HA's better  CV: normal with no CP, SOB, PND, SIMON or orthopnea  PULM: See HPI.  GI: GERD.  See history of present illness.  : Pyelonephritis hx. SEE HPI.  BREAST: normal with no mass, discharge, pain  SKIN: normal with no rash, erythema.    Review of patient's allergies indicates:  No Known Allergies    Current Outpatient Medications:     cetirizine (ZYRTEC) 10 MG tablet, , Disp: , Rfl:     clotrimazole (MYCELEX) 10 mg lazaro, , Disp: , Rfl:     DULoxetine (CYMBALTA) 60 MG capsule, Take 60 mg by mouth., Disp: , Rfl:     fluticasone (FLONASE) 50 mcg/actuation nasal spray, , Disp: , Rfl:     hydrocodone-acetaminophen (VICODIN) 5-500 mg per tablet, , Disp: , Rfl:     hydrocodone-acetaminophen 7.5-325mg (NORCO) 7.5-325 mg per tablet, Take 1 tablet by mouth., Disp: , Rfl:     hydrOXYzine pamoate (VISTARIL) 25 MG Cap, TAKE 1 CAPSULE BY MOUTH 3 TIMES DAILY AS NEEDED FOR ITCHING, Disp: , Rfl:     loratadine (CLARITIN) 10 mg tablet, , Disp: , Rfl:     omeprazole (PRILOSEC) 20 MG capsule, , Disp: , Rfl:     topiramate (TOPAMAX) 100 MG tablet, , Disp: , Rfl:     trazodone  (DESYREL) 100 MG tablet, , Disp: , Rfl:           Objective:   Vitals:  There were no vitals taken for this visit.    Physical Examination:   GEN: no apparent distress, comfortable  HEAD: atraumatic and normocephalic  EYES: no pallor, no icterus  ENT: no pharyngeal erythema, external ears WNL; no nasal discharge  NECK: no masses, thyroid normal, trachea midline, no LAD/LN's, supple  CV: RRR with no murmur; normal pulse; normal S1 and S2; no pedal edema  CHEST: Normal respiratory effort; CTAB;  no wheeze or crackles  ABDOM: nontender and nondistended; soft;  no rebound/guarding, L/S NP  MUSC/Skeletal: ROM normal; no crepitus; joints normal; no deformities  EXTREM: no clubbing, cyanosis, inflammation   SKIN: no rashes, lesions, ulcers, petechiae  : no CVAT.  NEURO: grossly intact; motor/sensory WNL; no tremors  PSYCH: normal mood, affect and behavior  LYMPH: normal cervical, supraclavicular, axillary and groin LN's    Physical exam above is as per the visit the last time she was in the office.      Radiology/Diagnostic Studies:    Mamm 1/2018 negative.  Bone marrow 2014, 25% cellularity.  IgG 2,000.    Hgb 9.9, WBC 4100, plt cnt 480,000. MCV 88.  Ferritin 267, B12 602.        Assessment:   (1) 44 y.o. female with chronic anemia secondary to decreased bone marrow cellularity.  Blood counts low but stable.  Re check 6 months.    (2)Chronic LBP.  Refill meds.      (3)Hx of pyelonephritis, recurrent UTIs.   evaluation negative results.    Recent pneumonia 2 months ago, treated per Dr. Strong.    RTC 1 month with lab.  I estimate 10 min spent on the audio phone visit with the patient today                    This service was not originating from a related E/M service provided within the previous 7 days nor will  to an E/M service or procedure within the next 24 hours or my soonest available appointment.  Prevailing standard of care was able to be met in this audio-only visit.

## 2020-05-27 NOTE — TELEPHONE ENCOUNTER
Kevin,  Will you write out her 2 Rx for the norco?  I will sign it.  She wants to pick it up 5/27/20.  RTC 1 month.

## 2020-07-13 ENCOUNTER — OFFICE VISIT (OUTPATIENT)
Dept: HEMATOLOGY/ONCOLOGY | Facility: CLINIC | Age: 45
End: 2020-07-13
Payer: MEDICAID

## 2020-07-13 VITALS
BODY MASS INDEX: 34.68 KG/M2 | RESPIRATION RATE: 19 BRPM | DIASTOLIC BLOOD PRESSURE: 87 MMHG | HEART RATE: 79 BPM | TEMPERATURE: 99 F | WEIGHT: 221.38 LBS | SYSTOLIC BLOOD PRESSURE: 139 MMHG

## 2020-07-13 DIAGNOSIS — D47.2 MGUS (MONOCLONAL GAMMOPATHY OF UNKNOWN SIGNIFICANCE): ICD-10-CM

## 2020-07-13 DIAGNOSIS — D53.9 NON MEGALOBLASTIC NUTRITIONAL ANEMIA: ICD-10-CM

## 2020-07-13 DIAGNOSIS — R07.9 CHEST PAIN IN ADULT: Primary | ICD-10-CM

## 2020-07-13 PROCEDURE — 99213 OFFICE O/P EST LOW 20 MIN: CPT | Mod: S$GLB,,, | Performed by: INTERNAL MEDICINE

## 2020-07-13 PROCEDURE — 99213 PR OFFICE/OUTPT VISIT, EST, LEVL III, 20-29 MIN: ICD-10-PCS | Mod: S$GLB,,, | Performed by: INTERNAL MEDICINE

## 2020-07-14 NOTE — PROGRESS NOTES
[facility] Hematology Oncology In Office Encounter Progress Note  20    Subjective:      Patient ID:   Robina Potter                                       [address]  44 y.o. female                                           [place] [address]  1975  Dev Strong    Chief Complaint:   Anemia, LBP    HPI:  44 y.o. female with anemia 2nd decreased cellularity of bone marrow.  Chronic low back pain sx +.  Applies heat with relief.  Admits to increased LBP sx, pain in hand and knee joints  with damp, cold weather of last few days.    She c/o sternal pain and fullness.    Evaluation for malignancy negative.  Evaluation per Dr. Melchor negative.    GERD +.  Pyelonephritis +.  Fibroids hx.  Recurrant UTI.  Cystoscopy done.  Dr. Strong treated her for depression.  She is to follow-up with PMD for hernia.    Seen for chest pain sx, CAT negative for PE.  Dr. Strong.  She has GI sx, on meds per Dr. Strong..  She saw Dr. Strong, may need hernia repair.  Saw Dr. Spence for migraine eval? Sx better.  Bladder eval negative.  Dr. Méndez.  She denies bladder sx now.    No recent URI or UTI sx.    She has increased bone and joint pain sx.  Refill meds for pain control.    Smoke 1/3 ppd.  Etoh no.  Job, security worker.    Cholecystectomy +, M0.    Mom  SLE.  1 child cerebral palsy.      ROS:   GEN: normal without any fever, night sweats or weight loss  HEENT:  HA's better  CV: normal with no CP, SOB, PND, SIMON or orthopnea  PULM: See HPI.  GI: GERD.  See history of present illness.  : Pyelonephritis hx. SEE HPI.  BREAST: normal with no mass, discharge, pain  SKIN: normal with no rash, erythema.    Review of patient's allergies indicates:  No Known Allergies    Current Outpatient Medications:     cetirizine (ZYRTEC) 10 MG tablet, , Disp: , Rfl:     clotrimazole (MYCELEX) 10 mg lazaro, , Disp: , Rfl:     DULoxetine (CYMBALTA) 60 MG capsule, Take 60 mg by mouth., Disp: , Rfl:     fluticasone (FLONASE) 50  mcg/actuation nasal spray, , Disp: , Rfl:     hydrocodone-acetaminophen (VICODIN) 5-500 mg per tablet, , Disp: , Rfl:     hydrocodone-acetaminophen 7.5-325mg (NORCO) 7.5-325 mg per tablet, Take 1 tablet by mouth., Disp: , Rfl:     hydrOXYzine pamoate (VISTARIL) 25 MG Cap, TAKE 1 CAPSULE BY MOUTH 3 TIMES DAILY AS NEEDED FOR ITCHING, Disp: , Rfl:     loratadine (CLARITIN) 10 mg tablet, , Disp: , Rfl:     omeprazole (PRILOSEC) 20 MG capsule, , Disp: , Rfl:     topiramate (TOPAMAX) 100 MG tablet, , Disp: , Rfl:     trazodone (DESYREL) 100 MG tablet, , Disp: , Rfl:           Objective:   Vitals:  Blood pressure 139/87, pulse 79, temperature 98.9 °F (37.2 °C), temperature source Oral, resp. rate 19, weight 100.4 kg (221 lb 6.4 oz).    Physical Examination:   GEN: no apparent distress, comfortable  HEAD: atraumatic and normocephalic  EYES: no pallor, no icterus  ENT: no pharyngeal erythema, external ears WNL; no nasal discharge  NECK: no masses, thyroid normal, trachea midline, no LAD/LN's, supple  CV: RRR with no murmur; normal pulse; normal S1 and S2; no pedal edema  CHEST: Normal respiratory effort; CTAB;  no wheeze or crackles.  I believe the soft tissue over the sternum is full.  ABDOM: nontender and nondistended; soft;  no rebound/guarding, L/S NP  MUSC/Skeletal: ROM normal; no crepitus; joints normal; no deformities  EXTREM: no clubbing, cyanosis, inflammation   SKIN: no rashes, lesions, ulcers, petechiae  : no CVAT.  NEURO: grossly intact; motor/sensory WNL; no tremors  PSYCH: normal mood, affect and behavior  LYMPH: normal cervical, supraclavicular, axillary and groin LN's      Radiology/Diagnostic Studies:    Mamm 1/2018 negative.  Bone marrow 2014, 25% cellularity.  IgG 2,000.    Hgb 9.9, WBC 4100, plt cnt 480,000. MCV 88.  Ferritin 267, B12 602.        Assessment:   (1) 44 y.o. female with chronic anemia secondary to decreased bone marrow cellularity.  Blood counts low but stable.  Re check  now.    (2)Chronic LBP.  Refill meds.      (3)Hx of pyelonephritis, recurrent UTIs.   evaluation negative results.    Recent URI sx, treated.    New C/O of sternal pain and fullness.  Check CAT of chest.    RTC 1 week.

## 2020-08-11 ENCOUNTER — OFFICE VISIT (OUTPATIENT)
Dept: HEMATOLOGY/ONCOLOGY | Facility: CLINIC | Age: 45
End: 2020-08-11
Payer: MEDICAID

## 2020-08-11 ENCOUNTER — TELEPHONE (OUTPATIENT)
Dept: HEMATOLOGY/ONCOLOGY | Facility: CLINIC | Age: 45
End: 2020-08-11

## 2020-08-11 VITALS
WEIGHT: 222.88 LBS | HEART RATE: 77 BPM | BODY MASS INDEX: 34.91 KG/M2 | DIASTOLIC BLOOD PRESSURE: 73 MMHG | RESPIRATION RATE: 18 BRPM | SYSTOLIC BLOOD PRESSURE: 104 MMHG | TEMPERATURE: 97 F

## 2020-08-11 DIAGNOSIS — D63.8 ANEMIA IN OTHER CHRONIC DISEASES CLASSIFIED ELSEWHERE: ICD-10-CM

## 2020-08-11 DIAGNOSIS — Z12.31 SCREENING MAMMOGRAM, ENCOUNTER FOR: ICD-10-CM

## 2020-08-11 DIAGNOSIS — R07.9 CHEST PAIN IN ADULT: Primary | ICD-10-CM

## 2020-08-11 DIAGNOSIS — R93.7 ABNORMAL BONE XRAY: ICD-10-CM

## 2020-08-11 PROCEDURE — 99213 OFFICE O/P EST LOW 20 MIN: CPT | Mod: S$GLB,,, | Performed by: INTERNAL MEDICINE

## 2020-08-11 PROCEDURE — 99213 PR OFFICE/OUTPT VISIT, EST, LEVL III, 20-29 MIN: ICD-10-PCS | Mod: S$GLB,,, | Performed by: INTERNAL MEDICINE

## 2020-08-12 NOTE — TELEPHONE ENCOUNTER
Tell patient I have ordered a bone scan and mammogram at [facility] to evaluate her chest pain symptoms.  Should be done in 2 weeks and she will follow-up here in 1 month.

## 2020-08-12 NOTE — PROGRESS NOTES
[facility] Hematology Oncology In Office Encounter Progress Note  20    Subjective:      Patient ID:   Robina Potter                                       [address]  44 y.o. female                                           [place] [address]  1975  Dev Strong    Chief Complaint:   Anemia, LBP    HPI:  44 y.o. female with anemia 2nd decreased cellularity of bone marrow.  Chronic low back pain sx +.  Applies heat with relief.  Admits to increased LBP sx, pain in hand and knee joints  with damp, cold weather of last few days.    She c/o sternal pain and fullness.      GERD +.  Pyelonephritis +.  Fibroids hx.  Recurrant UTI.  Cystoscopy done.  Dr. Strong treated her for depression.  She is to follow-up with PMD for hernia.    Seen for chest pain sx, CAT negative for PE.  Dr. Strong.  She has GI sx, on meds per Dr. Strong..  She saw Dr. Strong, may need hernia repair.  Saw Dr. Spence for migraine eval? Sx better.  Bladder eval negative.  Dr. Méndez.  She denies bladder sx now.    No recent URI or UTI sx.    She has increased bone and joint pain sx.  Refill meds for pain control.    Smoke 1/3 ppd.  Etoh no.  Job, security worker.    Cholecystectomy +, M0.    Mom  SLE.  1 child cerebral palsy.      ROS:   GEN: normal without any fever, night sweats or weight loss  HEENT:  HA's better  CV: normal with no CP, SOB, PND, SIMON or orthopnea  PULM: See HPI.  GI: GERD.  See history of present illness.  : Pyelonephritis hx. SEE HPI.  BREAST: normal with no mass, discharge, pain  SKIN: normal with no rash, erythema.    Review of patient's allergies indicates:  No Known Allergies    Current Outpatient Medications:     cetirizine (ZYRTEC) 10 MG tablet, , Disp: , Rfl:     clotrimazole (MYCELEX) 10 mg lazaro, , Disp: , Rfl:     DULoxetine (CYMBALTA) 60 MG capsule, Take 60 mg by mouth., Disp: , Rfl:     fluticasone (FLONASE) 50 mcg/actuation nasal spray, , Disp: , Rfl:     hydrocodone-acetaminophen  (VICODIN) 5-500 mg per tablet, , Disp: , Rfl:     hydrocodone-acetaminophen 7.5-325mg (NORCO) 7.5-325 mg per tablet, Take 1 tablet by mouth., Disp: , Rfl:     hydrOXYzine pamoate (VISTARIL) 25 MG Cap, TAKE 1 CAPSULE BY MOUTH 3 TIMES DAILY AS NEEDED FOR ITCHING, Disp: , Rfl:     loratadine (CLARITIN) 10 mg tablet, , Disp: , Rfl:     omeprazole (PRILOSEC) 20 MG capsule, , Disp: , Rfl:     topiramate (TOPAMAX) 100 MG tablet, , Disp: , Rfl:     trazodone (DESYREL) 100 MG tablet, , Disp: , Rfl:           Objective:   Vitals:  Blood pressure 104/73, pulse 77, temperature 97.2 °F (36.2 °C), resp. rate 18, weight 101.1 kg (222 lb 14.4 oz).    Physical Examination:   GEN: no apparent distress, comfortable  HEAD: atraumatic and normocephalic  EYES: no pallor, no icterus  ENT: no pharyngeal erythema, external ears WNL; no nasal discharge  NECK: no masses, thyroid normal, trachea midline, no LAD/LN's, supple  CV: RRR with no murmur; normal pulse; normal S1 and S2; no pedal edema  CHEST: Normal respiratory effort; CTAB;  no wheeze or crackles.  I believe the soft tissue over the sternum is full.  ABDOM: nontender and nondistended; soft;  no rebound/guarding, L/S NP  MUSC/Skeletal: ROM normal; no crepitus; joints normal; no deformities  EXTREM: no clubbing, cyanosis, inflammation   SKIN: no rashes, lesions, ulcers, petechiae  : no CVAT.  NEURO: grossly intact; motor/sensory WNL; no tremors  PSYCH: normal mood, affect and behavior  LYMPH: normal cervical, supraclavicular, axillary and groin LN's      Radiology/Diagnostic Studies:    Mamm 1/2018 negative.  Bone marrow 2014, 25% cellularity.  IgG 2,000 now at 1862.  Hgb 9.5 WBC 4500, plt cnt 598,000.  Polyclonal gammopathy    CAT of chest mottled sternum, 2.2 axillary LN  Assessment:   (1) 44 y.o. female with chronic anemia secondary to decreased bone marrow cellularity.  Blood counts low but stable.      (2)Chronic LBP.  Refill meds.      (3)Hx of pyelonephritis, recurrent  UTIs.   evaluation negative results.    Recent URI sx, treated.    New C/O of sternal pain and fullness.  Mottled appearance to the sternum raises the question of metastatic disease.  She has a polyclonal go myopathy per protein studies.  Will get a 24 hr urine for creatinine clearance, total protein, and Bence-Shah protein.  Check mamm and bone scan for cancer.    May come to LN or bone Bx.  RTC 1 month.    RTC 1 week.

## 2020-09-10 ENCOUNTER — OFFICE VISIT (OUTPATIENT)
Dept: HEMATOLOGY/ONCOLOGY | Facility: CLINIC | Age: 45
End: 2020-09-10
Payer: MEDICAID

## 2020-09-10 VITALS — DIASTOLIC BLOOD PRESSURE: 84 MMHG | TEMPERATURE: 97 F | HEART RATE: 67 BPM | SYSTOLIC BLOOD PRESSURE: 142 MMHG

## 2020-09-10 DIAGNOSIS — D47.2 MONOCLONAL GAMMOPATHY OF UNKNOWN SIGNIFICANCE (MGUS): ICD-10-CM

## 2020-09-10 DIAGNOSIS — D63.8 ANEMIA IN OTHER CHRONIC DISEASES CLASSIFIED ELSEWHERE: Primary | ICD-10-CM

## 2020-09-10 PROCEDURE — 99213 OFFICE O/P EST LOW 20 MIN: CPT | Mod: S$GLB,,, | Performed by: INTERNAL MEDICINE

## 2020-09-10 PROCEDURE — 99213 PR OFFICE/OUTPT VISIT, EST, LEVL III, 20-29 MIN: ICD-10-PCS | Mod: S$GLB,,, | Performed by: INTERNAL MEDICINE

## 2020-09-11 ENCOUNTER — TELEPHONE (OUTPATIENT)
Dept: HEMATOLOGY/ONCOLOGY | Facility: CLINIC | Age: 45
End: 2020-09-11

## 2020-09-11 NOTE — TELEPHONE ENCOUNTER
----- Message from Lexie Wilburn, Patient Care Assistant sent at 9/10/2020  2:16 PM CDT -----  Abhijit Mcrae Scheduling called in stating she would like to speak to someone regarding this patient. She can be reached at [phone]

## 2020-09-12 NOTE — PROGRESS NOTES
[facility] Hematology Oncology In Office Encounter Progress Note  9/10/20    Subjective:      Patient ID:   Robina Potter                                       [address]  44 y.o. female                                           [place] [address]  1975  Dev Strong    Chief Complaint:   Anemia, LBP    HPI:  44 y.o. female with anemia 2nd decreased cellularity of bone marrow.  Chronic low back pain sx +.  Applies heat with relief.  Admits to increased LBP sx, pain in hand and knee joints  with damp, cold weather of last few days.    She c/o sternal pain and fullness.      GERD +.  Pyelonephritis +.  Fibroids hx.  Recurrant UTI.  Cystoscopy done.  Dr. Strong treated her for depression.  She is to follow-up with PMD for hernia.    Seen for chest pain sx, CAT negative for PE.  Dr. Strong.  She has GI sx, on meds per Dr. Strong..  She saw Dr. Strong, may need hernia repair.  Saw Dr. Spence for migraine eval? Sx better.  Bladder eval negative.  Dr. Méndez.  She denies bladder sx now.    No recent URI or UTI sx.    She has increased bone and joint pain sx.  Refill meds for pain control.    Smoke 1/3 ppd.  Etoh no.  Job, security worker.    Cholecystectomy +, M0.    Mom  SLE.  1 child cerebral palsy.      ROS:   GEN: normal without any fever, night sweats or weight loss  HEENT:  HA's better  CV: normal with no CP, SOB, PND, SIMON or orthopnea  PULM: See HPI.  GI: GERD.  See history of present illness.  : Pyelonephritis hx. SEE HPI.  BREAST: normal with no mass, discharge, pain  SKIN: normal with no rash, erythema.    Review of patient's allergies indicates:  No Known Allergies    Current Outpatient Medications:     cetirizine (ZYRTEC) 10 MG tablet, , Disp: , Rfl:     clotrimazole (MYCELEX) 10 mg lazaro, , Disp: , Rfl:     DULoxetine (CYMBALTA) 60 MG capsule, Take 60 mg by mouth., Disp: , Rfl:     fluticasone (FLONASE) 50 mcg/actuation nasal spray, , Disp: , Rfl:     hydrocodone-acetaminophen  (VICODIN) 5-500 mg per tablet, , Disp: , Rfl:     hydrocodone-acetaminophen 7.5-325mg (NORCO) 7.5-325 mg per tablet, Take 1 tablet by mouth., Disp: , Rfl:     hydrOXYzine pamoate (VISTARIL) 25 MG Cap, TAKE 1 CAPSULE BY MOUTH 3 TIMES DAILY AS NEEDED FOR ITCHING, Disp: , Rfl:     loratadine (CLARITIN) 10 mg tablet, , Disp: , Rfl:     omeprazole (PRILOSEC) 20 MG capsule, , Disp: , Rfl:     topiramate (TOPAMAX) 100 MG tablet, , Disp: , Rfl:     trazodone (DESYREL) 100 MG tablet, , Disp: , Rfl:           Objective:   Vitals:  Blood pressure (!) 142/84, pulse 67, temperature 97.1 °F (36.2 °C).    Physical Examination:   GEN: no apparent distress, comfortable  HEAD: atraumatic and normocephalic  EYES: no pallor, no icterus  ENT: no pharyngeal erythema, external ears WNL; no nasal discharge  NECK: no masses, thyroid normal, trachea midline, no LAD/LN's, supple  CV: RRR with no murmur; normal pulse; normal S1 and S2; no pedal edema  CHEST: Normal respiratory effort; CTAB;  no wheeze or crackles.  I believe the soft tissue over the sternum is full.  ABDOM: nontender and nondistended; soft;  no rebound/guarding, L/S NP  MUSC/Skeletal: ROM normal; no crepitus; joints normal; no deformities  EXTREM: no clubbing, cyanosis, inflammation   SKIN: no rashes, lesions, ulcers, petechiae  : no CVAT.  NEURO: grossly intact; motor/sensory WNL; no tremors  PSYCH: normal mood, affect and behavior  LYMPH: normal cervical, supraclavicular, axillary and groin LN's      Radiology/Diagnostic Studies:    Mamm 1/2018 negative.  Bone marrow 2014, 25% cellularity.  IgG 2,000 now at 1862.  Hgb 9.5 WBC 4500, plt cnt 598,000.  Polyclonal gammopathy    CAT of chest mottled sternum, 2.2 axillary LN  Assessment:   (1) 44 y.o. female with chronic anemia secondary to decreased bone marrow cellularity.  Blood counts low but stable.      (2)Chronic LBP.  Refill meds.      (3)Hx of pyelonephritis, recurrent UTIs.   evaluation negative  results.    Recent URI sx, treated.    New C/O of sternal pain and fullness.  Mottled appearance to the sternum raises the question of metastatic disease.  She has a polyclonal gammopathy per protein studies.  Will get a 24 hr urine for creatinine clearance, total protein, and Bence-Shah protein.  Check mamm and bone scan for cancer.  Pending HH.    May come to LN or bone Bx.  RTC 1 month.

## 2020-10-08 ENCOUNTER — OFFICE VISIT (OUTPATIENT)
Dept: HEMATOLOGY/ONCOLOGY | Facility: CLINIC | Age: 45
End: 2020-10-08
Payer: MEDICAID

## 2020-10-08 VITALS
DIASTOLIC BLOOD PRESSURE: 84 MMHG | HEART RATE: 95 BPM | TEMPERATURE: 98 F | WEIGHT: 223 LBS | BODY MASS INDEX: 34.93 KG/M2 | SYSTOLIC BLOOD PRESSURE: 119 MMHG

## 2020-10-08 DIAGNOSIS — R93.7 ABNORMAL BONE XRAY: Primary | ICD-10-CM

## 2020-10-08 PROCEDURE — 99213 PR OFFICE/OUTPT VISIT, EST, LEVL III, 20-29 MIN: ICD-10-PCS | Mod: S$GLB,,, | Performed by: INTERNAL MEDICINE

## 2020-10-08 PROCEDURE — 99213 OFFICE O/P EST LOW 20 MIN: CPT | Mod: S$GLB,,, | Performed by: INTERNAL MEDICINE

## 2020-10-09 NOTE — PROGRESS NOTES
[facility] Hematology Oncology In Office Encounter Progress Note  10/8/20    Subjective:      Patient ID:   Robina Potter                                       [address]  45 y.o. female                                           [place] [address]  1975  Dev Strong    Chief Complaint:   Anemia, LBP    HPI:  45 y.o. female with anemia 2nd decreased cellularity of bone marrow.  Chronic low back pain sx +.  Applies heat with relief.  Admits to increased LBP sx, pain in hand and knee joints  with damp, cold weather of last few days.    She c/o sternal pain and fullness.      GERD +.  Pyelonephritis +.  Fibroids hx.  Recurrant UTI.  Cystoscopy done.  Dr. Strong treated her for depression.  She is to follow-up with PMD for hernia.    Seen for chest pain sx, CAT negative for PE.  Dr. Strong.  She has GI sx, on meds per Dr. Strong..  She saw Dr. Strong, may need hernia repair.  Saw Dr. Spence for migraine eval? Sx better.  Bladder eval negative.  Dr. Méndez.  She denies bladder sx now.    No recent URI or UTI sx.    She has increased bone and joint pain sx.  Refill meds for pain control.    Smoke 1/3 ppd.  Etoh no.  Job, security worker.    Cholecystectomy +, M0.    Mom  SLE.  1 child cerebral palsy.      ROS:   GEN: normal without any fever, night sweats or weight loss  HEENT:  HA's better  CV: normal with no CP, SOB, PND, SIMON or orthopnea  PULM: See HPI.  GI: GERD.  See history of present illness.  : Pyelonephritis hx. SEE HPI.  BREAST: normal with no mass, discharge, pain  SKIN: normal with no rash, erythema.    Review of patient's allergies indicates:  No Known Allergies    Current Outpatient Medications:     cetirizine (ZYRTEC) 10 MG tablet, , Disp: , Rfl:     clotrimazole (MYCELEX) 10 mg lazaro, , Disp: , Rfl:     DULoxetine (CYMBALTA) 60 MG capsule, Take 60 mg by mouth., Disp: , Rfl:     fluticasone (FLONASE) 50 mcg/actuation nasal spray, , Disp: , Rfl:     hydrocodone-acetaminophen  (VICODIN) 5-500 mg per tablet, , Disp: , Rfl:     hydrocodone-acetaminophen 7.5-325mg (NORCO) 7.5-325 mg per tablet, Take 1 tablet by mouth., Disp: , Rfl:     hydrOXYzine pamoate (VISTARIL) 25 MG Cap, TAKE 1 CAPSULE BY MOUTH 3 TIMES DAILY AS NEEDED FOR ITCHING, Disp: , Rfl:     loratadine (CLARITIN) 10 mg tablet, , Disp: , Rfl:     omeprazole (PRILOSEC) 20 MG capsule, , Disp: , Rfl:     topiramate (TOPAMAX) 100 MG tablet, , Disp: , Rfl:     trazodone (DESYREL) 100 MG tablet, , Disp: , Rfl:           Objective:   Vitals:  Blood pressure 119/84, pulse 95, temperature 98.2 °F (36.8 °C), weight 101.2 kg (223 lb).    Physical Examination:   GEN: no apparent distress, comfortable  HEAD: atraumatic and normocephalic  EYES: no pallor, no icterus  ENT: no pharyngeal erythema, external ears WNL; no nasal discharge  NECK: no masses, thyroid normal, trachea midline, no LAD/LN's, supple  CV: RRR with no murmur; normal pulse; normal S1 and S2; no pedal edema  CHEST: Normal respiratory effort; CTAB;  no wheeze or crackles.  I believe the soft tissue over the sternum is full, towards the R chest wall.  ABDOM: nontender and nondistended; soft;  no rebound/guarding, L/S NP  MUSC/Skeletal: ROM normal; no crepitus; joints normal; no deformities  EXTREM: no clubbing, cyanosis, inflammation   SKIN: no rashes.  : no CVAT.  NEURO: grossly intact; motor/sensory WNL; no tremors  PSYCH: normal mood, affect and behavior  LYMPH: normal cervical, supraclavicular, axillary and groin LN's      Radiology/Diagnostic Studies:    Mamm 1/2018 negative.  Bone marrow 2014, 25% cellularity.  IgG 2,000 now at 1862.  Hgb 9.5 WBC 4500, plt cnt 598,000.  Polyclonal gammopathy    CAT of chest mottled sternum, 2.2 axillary LN  Bone Scan multiple gilberto of uptake including sacrum.    Assessment:   (1) 45 y.o. female with chronic anemia secondary to decreased bone marrow cellularity.  Blood counts low but stable.      (2)Chronic LBP.  Fullness at R sternal  area.  Abnormal Bone Scan, possible metastatic dx.    Discussed with radiologist.  Check CAT of chest, abdomin, pelvis to try identify best site to Bx for tissue Dx?  RTC 2-3 weeks.

## 2020-11-19 ENCOUNTER — TELEPHONE (OUTPATIENT)
Dept: HEMATOLOGY/ONCOLOGY | Facility: CLINIC | Age: 45
End: 2020-11-19

## 2020-11-19 ENCOUNTER — OFFICE VISIT (OUTPATIENT)
Dept: HEMATOLOGY/ONCOLOGY | Facility: CLINIC | Age: 45
End: 2020-11-19
Payer: MEDICAID

## 2020-11-19 VITALS
WEIGHT: 219.63 LBS | DIASTOLIC BLOOD PRESSURE: 73 MMHG | BODY MASS INDEX: 34.39 KG/M2 | SYSTOLIC BLOOD PRESSURE: 122 MMHG | TEMPERATURE: 97 F | HEART RATE: 93 BPM

## 2020-11-19 DIAGNOSIS — R59.1 LAD (LYMPHADENOPATHY), GENERALIZED: Primary | ICD-10-CM

## 2020-11-19 PROCEDURE — 99213 OFFICE O/P EST LOW 20 MIN: CPT | Mod: S$GLB,,, | Performed by: INTERNAL MEDICINE

## 2020-11-19 PROCEDURE — 99213 PR OFFICE/OUTPT VISIT, EST, LEVL III, 20-29 MIN: ICD-10-PCS | Mod: S$GLB,,, | Performed by: INTERNAL MEDICINE

## 2020-11-19 NOTE — TELEPHONE ENCOUNTER
I have placed orders in epic for left inguinal lymph node biopsy at [facility] per ultrasound-guided procedure in Radiology.  Try to get this arranged within 2 weeks.  Return to clinic here in 1 month.

## 2020-11-19 NOTE — PROGRESS NOTES
[facility] Hematology Oncology In Office Encounter Progress Note  20    Subjective:      Patient ID:   Robina Potter                                       [address]  45 y.o. female                                           [place] [address]  1975  Dev Strong    Chief Complaint:   Anemia, LBP    HPI:  45 y.o. female with anemia 2nd decreased cellularity of bone marrow.  Chronic low back pain sx +.  Applies heat with relief.  Admits to increased LBP sx, pain in hand and knee joints  with damp, cold weather of last few days.    She c/o sternal pain and fullness.      GERD +.  Pyelonephritis +.  Fibroids hx.  Recurrant UTI.  Cystoscopy done.  Dr. Strong treated her for depression.  She is to follow-up with PMD for hernia.    Seen for chest pain sx, CAT negative for PE.  Dr. Strong.  She has GI sx, on meds per Dr. Strong..  She saw Dr. Strong, may need hernia repair.  Saw Dr. Spence for migraine eval? Sx better.  Bladder eval negative.  Dr. Méndez.  She denies bladder sx now.    No recent URI or UTI sx.    She has increased bone and joint pain sx.  Refill meds for pain control.    Smoke 1/3 ppd.  Etoh no.  Job, security worker.    Cholecystectomy +, M0.    Mom  SLE.  1 child cerebral palsy.      ROS:   GEN: normal without any fever, night sweats or weight loss  HEENT:  HA's better  CV: normal with no CP, SOB, PND, SIMON or orthopnea  PULM: See HPI.  GI: GERD.  See history of present illness.  : Pyelonephritis hx. SEE HPI.  BREAST: normal with no mass, discharge, pain  SKIN: normal with no rash, erythema.    Review of patient's allergies indicates:  No Known Allergies    Current Outpatient Medications:     cetirizine (ZYRTEC) 10 MG tablet, , Disp: , Rfl:     clotrimazole (MYCELEX) 10 mg lazaro, , Disp: , Rfl:     DULoxetine (CYMBALTA) 60 MG capsule, Take 60 mg by mouth., Disp: , Rfl:     fluticasone (FLONASE) 50 mcg/actuation nasal spray, , Disp: , Rfl:     hydrocodone-acetaminophen  (VICODIN) 5-500 mg per tablet, , Disp: , Rfl:     hydrocodone-acetaminophen 7.5-325mg (NORCO) 7.5-325 mg per tablet, Take 1 tablet by mouth., Disp: , Rfl:     hydrOXYzine pamoate (VISTARIL) 25 MG Cap, TAKE 1 CAPSULE BY MOUTH 3 TIMES DAILY AS NEEDED FOR ITCHING, Disp: , Rfl:     loratadine (CLARITIN) 10 mg tablet, , Disp: , Rfl:     omeprazole (PRILOSEC) 20 MG capsule, , Disp: , Rfl:     topiramate (TOPAMAX) 100 MG tablet, , Disp: , Rfl:     trazodone (DESYREL) 100 MG tablet, , Disp: , Rfl:           Objective:   Vitals:  Blood pressure 122/73, pulse 93, temperature 97.2 °F (36.2 °C), weight 99.6 kg (219 lb 9.6 oz).    Physical Examination:   GEN: no apparent distress, comfortable  HEAD: atraumatic and normocephalic  EYES: no pallor, no icterus  ENT: no pharyngeal erythema, external ears WNL; no nasal discharge  NECK: no masses, thyroid normal, trachea midline, no LAD/LN's, supple  CV: RRR with no murmur; normal pulse; normal S1 and S2; no pedal edema  CHEST: Normal respiratory effort; CTAB;  no wheeze or crackles.  I believe the soft tissue over the sternum is full, towards the R chest wall.  ABDOM: nontender and nondistended; soft;  no rebound/guarding, L/S NP  MUSC/Skeletal: ROM normal; no crepitus; joints normal; no deformities  EXTREM: no clubbing, cyanosis, inflammation   SKIN: no rashes.  : no CVAT.  NEURO: grossly intact; motor/sensory WNL; no tremors  PSYCH: normal mood, affect and behavior  LYMPH: normal cervical, supraclavicular, axillary and groin LN's      Radiology/Diagnostic Studies:    Mamm 1/2018 negative.  Bone marrow 2014, 25% cellularity.  IgG 2,000 now at 1862.  Hgb 9.5 WBC 4500, plt cnt 598,000.  Polyclonal gammopathy    CAT of chest mottled sternum, 2.2 axillary LN  Bone Scan multiple gilberto of uptake including sacrum.  CAT diffuse LN, sclerotic bone lesions  Mamm YONATHAN    Assessment:   (1) 45 y.o. female with chronic anemia secondary to decreased bone marrow cellularity.  Blood counts low  but stable.      (2)Chronic LBP.  Fullness at R sternal area.  Abnormal Bone Scan, possible metastatic dx.    Discussed with radiologist.  Bx of L iliac LN ordered.    RTC 1 month.

## 2020-11-30 ENCOUNTER — TELEPHONE (OUTPATIENT)
Dept: HEMATOLOGY/ONCOLOGY | Facility: CLINIC | Age: 45
End: 2020-11-30

## 2020-12-17 ENCOUNTER — OFFICE VISIT (OUTPATIENT)
Dept: HEMATOLOGY/ONCOLOGY | Facility: CLINIC | Age: 45
End: 2020-12-17
Payer: MEDICAID

## 2020-12-17 VITALS
TEMPERATURE: 98 F | BODY MASS INDEX: 35.43 KG/M2 | HEART RATE: 79 BPM | DIASTOLIC BLOOD PRESSURE: 80 MMHG | SYSTOLIC BLOOD PRESSURE: 124 MMHG | WEIGHT: 226.19 LBS

## 2020-12-17 DIAGNOSIS — J01.10 ACUTE NON-RECURRENT FRONTAL SINUSITIS: Primary | ICD-10-CM

## 2020-12-17 PROCEDURE — 99213 PR OFFICE/OUTPT VISIT, EST, LEVL III, 20-29 MIN: ICD-10-PCS | Mod: S$GLB,,, | Performed by: INTERNAL MEDICINE

## 2020-12-17 PROCEDURE — 99213 OFFICE O/P EST LOW 20 MIN: CPT | Mod: S$GLB,,, | Performed by: INTERNAL MEDICINE

## 2020-12-17 RX ORDER — AZITHROMYCIN 250 MG/1
TABLET, FILM COATED ORAL
Qty: 6 TABLET | Refills: 0 | Status: SHIPPED | OUTPATIENT
Start: 2020-12-17 | End: 2020-12-22

## 2020-12-17 NOTE — PROGRESS NOTES
[facility] Hematology Oncology In Office Encounter Progress Note  20    Subjective:      Patient ID:   Robina Potter                                       [address]  45 y.o. female                                           [place] [address]  1975  Dev Strong    Chief Complaint:   Anemia, LBP    HPI:  45 y.o. female with anemia 2nd decreased cellularity of bone marrow.  Chronic low back pain sx +.  Applies heat with relief.  Admits to increased LBP sx, pain in hand and knee joints  with damp, cold weather of last few days.    She c/o sternal pain and fullness.  Complains of sinus symptoms, postnasal drip, nonproductive cough.  Have given her a prescription for Zithromax x5 days.    Biopsy was scheduled for , she canceled and rescheduled for  at [facility].  Have refilled her pain medicines for the next month for her.  She took here in 2 or 3 weeks to review biopsy results.      GERD +.  Pyelonephritis +.  Fibroids hx.  Recurrant UTI.  Cystoscopy done.  Dr. Strong treated her for depression.  She is to follow-up with PMD for hernia.    Seen for chest pain sx, CAT negative for PE.  Dr. Strong.  She has GI sx, on meds per Dr. Strong..  She saw Dr. Strong, may need hernia repair.  Saw Dr. Spence for migraine eval? Sx better.  Bladder eval negative.  Dr. Méndez.  She denies bladder sx now.    No recent URI or UTI sx.    She has increased bone and joint pain sx.  Refill meds for pain control.    Smoke 1/3 ppd.  Etoh no.  Job, security worker.    Cholecystectomy +, M0.    Mom  SLE.  1 child cerebral palsy.      ROS:   GEN: normal without any fever, night sweats or weight loss  HEENT:  HA's better  CV: normal with no CP, SOB, PND, SIMON or orthopnea  PULM: See HPI.  GI: GERD.  See history of present illness.  : Pyelonephritis hx. SEE HPI.  BREAST: normal with no mass, discharge, pain  SKIN: normal with no rash, erythema.    Review of patient's allergies  indicates:  No Known Allergies    Current Outpatient Medications:     azithromycin (Z-DALLAS) 250 MG tablet, Take 2 tablets by mouth on day 1; Take 1 tablet by mouth on days 2-5, Disp: 6 tablet, Rfl: 0    cetirizine (ZYRTEC) 10 MG tablet, , Disp: , Rfl:     clotrimazole (MYCELEX) 10 mg lazaro, , Disp: , Rfl:     DULoxetine (CYMBALTA) 60 MG capsule, Take 60 mg by mouth., Disp: , Rfl:     fluticasone (FLONASE) 50 mcg/actuation nasal spray, , Disp: , Rfl:     hydrocodone-acetaminophen (VICODIN) 5-500 mg per tablet, , Disp: , Rfl:     hydrocodone-acetaminophen 7.5-325mg (NORCO) 7.5-325 mg per tablet, Take 1 tablet by mouth., Disp: , Rfl:     hydrOXYzine pamoate (VISTARIL) 25 MG Cap, TAKE 1 CAPSULE BY MOUTH 3 TIMES DAILY AS NEEDED FOR ITCHING, Disp: , Rfl:     loratadine (CLARITIN) 10 mg tablet, , Disp: , Rfl:     omeprazole (PRILOSEC) 20 MG capsule, , Disp: , Rfl:     topiramate (TOPAMAX) 100 MG tablet, , Disp: , Rfl:     trazodone (DESYREL) 100 MG tablet, , Disp: , Rfl:           Objective:   Vitals:  Blood pressure 124/80, pulse 79, temperature 97.9 °F (36.6 °C), weight 102.6 kg (226 lb 3.2 oz).    Physical Examination:   GEN: no apparent distress, comfortable  HEAD: atraumatic and normocephalic  EYES: no pallor, no icterus  ENT: no pharyngeal erythema, external ears WNL; no nasal discharge  NECK: no masses, thyroid normal, trachea midline, no LAD/LN's, supple  CV: RRR with no murmur; normal pulse; normal S1 and S2; no pedal edema  CHEST: Normal respiratory effort; CTAB;  no wheeze or crackles.  I believe the soft tissue over the sternum is full, towards the R chest wall.  ABDOM: nontender and nondistended; soft;  no rebound/guarding, L/S NP  MUSC/Skeletal: ROM normal; no crepitus; joints normal; no deformities  EXTREM: no clubbing, cyanosis, inflammation   SKIN: no rashes.  : no CVAT.  NEURO: grossly intact; motor/sensory WNL; no tremors  PSYCH: normal mood, affect and behavior  LYMPH: normal cervical,  supraclavicular, axillary and groin LN's      Radiology/Diagnostic Studies:    Mamm 1/2018 negative.  Bone marrow 2014, 25% cellularity.  IgG 2,000 now at 1862.  Hgb 9.5 WBC 4500, plt cnt 598,000.  Polyclonal gammopathy    CAT of chest mottled sternum, 2.2 axillary LN  Bone Scan multiple gilberto of uptake including sacrum.  CAT diffuse LN, sclerotic bone lesions  Mamm YONATHAN    Assessment:   (1) 45 y.o. female with chronic anemia secondary to decreased bone marrow cellularity.  Blood counts low but stable.      (2)Chronic LBP.  Fullness at R sternal area.  Abnormal Bone Scan, possible metastatic dx.    Discussed with radiologist.  Bx of L iliac LN ordered.  Rescheduled for December 22, 2020 at 9:00 a.m. at [facility].    RTC 3 weeks.

## 2021-01-07 ENCOUNTER — TELEPHONE (OUTPATIENT)
Dept: HEMATOLOGY/ONCOLOGY | Facility: CLINIC | Age: 46
End: 2021-01-07

## 2021-01-21 ENCOUNTER — OFFICE VISIT (OUTPATIENT)
Dept: HEMATOLOGY/ONCOLOGY | Facility: CLINIC | Age: 46
End: 2021-01-21
Payer: MEDICAID

## 2021-01-21 VITALS — BODY MASS INDEX: 34.14 KG/M2 | TEMPERATURE: 98 F | WEIGHT: 218 LBS

## 2021-01-21 DIAGNOSIS — R59.0 LYMPHADENOPATHY, INGUINAL: Primary | ICD-10-CM

## 2021-01-21 PROCEDURE — 99213 PR OFFICE/OUTPT VISIT, EST, LEVL III, 20-29 MIN: ICD-10-PCS | Mod: S$GLB,,, | Performed by: INTERNAL MEDICINE

## 2021-01-21 PROCEDURE — 99213 OFFICE O/P EST LOW 20 MIN: CPT | Mod: S$GLB,,, | Performed by: INTERNAL MEDICINE

## 2021-02-25 ENCOUNTER — TELEPHONE (OUTPATIENT)
Dept: HEMATOLOGY/ONCOLOGY | Facility: CLINIC | Age: 46
End: 2021-02-25

## 2021-02-25 ENCOUNTER — OFFICE VISIT (OUTPATIENT)
Dept: HEMATOLOGY/ONCOLOGY | Facility: CLINIC | Age: 46
End: 2021-02-25
Payer: MEDICAID

## 2021-02-25 VITALS
DIASTOLIC BLOOD PRESSURE: 73 MMHG | WEIGHT: 214 LBS | HEART RATE: 78 BPM | SYSTOLIC BLOOD PRESSURE: 122 MMHG | BODY MASS INDEX: 33.52 KG/M2 | TEMPERATURE: 97 F

## 2021-02-25 DIAGNOSIS — R93.7 ABNORMAL BONE XRAY: ICD-10-CM

## 2021-02-25 DIAGNOSIS — D63.8 ANEMIA IN OTHER CHRONIC DISEASES CLASSIFIED ELSEWHERE: ICD-10-CM

## 2021-02-25 DIAGNOSIS — R59.0 LYMPHADENOPATHY, INGUINAL: Primary | ICD-10-CM

## 2021-02-25 PROCEDURE — 99214 OFFICE O/P EST MOD 30 MIN: CPT | Mod: S$GLB,,, | Performed by: INTERNAL MEDICINE

## 2021-02-25 PROCEDURE — 99214 PR OFFICE/OUTPT VISIT, EST, LEVL IV, 30-39 MIN: ICD-10-PCS | Mod: S$GLB,,, | Performed by: INTERNAL MEDICINE

## 2021-02-28 ENCOUNTER — TELEPHONE (OUTPATIENT)
Dept: HEMATOLOGY/ONCOLOGY | Facility: CLINIC | Age: 46
End: 2021-02-28

## 2021-03-02 ENCOUNTER — TELEPHONE (OUTPATIENT)
Dept: HEMATOLOGY/ONCOLOGY | Facility: CLINIC | Age: 46
End: 2021-03-02

## 2021-03-02 DIAGNOSIS — R59.0 LYMPHADENOPATHY, INGUINAL: Primary | ICD-10-CM

## 2021-03-10 ENCOUNTER — TELEPHONE (OUTPATIENT)
Dept: HEMATOLOGY/ONCOLOGY | Facility: CLINIC | Age: 46
End: 2021-03-10

## 2021-03-25 ENCOUNTER — OFFICE VISIT (OUTPATIENT)
Dept: HEMATOLOGY/ONCOLOGY | Facility: CLINIC | Age: 46
End: 2021-03-25
Payer: MEDICAID

## 2021-03-25 VITALS
HEART RATE: 70 BPM | BODY MASS INDEX: 34.3 KG/M2 | SYSTOLIC BLOOD PRESSURE: 118 MMHG | WEIGHT: 219 LBS | DIASTOLIC BLOOD PRESSURE: 77 MMHG | TEMPERATURE: 98 F

## 2021-03-25 DIAGNOSIS — R59.0 LYMPHADENOPATHY, INGUINAL: Primary | ICD-10-CM

## 2021-03-25 DIAGNOSIS — D63.8 ANEMIA IN OTHER CHRONIC DISEASES CLASSIFIED ELSEWHERE: ICD-10-CM

## 2021-03-25 PROCEDURE — 99213 PR OFFICE/OUTPT VISIT, EST, LEVL III, 20-29 MIN: ICD-10-PCS | Mod: S$GLB,,, | Performed by: INTERNAL MEDICINE

## 2021-03-25 PROCEDURE — 99213 OFFICE O/P EST LOW 20 MIN: CPT | Mod: S$GLB,,, | Performed by: INTERNAL MEDICINE

## 2021-04-01 ENCOUNTER — TELEPHONE (OUTPATIENT)
Dept: HEMATOLOGY/ONCOLOGY | Facility: CLINIC | Age: 46
End: 2021-04-01

## 2021-04-05 ENCOUNTER — TELEPHONE (OUTPATIENT)
Dept: HEMATOLOGY/ONCOLOGY | Facility: CLINIC | Age: 46
End: 2021-04-05

## 2021-04-21 ENCOUNTER — TELEPHONE (OUTPATIENT)
Dept: HEMATOLOGY/ONCOLOGY | Facility: CLINIC | Age: 46
End: 2021-04-21

## 2021-05-27 ENCOUNTER — OFFICE VISIT (OUTPATIENT)
Dept: HEMATOLOGY/ONCOLOGY | Facility: CLINIC | Age: 46
End: 2021-05-27
Payer: MEDICAID

## 2021-05-27 VITALS
HEART RATE: 83 BPM | BODY MASS INDEX: 32.89 KG/M2 | WEIGHT: 210 LBS | TEMPERATURE: 98 F | DIASTOLIC BLOOD PRESSURE: 67 MMHG | SYSTOLIC BLOOD PRESSURE: 101 MMHG

## 2021-05-27 DIAGNOSIS — D63.8 ANEMIA IN OTHER CHRONIC DISEASES CLASSIFIED ELSEWHERE: ICD-10-CM

## 2021-05-27 DIAGNOSIS — R93.7 ABNORMAL BONE XRAY: ICD-10-CM

## 2021-05-27 DIAGNOSIS — R59.0 LYMPHADENOPATHY, INGUINAL: Primary | ICD-10-CM

## 2021-05-27 PROCEDURE — 99213 OFFICE O/P EST LOW 20 MIN: CPT | Mod: S$GLB,,, | Performed by: INTERNAL MEDICINE

## 2021-05-27 PROCEDURE — 99213 PR OFFICE/OUTPT VISIT, EST, LEVL III, 20-29 MIN: ICD-10-PCS | Mod: S$GLB,,, | Performed by: INTERNAL MEDICINE

## 2021-06-24 ENCOUNTER — OFFICE VISIT (OUTPATIENT)
Dept: HEMATOLOGY/ONCOLOGY | Facility: CLINIC | Age: 46
End: 2021-06-24
Payer: MEDICAID

## 2021-06-24 VITALS
BODY MASS INDEX: 33.36 KG/M2 | DIASTOLIC BLOOD PRESSURE: 77 MMHG | WEIGHT: 213 LBS | SYSTOLIC BLOOD PRESSURE: 117 MMHG | TEMPERATURE: 97 F | HEART RATE: 74 BPM

## 2021-06-24 DIAGNOSIS — D47.2 MGUS (MONOCLONAL GAMMOPATHY OF UNKNOWN SIGNIFICANCE): ICD-10-CM

## 2021-06-24 DIAGNOSIS — R59.0 LYMPHADENOPATHY, INGUINAL: ICD-10-CM

## 2021-06-24 DIAGNOSIS — D63.8 ANEMIA IN OTHER CHRONIC DISEASES CLASSIFIED ELSEWHERE: Primary | ICD-10-CM

## 2021-06-24 DIAGNOSIS — R93.7 ABNORMAL BONE XRAY: ICD-10-CM

## 2021-06-24 PROCEDURE — 99213 OFFICE O/P EST LOW 20 MIN: CPT | Mod: S$GLB,,, | Performed by: INTERNAL MEDICINE

## 2021-06-24 PROCEDURE — 99213 PR OFFICE/OUTPT VISIT, EST, LEVL III, 20-29 MIN: ICD-10-PCS | Mod: S$GLB,,, | Performed by: INTERNAL MEDICINE

## 2021-07-20 ENCOUNTER — OFFICE VISIT (OUTPATIENT)
Dept: HEMATOLOGY/ONCOLOGY | Facility: CLINIC | Age: 46
End: 2021-07-20
Payer: MEDICAID

## 2021-07-20 VITALS
BODY MASS INDEX: 33.03 KG/M2 | HEART RATE: 76 BPM | WEIGHT: 210.88 LBS | DIASTOLIC BLOOD PRESSURE: 74 MMHG | RESPIRATION RATE: 18 BRPM | TEMPERATURE: 99 F | SYSTOLIC BLOOD PRESSURE: 110 MMHG

## 2021-07-20 DIAGNOSIS — M54.50 CHRONIC MIDLINE LOW BACK PAIN WITHOUT SCIATICA: ICD-10-CM

## 2021-07-20 DIAGNOSIS — D63.8 ANEMIA IN OTHER CHRONIC DISEASES CLASSIFIED ELSEWHERE: Primary | ICD-10-CM

## 2021-07-20 DIAGNOSIS — R93.7 ABNORMAL BONE XRAY: ICD-10-CM

## 2021-07-20 DIAGNOSIS — G89.29 CHRONIC MIDLINE LOW BACK PAIN WITHOUT SCIATICA: ICD-10-CM

## 2021-07-20 DIAGNOSIS — R59.0 LYMPHADENOPATHY, INGUINAL: ICD-10-CM

## 2021-07-20 PROCEDURE — 99213 OFFICE O/P EST LOW 20 MIN: CPT | Mod: S$GLB,,, | Performed by: INTERNAL MEDICINE

## 2021-07-20 PROCEDURE — 99213 PR OFFICE/OUTPT VISIT, EST, LEVL III, 20-29 MIN: ICD-10-PCS | Mod: S$GLB,,, | Performed by: INTERNAL MEDICINE

## 2021-08-24 ENCOUNTER — OFFICE VISIT (OUTPATIENT)
Dept: HEMATOLOGY/ONCOLOGY | Facility: CLINIC | Age: 46
End: 2021-08-24
Payer: MEDICAID

## 2021-08-24 VITALS
SYSTOLIC BLOOD PRESSURE: 102 MMHG | BODY MASS INDEX: 31.67 KG/M2 | HEIGHT: 68 IN | WEIGHT: 209 LBS | DIASTOLIC BLOOD PRESSURE: 70 MMHG | HEART RATE: 82 BPM | RESPIRATION RATE: 16 BRPM

## 2021-08-24 DIAGNOSIS — D63.8 ANEMIA IN OTHER CHRONIC DISEASES CLASSIFIED ELSEWHERE: Primary | ICD-10-CM

## 2021-08-24 DIAGNOSIS — R59.0 LYMPHADENOPATHY, INGUINAL: ICD-10-CM

## 2021-08-24 PROCEDURE — 99213 OFFICE O/P EST LOW 20 MIN: CPT | Mod: S$GLB,,, | Performed by: INTERNAL MEDICINE

## 2021-08-24 PROCEDURE — 99213 PR OFFICE/OUTPT VISIT, EST, LEVL III, 20-29 MIN: ICD-10-PCS | Mod: S$GLB,,, | Performed by: INTERNAL MEDICINE

## 2021-09-14 ENCOUNTER — TELEPHONE (OUTPATIENT)
Dept: PULMONOLOGY | Facility: CLINIC | Age: 46
End: 2021-09-14

## 2021-10-05 ENCOUNTER — OFFICE VISIT (OUTPATIENT)
Dept: HEMATOLOGY/ONCOLOGY | Facility: CLINIC | Age: 46
End: 2021-10-05
Payer: MEDICAID

## 2021-10-05 VITALS
SYSTOLIC BLOOD PRESSURE: 105 MMHG | RESPIRATION RATE: 18 BRPM | BODY MASS INDEX: 31.67 KG/M2 | HEIGHT: 68 IN | WEIGHT: 209 LBS | DIASTOLIC BLOOD PRESSURE: 66 MMHG | HEART RATE: 83 BPM

## 2021-10-05 DIAGNOSIS — R59.0 LYMPHADENOPATHY, INGUINAL: ICD-10-CM

## 2021-10-05 DIAGNOSIS — D47.2 MONOCLONAL GAMMOPATHY OF UNKNOWN SIGNIFICANCE (MGUS): ICD-10-CM

## 2021-10-05 DIAGNOSIS — D63.8 ANEMIA IN OTHER CHRONIC DISEASES CLASSIFIED ELSEWHERE: Primary | ICD-10-CM

## 2021-10-05 PROCEDURE — 99213 OFFICE O/P EST LOW 20 MIN: CPT | Mod: S$GLB,,, | Performed by: INTERNAL MEDICINE

## 2021-10-05 PROCEDURE — 99213 PR OFFICE/OUTPT VISIT, EST, LEVL III, 20-29 MIN: ICD-10-PCS | Mod: S$GLB,,, | Performed by: INTERNAL MEDICINE

## 2021-11-04 ENCOUNTER — OFFICE VISIT (OUTPATIENT)
Dept: HEMATOLOGY/ONCOLOGY | Facility: CLINIC | Age: 46
End: 2021-11-04
Payer: MEDICAID

## 2021-11-04 VITALS
DIASTOLIC BLOOD PRESSURE: 63 MMHG | RESPIRATION RATE: 16 BRPM | HEIGHT: 68 IN | BODY MASS INDEX: 31.07 KG/M2 | SYSTOLIC BLOOD PRESSURE: 98 MMHG | WEIGHT: 205 LBS | HEART RATE: 83 BPM

## 2021-11-04 DIAGNOSIS — J40 BRONCHITIS: Primary | ICD-10-CM

## 2021-11-04 PROCEDURE — 99213 PR OFFICE/OUTPT VISIT, EST, LEVL III, 20-29 MIN: ICD-10-PCS | Mod: S$GLB,,, | Performed by: INTERNAL MEDICINE

## 2021-11-04 PROCEDURE — 99213 OFFICE O/P EST LOW 20 MIN: CPT | Mod: S$GLB,,, | Performed by: INTERNAL MEDICINE

## 2021-11-04 RX ORDER — LEVOFLOXACIN 500 MG/1
500 TABLET, FILM COATED ORAL DAILY
Qty: 10 TABLET | Refills: 0 | Status: SHIPPED | OUTPATIENT
Start: 2021-11-04 | End: 2021-11-14

## 2021-12-09 ENCOUNTER — TELEPHONE (OUTPATIENT)
Dept: HEMATOLOGY/ONCOLOGY | Facility: CLINIC | Age: 46
End: 2021-12-09
Payer: MEDICAID

## 2022-01-01 ENCOUNTER — HOSPITAL ENCOUNTER (EMERGENCY)
Facility: HOSPITAL | Age: 47
Discharge: HOME | End: 2022-01-01
Attending: EMERGENCY MEDICINE
Payer: MEDICAID

## 2022-01-01 VITALS
WEIGHT: 210 LBS | HEART RATE: 65 BPM | DIASTOLIC BLOOD PRESSURE: 68 MMHG | HEIGHT: 68 IN | OXYGEN SATURATION: 99 % | RESPIRATION RATE: 15 BRPM | SYSTOLIC BLOOD PRESSURE: 110 MMHG | TEMPERATURE: 98 F | BODY MASS INDEX: 31.83 KG/M2

## 2022-01-01 DIAGNOSIS — R07.9 CHEST PAIN: Primary | ICD-10-CM

## 2022-01-01 DIAGNOSIS — N39.0 URINARY TRACT INFECTION WITHOUT HEMATURIA, SITE UNSPECIFIED: ICD-10-CM

## 2022-01-01 LAB
ALBUMIN SERPL BCP-MCNC: 4 G/DL (ref 3.5–5.2)
ALP SERPL-CCNC: 79 U/L (ref 55–135)
ALT SERPL W/O P-5'-P-CCNC: 14 U/L (ref 10–44)
ANION GAP SERPL CALC-SCNC: 9 MMOL/L (ref 8–16)
AST SERPL-CCNC: 25 U/L (ref 10–40)
B-HCG UR QL: NEGATIVE
BACTERIA #/AREA URNS HPF: NEGATIVE /HPF
BASOPHILS # BLD AUTO: 0.03 K/UL (ref 0–0.2)
BASOPHILS NFR BLD: 0.7 % (ref 0–1.9)
BILIRUB SERPL-MCNC: 0.6 MG/DL (ref 0.1–1)
BILIRUB UR QL STRIP: NEGATIVE
BUN SERPL-MCNC: 20 MG/DL (ref 6–20)
CALCIUM SERPL-MCNC: 8.9 MG/DL (ref 8.7–10.5)
CHLORIDE SERPL-SCNC: 99 MMOL/L (ref 95–110)
CLARITY UR: CLEAR
CLUE CELLS VAG QL WET PREP: NORMAL
CO2 SERPL-SCNC: 26 MMOL/L (ref 23–29)
COLOR UR: YELLOW
CREAT SERPL-MCNC: 1.3 MG/DL (ref 0.5–1.4)
CTP QC/QA: YES
DIFFERENTIAL METHOD: ABNORMAL
EOSINOPHIL # BLD AUTO: 0.2 K/UL (ref 0–0.5)
EOSINOPHIL NFR BLD: 4.3 % (ref 0–8)
ERYTHROCYTE [DISTWIDTH] IN BLOOD BY AUTOMATED COUNT: 13.5 % (ref 11.5–14.5)
EST. GFR  (AFRICAN AMERICAN): 56.5 ML/MIN/1.73 M^2
EST. GFR  (NON AFRICAN AMERICAN): 49 ML/MIN/1.73 M^2
GLUCOSE SERPL-MCNC: 83 MG/DL (ref 70–110)
GLUCOSE UR QL STRIP: NEGATIVE
HCT VFR BLD AUTO: 28.7 % (ref 37–48.5)
HGB BLD-MCNC: 9 G/DL (ref 12–16)
HGB UR QL STRIP: ABNORMAL
HYALINE CASTS #/AREA URNS LPF: 1 /LPF
IMM GRANULOCYTES # BLD AUTO: 0.02 K/UL (ref 0–0.04)
IMM GRANULOCYTES NFR BLD AUTO: 0.5 % (ref 0–0.5)
KETONES UR QL STRIP: NEGATIVE
LEUKOCYTE ESTERASE UR QL STRIP: ABNORMAL
LYMPHOCYTES # BLD AUTO: 1.2 K/UL (ref 1–4.8)
LYMPHOCYTES NFR BLD: 28.7 % (ref 18–48)
MCH RBC QN AUTO: 29.1 PG (ref 27–31)
MCHC RBC AUTO-ENTMCNC: 31.4 G/DL (ref 32–36)
MCV RBC AUTO: 93 FL (ref 82–98)
MICROSCOPIC COMMENT: ABNORMAL
MONOCYTES # BLD AUTO: 0.5 K/UL (ref 0.3–1)
MONOCYTES NFR BLD: 10.9 % (ref 4–15)
NEUTROPHILS # BLD AUTO: 2.3 K/UL (ref 1.8–7.7)
NEUTROPHILS NFR BLD: 54.9 % (ref 38–73)
NITRITE UR QL STRIP: NEGATIVE
NRBC BLD-RTO: 0 /100 WBC
PH UR STRIP: 6 [PH] (ref 5–8)
PLATELET # BLD AUTO: 402 K/UL (ref 150–450)
PMV BLD AUTO: 8.6 FL (ref 9.2–12.9)
POTASSIUM SERPL-SCNC: 4.3 MMOL/L (ref 3.5–5.1)
PROT SERPL-MCNC: 8.6 G/DL (ref 6–8.4)
PROT UR QL STRIP: NEGATIVE
RBC # BLD AUTO: 3.09 M/UL (ref 4–5.4)
RBC #/AREA URNS HPF: 11 /HPF (ref 0–4)
SARS-COV-2 RDRP RESP QL NAA+PROBE: NEGATIVE
SODIUM SERPL-SCNC: 134 MMOL/L (ref 136–145)
SP GR UR STRIP: 1.01 (ref 1–1.03)
SPECIMEN SOURCE: NORMAL
SQUAMOUS #/AREA URNS HPF: 1 /HPF
T VAGINALIS GENITAL QL WET PREP: NORMAL
TROPONIN I SERPL DL<=0.01 NG/ML-MCNC: <0.03 NG/ML
URN SPEC COLLECT METH UR: ABNORMAL
UROBILINOGEN UR STRIP-ACNC: NEGATIVE EU/DL
WBC # BLD AUTO: 4.14 K/UL (ref 3.9–12.7)
WBC #/AREA URNS HPF: 2 /HPF (ref 0–5)
YEAST GENITAL QL WET PREP: NORMAL

## 2022-01-01 PROCEDURE — 93005 ELECTROCARDIOGRAM TRACING: CPT | Performed by: INTERNAL MEDICINE

## 2022-01-01 PROCEDURE — 93010 ELECTROCARDIOGRAM REPORT: CPT | Mod: ,,, | Performed by: INTERNAL MEDICINE

## 2022-01-01 PROCEDURE — 99284 EMERGENCY DEPT VISIT MOD MDM: CPT | Mod: 25

## 2022-01-01 PROCEDURE — 87591 N.GONORRHOEAE DNA AMP PROB: CPT | Performed by: EMERGENCY MEDICINE

## 2022-01-01 PROCEDURE — 81001 URINALYSIS AUTO W/SCOPE: CPT | Performed by: NURSE PRACTITIONER

## 2022-01-01 PROCEDURE — 87491 CHLMYD TRACH DNA AMP PROBE: CPT | Performed by: EMERGENCY MEDICINE

## 2022-01-01 PROCEDURE — 80053 COMPREHEN METABOLIC PANEL: CPT | Performed by: NURSE PRACTITIONER

## 2022-01-01 PROCEDURE — 81025 URINE PREGNANCY TEST: CPT | Performed by: NURSE PRACTITIONER

## 2022-01-01 PROCEDURE — 84484 ASSAY OF TROPONIN QUANT: CPT | Performed by: NURSE PRACTITIONER

## 2022-01-01 PROCEDURE — 93010 EKG 12-LEAD: ICD-10-PCS | Mod: 76,,, | Performed by: INTERNAL MEDICINE

## 2022-01-01 PROCEDURE — 25000003 PHARM REV CODE 250: Performed by: EMERGENCY MEDICINE

## 2022-01-01 PROCEDURE — 87210 SMEAR WET MOUNT SALINE/INK: CPT | Performed by: EMERGENCY MEDICINE

## 2022-01-01 PROCEDURE — U0002 COVID-19 LAB TEST NON-CDC: HCPCS | Performed by: EMERGENCY MEDICINE

## 2022-01-01 PROCEDURE — 87081 CULTURE SCREEN ONLY: CPT | Performed by: EMERGENCY MEDICINE

## 2022-01-01 PROCEDURE — 87205 SMEAR GRAM STAIN: CPT | Performed by: EMERGENCY MEDICINE

## 2022-01-01 PROCEDURE — 85025 COMPLETE CBC W/AUTO DIFF WBC: CPT | Performed by: NURSE PRACTITIONER

## 2022-01-01 RX ORDER — NAPROXEN 500 MG/1
500 TABLET ORAL 2 TIMES DAILY PRN
Qty: 20 TABLET | Refills: 0 | Status: SHIPPED | OUTPATIENT
Start: 2022-01-01 | End: 2022-07-07

## 2022-01-01 RX ORDER — CEFTRIAXONE 1 G/1
1 INJECTION, POWDER, FOR SOLUTION INTRAMUSCULAR; INTRAVENOUS
COMMUNITY
End: 2022-03-31

## 2022-01-01 RX ORDER — NAPROXEN 250 MG/1
500 TABLET ORAL
Status: COMPLETED | OUTPATIENT
Start: 2022-01-01 | End: 2022-01-01

## 2022-01-01 RX ORDER — NITROFURANTOIN 25; 75 MG/1; MG/1
100 CAPSULE ORAL 2 TIMES DAILY
Qty: 10 CAPSULE | Refills: 0 | Status: SHIPPED | OUTPATIENT
Start: 2022-01-01 | End: 2022-01-06

## 2022-01-01 RX ADMIN — NAPROXEN 500 MG: 250 TABLET ORAL at 04:01

## 2022-01-01 NOTE — ED PROVIDER NOTES
Encounter Date: 1/1/2022       History     Chief Complaint   Patient presents with    Chest Pain    Vaginal Discharge     46-year-old female presents complaining of dysuria, patient reports that she feels like she has a bladder infection patient also reports some vaginal discharge but is unsure if this is worse than usual.  Patient also complains of chest pain but reports that chest pain is chronic.  Patient reports she has been having this same pain for years and it has not increased.  Patient localizes pain to the sternal region patient reports tenderness to the sternum if palpated patient has no other acute complaints at this time.        Review of patient's allergies indicates:   Allergen Reactions    Morphine Itching     Past Medical History:   Diagnosis Date    Anemia     Back pain without sciatica      Past Surgical History:   Procedure Laterality Date    CHOLECYSTECTOMY       No family history on file.  Social History     Tobacco Use    Smoking status: Current Every Day Smoker    Smokeless tobacco: Never Used   Substance Use Topics    Alcohol use: No    Drug use: No     Review of Systems   Constitutional: Negative for fever.   HENT: Negative for congestion, rhinorrhea, sore throat and trouble swallowing.    Eyes: Negative for visual disturbance.   Respiratory: Negative for cough, chest tightness, shortness of breath and wheezing.    Cardiovascular: Positive for chest pain. Negative for palpitations and leg swelling.   Gastrointestinal: Negative for abdominal distention, abdominal pain, constipation, diarrhea, nausea and vomiting.   Genitourinary: Positive for dysuria and vaginal discharge. Negative for difficulty urinating, flank pain and frequency.   Musculoskeletal: Negative for arthralgias, back pain, joint swelling and neck pain.   Skin: Negative for color change and rash.   Neurological: Negative for dizziness, syncope, speech difficulty, weakness, numbness and headaches.   All other systems  reviewed and are negative.      Physical Exam     Initial Vitals [01/01/22 1114]   BP Pulse Resp Temp SpO2   96/63 81 20 98 °F (36.7 °C) 100 %      MAP       --         Physical Exam    Nursing note and vitals reviewed.  Constitutional: She appears well-developed and well-nourished. She is not diaphoretic. No distress.   HENT:   Head: Normocephalic and atraumatic.   Right Ear: External ear normal.   Left Ear: External ear normal.   Nose: Nose normal.   Mouth/Throat: Oropharynx is clear and moist. No oropharyngeal exudate.   Eyes: Conjunctivae and EOM are normal. Pupils are equal, round, and reactive to light. Right eye exhibits no discharge. Left eye exhibits no discharge. No scleral icterus.   Neck: Neck supple. No thyromegaly present. No tracheal deviation present. No JVD present.   Normal range of motion.  Cardiovascular: Normal rate, regular rhythm, normal heart sounds and intact distal pulses. Exam reveals no gallop and no friction rub.    No murmur heard.  Pulmonary/Chest: Breath sounds normal. No stridor. No respiratory distress. She has no wheezes. She has no rhonchi. She has no rales. She exhibits tenderness.   Tenderness to palpation of sternal border this reproduces the discomfort   Abdominal: Abdomen is soft. Bowel sounds are normal. She exhibits no distension and no mass. There is no abdominal tenderness. There is no rebound and no guarding.   Musculoskeletal:         General: No tenderness or edema. Normal range of motion.      Cervical back: Normal range of motion and neck supple.     Lymphadenopathy:     She has no cervical adenopathy.   Neurological: She is alert and oriented to person, place, and time. She has normal strength. No cranial nerve deficit or sensory deficit.   Skin: Skin is warm and dry. No rash and no abscess noted. No erythema. No pallor.         ED Course   Procedures  Labs Reviewed   URINALYSIS, REFLEX TO URINE CULTURE - Abnormal; Notable for the following components:       Result  Value    Occult Blood UA 2+ (*)     Leukocytes, UA Trace (*)     All other components within normal limits    Narrative:     Specimen Source->Urine   CBC W/ AUTO DIFFERENTIAL - Abnormal; Notable for the following components:    RBC 3.09 (*)     Hemoglobin 9.0 (*)     Hematocrit 28.7 (*)     MCHC 31.4 (*)     MPV 8.6 (*)     All other components within normal limits   COMPREHENSIVE METABOLIC PANEL - Abnormal; Notable for the following components:    Sodium 134 (*)     Total Protein 8.6 (*)     eGFR if  56.5 (*)     eGFR if non  49.0 (*)     All other components within normal limits   URINALYSIS MICROSCOPIC - Abnormal; Notable for the following components:    RBC, UA 11 (*)     All other components within normal limits    Narrative:     Specimen Source->Urine   CULTURE, GONOCOCCUS   C. TRACHOMATIS/N. GONORRHOEAE BY AMP DNA   TROPONIN I   SARS-COV-2 RNA AMPLIFICATION, QUAL   VAGINAL SCREEN   POCT URINE PREGNANCY        ECG Results          EKG 12-lead (In process)  Result time 01/01/22 14:05:06    In process by Interface, Lab In [facility] (01/01/22 14:05:06)                 Narrative:    Test Reason : R07.9,    Vent. Rate : 069 BPM     Atrial Rate : 069 BPM     P-R Int : 198 ms          QRS Dur : 082 ms      QT Int : 450 ms       P-R-T Axes : 066 048 015 degrees     QTc Int : 482 ms    Normal sinus rhythm  Normal ECG  When compared with ECG of 01-JAN-2022 11:20,  No significant change was found    Referred By: [email or web address]   SELF           Confirmed By:                              EKG 12-lead (In process)  Result time 01/01/22 12:07:35    In process by Interface, Lab In [facility] (01/01/22 12:07:35)                 Narrative:    Test Reason : R07.9,    Vent. Rate : 080 BPM     Atrial Rate : 080 BPM     P-R Int : 192 ms          QRS Dur : 082 ms      QT Int : 416 ms       P-R-T Axes : 078 055 050 degrees     QTc Int : 479 ms    Normal sinus rhythm  Nonspecific T wave abnormality  Prolonged  QT  Abnormal ECG  No previous ECGs available    Referred By: [email or web address]   SELF           Confirmed By:                             Imaging Results          X-Ray Chest AP Portable (Final result)  Result time 01/01/22 11:50:29    Final result by Negro Murillo MD (01/01/22 11:50:29)                 Impression:      No acute pulmonary process.      Electronically signed by: Negro Murillo MD  Date:    01/01/2022  Time:    11:50             Narrative:    EXAMINATION:  XR CHEST AP PORTABLE    CLINICAL HISTORY:  Chest pain, unspecified    COMPARISON:  None available    FINDINGS:  Cardiac silhouette size is within normal limits.  Lungs are clear with no large pleural effusion or pneumothorax evident.  No acute osseous abnormality.                                 Medications   naproxen tablet 500 mg (500 mg Oral Given 1/1/22 1612)     Medical Decision Making:   History:   Old Medical Records: I decided to obtain old medical records.  Initial Assessment:   Emergent evaluation of a 46-year-old female presenting with dysuria and chest wall pain differential diagnosis includes infection, electrolyte abnormality, endocrine dysfunction, musculoskeletal pain            Attending Attestation:             Attending ED Notes:   Patient's labs show no significant acute abnormalities given patient's chronic chest pain I have low suspicion for ACS at this time patient is referred to Cardiology for further evaluation and management of her chest pain patient is additionally noted to have a mild bladder infection and will be started on a course of Macrobid patient is referred to PCP for further evaluation and management.  Patient is advised to return immediately to the emergency department for any worsening or for any further concerns.               Clinical Impression:   Final diagnoses:  [R07.9] Chest pain (Primary)  [N39.0] Urinary tract infection without hematuria, site unspecified          ED Disposition Condition    Discharge  Stable        ED Prescriptions     Medication Sig Dispense Start Date End Date Auth. Provider    nitrofurantoin, macrocrystal-monohydrate, (MACROBID) 100 MG capsule Take 1 capsule (100 mg total) by mouth 2 (two) times daily. for 5 days 10 capsule 1/1/2022 1/6/2022 Julio Ayoub MD    naproxen (NAPROSYN) 500 MG tablet Take 1 tablet (500 mg total) by mouth 2 (two) times daily as needed (As needed for pain, take with meals). 20 tablet 1/1/2022  Julio Ayoub MD        Follow-up Information     Follow up With Specialties Details Why Contact Info Additional Information    Alexandra Guerra MD Family Medicine Schedule an appointment as soon as possible for a visit in 2 days  [address] [place] [address]  [place] [address]  [phone]       Critical access hospital - Emergency Dept Emergency Medicine  If symptoms worsen [address] [facility] [id number] [phone] 1st floor    Kwabena Seals MD INTERVENTIONAL CARDIOLOGY, Cardiology, Cardiovascular Disease Schedule an appointment as soon as possible for a visit in 2 days  [address] [facility]  [address]  [facility] [address]  [phone]              Julio Ayoub MD  01/01/22 7609

## 2022-01-01 NOTE — ED NOTES
Pt c/o sternal chest pain & abdominal pain. Pt's vital signs are stable, is in no distress, and is AOx4.

## 2022-01-03 LAB
CHLAMYDIA, AMPLIFIED DNA: NEGATIVE
N GONORRHOEAE, AMPLIFIED DNA: NEGATIVE

## 2022-01-05 LAB
BACTERIA GENITAL AEROBE CULT: NORMAL
GRAM STN SPEC: NORMAL

## 2022-01-10 RX ORDER — HYDROCODONE BITARTRATE AND ACETAMINOPHEN 7.5; 325 MG/1; MG/1
1 TABLET ORAL EVERY 6 HOURS PRN
Qty: 60 TABLET | Refills: 0 | OUTPATIENT
Start: 2022-01-10

## 2022-02-10 ENCOUNTER — OFFICE VISIT (OUTPATIENT)
Dept: HEMATOLOGY/ONCOLOGY | Facility: CLINIC | Age: 47
End: 2022-02-10
Payer: MEDICAID

## 2022-02-10 VITALS
DIASTOLIC BLOOD PRESSURE: 67 MMHG | RESPIRATION RATE: 18 BRPM | WEIGHT: 210 LBS | HEIGHT: 68 IN | TEMPERATURE: 97 F | SYSTOLIC BLOOD PRESSURE: 105 MMHG | HEART RATE: 61 BPM | BODY MASS INDEX: 31.83 KG/M2

## 2022-02-10 DIAGNOSIS — N39.0 RECURRENT UTI (URINARY TRACT INFECTION): Primary | ICD-10-CM

## 2022-02-10 DIAGNOSIS — M54.50 CHRONIC MIDLINE LOW BACK PAIN WITHOUT SCIATICA: Primary | ICD-10-CM

## 2022-02-10 DIAGNOSIS — G89.29 CHRONIC MIDLINE LOW BACK PAIN WITHOUT SCIATICA: Primary | ICD-10-CM

## 2022-02-10 DIAGNOSIS — G89.29 CHRONIC MIDLINE LOW BACK PAIN WITHOUT SCIATICA: ICD-10-CM

## 2022-02-10 DIAGNOSIS — D63.8 ANEMIA IN OTHER CHRONIC DISEASES CLASSIFIED ELSEWHERE: Primary | ICD-10-CM

## 2022-02-10 DIAGNOSIS — M54.50 CHRONIC MIDLINE LOW BACK PAIN WITHOUT SCIATICA: ICD-10-CM

## 2022-02-10 DIAGNOSIS — R93.7 ABNORMAL BONE XRAY: ICD-10-CM

## 2022-02-10 PROCEDURE — 3008F BODY MASS INDEX DOCD: CPT | Mod: S$GLB,,, | Performed by: INTERNAL MEDICINE

## 2022-02-10 PROCEDURE — 99213 PR OFFICE/OUTPT VISIT, EST, LEVL III, 20-29 MIN: ICD-10-PCS | Mod: S$GLB,,, | Performed by: INTERNAL MEDICINE

## 2022-02-10 PROCEDURE — 3074F SYST BP LT 130 MM HG: CPT | Mod: S$GLB,,, | Performed by: INTERNAL MEDICINE

## 2022-02-10 PROCEDURE — 3074F PR MOST RECENT SYSTOLIC BLOOD PRESSURE < 130 MM HG: ICD-10-PCS | Mod: S$GLB,,, | Performed by: INTERNAL MEDICINE

## 2022-02-10 PROCEDURE — 3078F PR MOST RECENT DIASTOLIC BLOOD PRESSURE < 80 MM HG: ICD-10-PCS | Mod: S$GLB,,, | Performed by: INTERNAL MEDICINE

## 2022-02-10 PROCEDURE — 3008F PR BODY MASS INDEX (BMI) DOCUMENTED: ICD-10-PCS | Mod: S$GLB,,, | Performed by: INTERNAL MEDICINE

## 2022-02-10 PROCEDURE — 3078F DIAST BP <80 MM HG: CPT | Mod: S$GLB,,, | Performed by: INTERNAL MEDICINE

## 2022-02-10 PROCEDURE — 99213 OFFICE O/P EST LOW 20 MIN: CPT | Mod: S$GLB,,, | Performed by: INTERNAL MEDICINE

## 2022-02-10 RX ORDER — NITROFURANTOIN 25; 75 MG/1; MG/1
100 CAPSULE ORAL 2 TIMES DAILY
Qty: 10 CAPSULE | Refills: 0 | Status: SHIPPED | OUTPATIENT
Start: 2022-02-10 | End: 2022-02-15

## 2022-02-11 RX ORDER — HYDROCODONE BITARTRATE AND ACETAMINOPHEN 7.5; 325 MG/1; MG/1
1 TABLET ORAL EVERY 6 HOURS PRN
Qty: 60 TABLET | Refills: 0 | Status: SHIPPED | OUTPATIENT
Start: 2022-02-11 | End: 2022-03-31 | Stop reason: SDUPTHER

## 2022-02-12 NOTE — PROGRESS NOTES
[place] Hematology Oncology In Office subsequent Encounter Note  2/10/22    Subjective:      Patient ID:   Robina Potter                                       [address]  46 y.o. female                                           [place] [address]  1975  [facility], [facility]    Chief Complaint:   Anemia, LBP    HPI:  46 y.o. female with anemia 2nd decreased cellularity of bone marrow.  Chronic low back pain sx +.  Applies heat with relief.  Admits to increased LBP sx, pain in hand and knee joints  with damp, cold weather of last few days.    She had percutaneous needle biopsy of left inguinal lymph node.  The pathology report showed granulomatous lymphadenitis with noncaseating granuloma.  Differential diagnosis included sarcoidosis, infection, and lymphoma.  Radiologist recommended open biopsy with additional tissue to rule out lymphoma.    I made a referral for her to see Dr. Rendon for open lymph node biopsy.  Specimen would be submitted for AFB and Gram stain,  AFB culture and routine culture and flow cytometry studies for leukemia lymphoma phenotyping submitted on a fresh lymph node biopsy,   not placed in formalin.  Also the lymph node biopsy would be submitted for routine H and E stain per pathology.    I spoke with Dr. Alba of pathology.  The flow cytometry studies did not show lymphoma.  Dr. Marcos will be issuing the final pathology report.  The culture from the lymph node biopsy returned 3+ positive.  Achromobacter xylosoxidans.  She may have systemic involvement with this organism with lymphadenopathy and bone involvement.  Will make a referral to Infectious Disease specialist to see how this will be treated?    She saw Dr. Vick of ID in [place].  He does not think this is TB.  He has given her antibiotics x's 14 days.  Overall pain sx are improved.    She lost her antibiotics, we called her pharmacist and reordered her bactrim BID for her.  We received call from [place] [facility], that she  has AFB on C/S of LN.    GERD +.  Pyelonephritis +.  Fibroids hx.  Recurrant UTI.  Cystoscopy done.    Seen for chest pain sx, CAT negative for PE.  Dr. Strong.  She has GI sx, on meds per Dr. Strong..  She saw Dr. Strong, may need hernia repair.  Saw Dr. Spence for migraine eval? Sx better.  Bladder eval negative.  Dr. Méndez.  She denies bladder sx now.    No recent URI or UTI sx.    Refill meds for pain control.    She has moved to [facility] to see Pulmonary MD 21.  She does not agree to ID appt, until she has seen Pulm. MD.  She did not keep her appt with pulmonary, Dr. Adams.    Smoke /3 ppd.  Etoh no.  Job, security worker.    Cholecystectomy +, M0.    Mom  SLE.  1 child cerebral palsy.      ROS:   GEN: normal without any fever, night sweats or weight loss  HEENT:  HA's better  CV: normal with no CP, SOB, PND, SIMON or orthopnea  PULM: See HPI.  GI: GERD.  See history of present illness.  : Pyelonephritis hx. SEE HPI.  BREAST: normal with no mass, discharge, pain  SKIN: normal with no rash, erythema.    Review of patient's allergies indicates:  No Known Allergies    Current Outpatient Medications:     cefTRIAXone (ROCEPHIN) 1 gram injection, 1 g., Disp: , Rfl:     cetirizine (ZYRTEC) 10 MG tablet, , Disp: , Rfl:     clotrimazole (MYCELEX) 10 mg lazaro, , Disp: , Rfl:     DULoxetine (CYMBALTA) 60 MG capsule, Take 60 mg by mouth., Disp: , Rfl:     fluticasone (FLONASE) 50 mcg/actuation nasal spray, , Disp: , Rfl:     HYDROcodone-acetaminophen (NORCO) 7.5-325 mg per tablet, Take 1 tablet by mouth every 6 (six) hours as needed for Pain., Disp: 60 tablet, Rfl: 0    hydrocodone-acetaminophen (VICODIN) 5-500 mg per tablet, , Disp: , Rfl:     hydrOXYzine pamoate (VISTARIL) 25 MG Cap, TAKE 1 CAPSULE BY MOUTH 3 TIMES DAILY AS NEEDED FOR ITCHING, Disp: , Rfl:     loratadine (CLARITIN) 10 mg tablet, , Disp: , Rfl:     naproxen (NAPROSYN) 500 MG tablet, Take 1 tablet (500 mg total) by mouth 2 (two)  "times daily as needed (As needed for pain, take with meals)., Disp: 20 tablet, Rfl: 0    nitrofurantoin, macrocrystal-monohydrate, (MACROBID) 100 MG capsule, Take 1 capsule (100 mg total) by mouth 2 (two) times daily. (Every 12 hours) for 5 days, Disp: 10 capsule, Rfl: 0    omeprazole (PRILOSEC) 20 MG capsule, , Disp: , Rfl:     topiramate (TOPAMAX) 100 MG tablet, , Disp: , Rfl:     trazodone (DESYREL) 100 MG tablet, , Disp: , Rfl:           Objective:   Vitals:  Blood pressure 105/67, pulse 61, temperature 97.4 °F (36.3 °C), resp. rate 18, height 5' 8" (1.727 m), weight 95.3 kg (210 lb).    Physical Examination:   GEN: no apparent distress, comfortable  HEAD: atraumatic and normocephalic  EYES: no pallor, no icterus  ENT: no pharyngeal erythema, external ears WNL; no nasal discharge  NECK: no masses, thyroid normal, trachea midline, no LAD/LN's, supple  CV: RRR with no murmur; normal pulse; normal S1 and S2; no pedal edema  CHEST: Normal respiratory effort; CTAB;  no wheeze or crackles.  I believe the soft tissue over the sternum is full, towards the R chest wall.  ABDOM: nontender and nondistended; soft;  no rebound/guarding, L/S NP  MUSC/Skeletal: ROM normal; no crepitus; joints normal; no deformities  EXTREM: no clubbing, cyanosis, inflammation   SKIN: no rashes.  : no CVAT.  NEURO: grossly intact; motor/sensory WNL; no tremors  PSYCH: normal mood, affect and behavior  LYMPH: L inguinal LN      Radiology/Diagnostic Studies:    Mamm 1/2018 negative.  Bone marrow 2014, 25% cellularity.  IgG 2,000 now at 1862.  Hgb 9.5 WBC 4500, plt cnt 598,000.  Polyclonal gammopathy    CAT of chest mottled sternum, 2.2 axillary LN  Bone Scan multiple gilberto of uptake including sacrum.  CAT diffuse LN, sclerotic bone lesions  Mamm YONATHAN    Assessment:   (1) 46 y.o. female with chronic anemia secondary to decreased bone marrow cellularity.  Blood counts low but stable.      (2)Chronic LBP.  Fullness at R sternal area.  Abnormal " Bone Scan, possible metastatic dx.    Discussed with radiologist.  Bx of L iliac LN showed granulomatous lymphadenopathy with noncaseating granuloma.  Differential diagnosis includes sarcoidosis, infectious lymphadenopathy, and non-Hodgkin's lymphoma.    Dr. Rendon for open biopsy of left inguinal lymph node.  Sample of the lymph node biopsy, not placed in formalin, for AFB stain and Gram stain and AFB C/S and routine C/S.  Flow cytometry studies on the lymph node biopsy for leukemia lymphoma phenotyping were negative.    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  Dr. Vick. Of ID has seen her and is treating her with antibiotics.  [facility] called, that she had AFB on C/S of R inguinal LN.    Appt with Dr. Adams 12/2/21., for eval and management for Sarcoid?  She did not keep appt.    Refill pain meds.  RTC 1 month.

## 2022-02-13 RX ORDER — HYDROCODONE BITARTRATE AND ACETAMINOPHEN 7.5; 325 MG/1; MG/1
1 TABLET ORAL EVERY 6 HOURS PRN
Qty: 60 TABLET | Refills: 0 | Status: SHIPPED | OUTPATIENT
Start: 2022-02-13 | End: 2022-02-28

## 2022-03-31 ENCOUNTER — OFFICE VISIT (OUTPATIENT)
Dept: HEMATOLOGY/ONCOLOGY | Facility: CLINIC | Age: 47
End: 2022-03-31
Payer: MEDICAID

## 2022-03-31 ENCOUNTER — TELEPHONE (OUTPATIENT)
Dept: HEMATOLOGY/ONCOLOGY | Facility: CLINIC | Age: 47
End: 2022-03-31

## 2022-03-31 VITALS
HEIGHT: 68 IN | WEIGHT: 206 LBS | RESPIRATION RATE: 18 BRPM | BODY MASS INDEX: 31.22 KG/M2 | TEMPERATURE: 97 F | DIASTOLIC BLOOD PRESSURE: 72 MMHG | HEART RATE: 76 BPM | SYSTOLIC BLOOD PRESSURE: 121 MMHG

## 2022-03-31 DIAGNOSIS — M54.50 CHRONIC MIDLINE LOW BACK PAIN WITHOUT SCIATICA: ICD-10-CM

## 2022-03-31 DIAGNOSIS — R93.7 ABNORMAL BONE XRAY: ICD-10-CM

## 2022-03-31 DIAGNOSIS — G89.29 CHRONIC MIDLINE LOW BACK PAIN WITHOUT SCIATICA: ICD-10-CM

## 2022-03-31 DIAGNOSIS — R59.0 LYMPHADENOPATHY, INGUINAL: ICD-10-CM

## 2022-03-31 DIAGNOSIS — D63.8 ANEMIA IN OTHER CHRONIC DISEASES CLASSIFIED ELSEWHERE: Primary | ICD-10-CM

## 2022-03-31 DIAGNOSIS — D47.2 MONOCLONAL GAMMOPATHY OF UNKNOWN SIGNIFICANCE (MGUS): ICD-10-CM

## 2022-03-31 PROCEDURE — 99213 PR OFFICE/OUTPT VISIT, EST, LEVL III, 20-29 MIN: ICD-10-PCS | Mod: S$GLB,,, | Performed by: INTERNAL MEDICINE

## 2022-03-31 PROCEDURE — 3078F DIAST BP <80 MM HG: CPT | Mod: S$GLB,,, | Performed by: INTERNAL MEDICINE

## 2022-03-31 PROCEDURE — 99213 OFFICE O/P EST LOW 20 MIN: CPT | Mod: S$GLB,,, | Performed by: INTERNAL MEDICINE

## 2022-03-31 PROCEDURE — 3078F PR MOST RECENT DIASTOLIC BLOOD PRESSURE < 80 MM HG: ICD-10-PCS | Mod: S$GLB,,, | Performed by: INTERNAL MEDICINE

## 2022-03-31 PROCEDURE — 3074F PR MOST RECENT SYSTOLIC BLOOD PRESSURE < 130 MM HG: ICD-10-PCS | Mod: S$GLB,,, | Performed by: INTERNAL MEDICINE

## 2022-03-31 PROCEDURE — 3008F PR BODY MASS INDEX (BMI) DOCUMENTED: ICD-10-PCS | Mod: S$GLB,,, | Performed by: INTERNAL MEDICINE

## 2022-03-31 PROCEDURE — 3074F SYST BP LT 130 MM HG: CPT | Mod: S$GLB,,, | Performed by: INTERNAL MEDICINE

## 2022-03-31 PROCEDURE — 3008F BODY MASS INDEX DOCD: CPT | Mod: S$GLB,,, | Performed by: INTERNAL MEDICINE

## 2022-03-31 RX ORDER — HYDROCODONE BITARTRATE AND ACETAMINOPHEN 7.5; 325 MG/1; MG/1
1 TABLET ORAL EVERY 6 HOURS PRN
Qty: 120 TABLET | Refills: 0 | Status: SHIPPED | OUTPATIENT
Start: 2022-03-31 | End: 2022-05-05 | Stop reason: SDUPTHER

## 2022-04-01 ENCOUNTER — TELEPHONE (OUTPATIENT)
Dept: HEMATOLOGY/ONCOLOGY | Facility: CLINIC | Age: 47
End: 2022-04-01
Payer: MEDICAID

## 2022-04-01 NOTE — TELEPHONE ENCOUNTER
Tory, could you help her get a PMD in [place], perhaps at that family clinic down [address]?    Elenita, could you talk with her and her dificulties  Getting her norco on the medicaid.?  I don't know if anything can help her?

## 2022-04-01 NOTE — PROGRESS NOTES
Left voicemail for patient to return my call to provide her with the number to the [facility] to establish care with a PCP.  And to provide her with the information for Good Rx; she can have her Norco 7.25/325/120 quantity filled at [facility] for $22.02.  Medicaid will not pay for a script of 120.

## 2022-04-01 NOTE — PROGRESS NOTES
[facility] Hematology Oncology In Office subsequent Encounter Note  3/31/22    Subjective:      Patient ID:   Robina Potter                                       [address]  46 y.o. female                                           [place] [address]  1975  [facility], [facility]    Chief Complaint:   Anemia, LBP    HPI:  46 y.o. female with anemia 2nd decreased cellularity of bone marrow.  Chronic low back pain sx +.  Applies heat with relief.  Admits to increased LBP sx, pain in hand and knee joints  with damp, cold weather of last few days.    She had percutaneous needle biopsy of left inguinal lymph node.  The pathology report showed granulomatous lymphadenitis with noncaseating granuloma.  Differential diagnosis included sarcoidosis, infection, and lymphoma.  Radiologist recommended open biopsy with additional tissue to rule out lymphoma.    I made a referral for her to see Dr. Rendon for open lymph node biopsy.  Specimen would be submitted for AFB and Gram stain,  AFB culture and routine culture and flow cytometry studies for leukemia lymphoma phenotyping submitted on a fresh lymph node biopsy,   not placed in formalin.  Also the lymph node biopsy would be submitted for routine H and E stain per pathology.    I spoke with Dr. Alba of pathology.  The flow cytometry studies did not show lymphoma.  Dr. Marcos will be issuing the final pathology report.  The culture from the lymph node biopsy returned 3+ positive.  Achromobacter xylosoxidans.  She may have systemic involvement with this organism with lymphadenopathy and bone involvement.  Will make a referral to Infectious Disease specialist to see how this will be treated?    She saw Dr. Vick of ID in [place].  He does not think this is TB.  He has given her antibiotics x's 14 days.  Overall pain sx are improved.    She lost her antibiotics, we called her pharmacist and reordered her bactrim BID for her.  We received call from [facility], that she  has AFB on C/S of LN.    GERD +.  Pyelonephritis +.  Fibroids hx.  Recurrant UTI.  Cystoscopy done.    Seen for chest pain sx, CAT negative for PE.  Dr. Strong.  She has GI sx, on meds per Dr. Strong..  She saw Dr. Strong, may need hernia repair.  Saw Dr. Spence for migraine eval? Sx better.  Bladder eval negative.  Dr. Méndez.  She denies bladder sx now.    No recent URI or UTI sx.    Refill meds for pain control.    She has moved to [facility] to see Pulmonary MD 21.  She does not agree to ID appt, until she has seen Pulm. MD.  She did not keep her appt with pulmonary, Dr. Adams.    Smoke /3 ppd.  Etoh no.  Job, security worker.    Cholecystectomy +, M0.    Mom  SLE.  1 child cerebral palsy.    Refill meds.      ROS:   GEN: normal without any fever, night sweats or weight loss  HEENT:  HA's better  CV: normal with no CP, SOB, PND, SIMON or orthopnea  PULM: See HPI.  GI: GERD.  See history of present illness.  : Pyelonephritis hx. SEE HPI.  BREAST: normal with no mass, discharge, pain  SKIN: normal with no rash, erythema.    Review of patient's allergies indicates:  No Known Allergies    Current Outpatient Medications:     cetirizine (ZYRTEC) 10 MG tablet, , Disp: , Rfl:     clotrimazole (MYCELEX) 10 mg lazaro, , Disp: , Rfl:     DULoxetine (CYMBALTA) 60 MG capsule, Take 60 mg by mouth., Disp: , Rfl:     fluticasone (FLONASE) 50 mcg/actuation nasal spray, , Disp: , Rfl:     hydrocodone-acetaminophen (VICODIN) 5-500 mg per tablet, , Disp: , Rfl:     hydrOXYzine pamoate (VISTARIL) 25 MG Cap, TAKE 1 CAPSULE BY MOUTH 3 TIMES DAILY AS NEEDED FOR ITCHING, Disp: , Rfl:     loratadine (CLARITIN) 10 mg tablet, , Disp: , Rfl:     naproxen (NAPROSYN) 500 MG tablet, Take 1 tablet (500 mg total) by mouth 2 (two) times daily as needed (As needed for pain, take with meals)., Disp: 20 tablet, Rfl: 0    omeprazole (PRILOSEC) 20 MG capsule, , Disp: , Rfl:     topiramate (TOPAMAX) 100 MG tablet, , Disp: , Rfl:      "trazodone (DESYREL) 100 MG tablet, , Disp: , Rfl:     HYDROcodone-acetaminophen (NORCO) 7.5-325 mg per tablet, Take 1 tablet by mouth every 6 (six) hours as needed for Pain., Disp: 120 tablet, Rfl: 0          Objective:   Vitals:  Blood pressure 121/72, pulse 76, temperature 97.2 °F (36.2 °C), resp. rate 18, height 5' 8" (1.727 m), weight 93.4 kg (206 lb).    Physical Examination:   GEN: no apparent distress, comfortable  HEAD: atraumatic and normocephalic  EYES: no pallor, no icterus  ENT: no pharyngeal erythema, external ears WNL; no nasal discharge  NECK: no masses, thyroid normal, trachea midline, no LAD/LN's, supple  CV: RRR with no murmur; normal pulse; normal S1 and S2; no pedal edema  CHEST: Normal respiratory effort; CTAB;  no wheeze or crackles.  I believe the soft tissue over the sternum is full, towards the R chest wall.  ABDOM: nontender and nondistended; soft;  no rebound/guarding, L/S NP  MUSC/Skeletal: ROM normal; no crepitus; joints normal; no deformities  EXTREM: no clubbing, cyanosis, inflammation   SKIN: no rashes.  : no CVAT.  NEURO: grossly intact; motor/sensory WNL; no tremors  PSYCH: normal mood, affect and behavior  LYMPH: L inguinal LN      Radiology/Diagnostic Studies:    Mamm 1/2018 negative.  Bone marrow 2014, 25% cellularity.  IgG 2,000 now at 1862.  Hgb 9.5 WBC 4500, plt cnt 598,000.  Polyclonal gammopathy    CAT of chest mottled sternum, 2.2 axillary LN  Bone Scan multiple gilberto of uptake including sacrum.  CAT diffuse LN, sclerotic bone lesions  Mamm YONATHAN    Assessment:   (1) 46 y.o. female with chronic anemia secondary to decreased bone marrow cellularity.  Blood counts low but stable.      (2)Chronic LBP.  Fullness at R sternal area.  Abnormal Bone Scan, possible metastatic dx.    Discussed with radiologist.  Bx of L iliac LN showed granulomatous lymphadenopathy with noncaseating granuloma.  Differential diagnosis includes sarcoidosis, infectious lymphadenopathy, and non-Hodgkin's " lymphoma.    Dr. Rendon for open biopsy of left inguinal lymph node.  Sample of the lymph node biopsy, not placed in formalin, for AFB stain and Gram stain and AFB C/S and routine C/S.  Flow cytometry studies on the lymph node biopsy for leukemia lymphoma phenotyping were negative.    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  Dr. Vick. Of ID has seen her and is treating her with antibiotics.  [facility] called, that she had AFB on C/S of R inguinal LN.    Appt with Dr. Adams 12/2/21., for eval and management for Sarcoid?  She did not keep appt.    Refill pain meds.  RTC 1 month.

## 2022-05-05 ENCOUNTER — OFFICE VISIT (OUTPATIENT)
Dept: HEMATOLOGY/ONCOLOGY | Facility: CLINIC | Age: 47
End: 2022-05-05
Payer: MEDICAID

## 2022-05-05 VITALS
HEART RATE: 81 BPM | BODY MASS INDEX: 30.31 KG/M2 | HEIGHT: 68 IN | RESPIRATION RATE: 18 BRPM | WEIGHT: 200 LBS | DIASTOLIC BLOOD PRESSURE: 52 MMHG | SYSTOLIC BLOOD PRESSURE: 76 MMHG

## 2022-05-05 DIAGNOSIS — G89.29 CHRONIC MIDLINE LOW BACK PAIN WITHOUT SCIATICA: ICD-10-CM

## 2022-05-05 DIAGNOSIS — M54.50 CHRONIC MIDLINE LOW BACK PAIN WITHOUT SCIATICA: ICD-10-CM

## 2022-05-05 DIAGNOSIS — R93.7 ABNORMAL BONE XRAY: ICD-10-CM

## 2022-05-05 DIAGNOSIS — R93.7 ABNORMAL BONE XRAY: Primary | ICD-10-CM

## 2022-05-05 DIAGNOSIS — D63.8 ANEMIA IN OTHER CHRONIC DISEASES CLASSIFIED ELSEWHERE: ICD-10-CM

## 2022-05-05 DIAGNOSIS — R59.0 LYMPHADENOPATHY, INGUINAL: ICD-10-CM

## 2022-05-05 PROCEDURE — 3008F BODY MASS INDEX DOCD: CPT | Mod: CPTII,S$GLB,, | Performed by: INTERNAL MEDICINE

## 2022-05-05 PROCEDURE — 3078F DIAST BP <80 MM HG: CPT | Mod: CPTII,S$GLB,, | Performed by: INTERNAL MEDICINE

## 2022-05-05 PROCEDURE — 99213 OFFICE O/P EST LOW 20 MIN: CPT | Mod: S$GLB,,, | Performed by: INTERNAL MEDICINE

## 2022-05-05 PROCEDURE — 3078F PR MOST RECENT DIASTOLIC BLOOD PRESSURE < 80 MM HG: ICD-10-PCS | Mod: CPTII,S$GLB,, | Performed by: INTERNAL MEDICINE

## 2022-05-05 PROCEDURE — 3074F PR MOST RECENT SYSTOLIC BLOOD PRESSURE < 130 MM HG: ICD-10-PCS | Mod: CPTII,S$GLB,, | Performed by: INTERNAL MEDICINE

## 2022-05-05 PROCEDURE — 99213 PR OFFICE/OUTPT VISIT, EST, LEVL III, 20-29 MIN: ICD-10-PCS | Mod: S$GLB,,, | Performed by: INTERNAL MEDICINE

## 2022-05-05 PROCEDURE — 3074F SYST BP LT 130 MM HG: CPT | Mod: CPTII,S$GLB,, | Performed by: INTERNAL MEDICINE

## 2022-05-05 PROCEDURE — 3008F PR BODY MASS INDEX (BMI) DOCUMENTED: ICD-10-PCS | Mod: CPTII,S$GLB,, | Performed by: INTERNAL MEDICINE

## 2022-05-05 RX ORDER — HYDROCODONE BITARTRATE AND ACETAMINOPHEN 7.5; 325 MG/1; MG/1
1 TABLET ORAL EVERY 6 HOURS PRN
Qty: 120 TABLET | Refills: 0 | Status: SHIPPED | OUTPATIENT
Start: 2022-05-05 | End: 2022-06-09 | Stop reason: SDUPTHER

## 2022-05-05 NOTE — PROGRESS NOTES
[facility] Hematology Oncology In Office subsequent Encounter Note  5/5/22    Subjective:      Patient ID:   Robina Potter                                         46 y.o. female                                             1975      Chief Complaint:   Anemia, LBP    HPI:  46 y.o. female with anemia 2nd decreased cellularity of bone marrow.  Chronic low back pain sx +.  Applies heat with relief.  Admits to increased LBP sx, pain in hand and knee joints  with damp, cold weather of last few days.    She had percutaneous needle biopsy of left inguinal lymph node.  The pathology report showed granulomatous lymphadenitis with noncaseating granuloma.  Differential diagnosis included sarcoidosis, infection, and lymphoma.  Radiologist recommended open biopsy with additional tissue to rule out lymphoma.    I made a referral for her to see Dr. Rendon for open lymph node biopsy.  Specimen would be submitted for AFB and Gram stain,  AFB culture and routine culture and flow cytometry studies for leukemia lymphoma phenotyping submitted on a fresh lymph node biopsy,   not placed in formalin.  Also the lymph node biopsy would be submitted for routine H and E stain per pathology.    I spoke with Dr. Alba of pathology.  The flow cytometry studies did not show lymphoma.  Dr. Marcos will be issuing the final pathology report.  The culture from the lymph node biopsy returned 3+ positive.  Achromobacter xylosoxidans.  She may have systemic involvement with this organism with lymphadenopathy and bone involvement.  Will make a referral to Infectious Disease specialist to see how this will be treated?    She saw Dr. Vick of ID in [place].  He does not think this is TB.  He has given her antibiotics x's 14 days.  Overall pain sx are improved.    She lost her antibiotics, we called her pharmacist and reordered her bactrim BID for her.  We received call from [facility], that she has AFB on C/S of LN.    GERD +.   Pyelonephritis +.  Fibroids hx.  Recurrant UTI.  Cystoscopy done.    Seen for chest pain sx, CAT negative for PE.  Dr. Strong.  She has GI sx, on meds per Dr. Strong..  She saw Dr. Strong, may need hernia repair.  Saw Dr. Spence for migraine eval? Sx better.  Bladder eval negative.  Dr. Méndez.  She denies bladder sx now.    No recent URI or UTI sx.    Refill meds for pain control.    Smoke 1/3 ppd.  Etoh no.  Job, security worker.    Cholecystectomy +, M0.    Mom  SLE.  1 child cerebral palsy.      ROS:   GEN: normal without any fever, night sweats or weight loss  HEENT:  HA's better  CV: normal with no CP, SOB, PND, SIMON or orthopnea  PULM: See HPI.  GI: GERD.  See history of present illness.  : Pyelonephritis hx. SEE HPI.  BREAST: normal with no mass, discharge, pain  SKIN: normal with no rash, erythema.    Review of patient's allergies indicates:  No Known Allergies    Current Outpatient Medications:     cetirizine (ZYRTEC) 10 MG tablet, , Disp: , Rfl:     clotrimazole (MYCELEX) 10 mg lazaro, , Disp: , Rfl:     DULoxetine (CYMBALTA) 60 MG capsule, Take 60 mg by mouth., Disp: , Rfl:     fluticasone (FLONASE) 50 mcg/actuation nasal spray, , Disp: , Rfl:     HYDROcodone-acetaminophen (NORCO) 7.5-325 mg per tablet, Take 1 tablet by mouth every 6 (six) hours as needed for Pain., Disp: 120 tablet, Rfl: 0    hydrocodone-acetaminophen (VICODIN) 5-500 mg per tablet, , Disp: , Rfl:     hydrOXYzine pamoate (VISTARIL) 25 MG Cap, TAKE 1 CAPSULE BY MOUTH 3 TIMES DAILY AS NEEDED FOR ITCHING, Disp: , Rfl:     loratadine (CLARITIN) 10 mg tablet, , Disp: , Rfl:     naproxen (NAPROSYN) 500 MG tablet, Take 1 tablet (500 mg total) by mouth 2 (two) times daily as needed (As needed for pain, take with meals)., Disp: 20 tablet, Rfl: 0    omeprazole (PRILOSEC) 20 MG capsule, , Disp: , Rfl:     topiramate (TOPAMAX) 100 MG tablet, , Disp: , Rfl:     trazodone (DESYREL) 100 MG tablet, , Disp: , Rfl:           Objective:  "  Vitals:  Blood pressure (!) 76/52, pulse 81, resp. rate 18, height 5' 8" (1.727 m), weight 90.7 kg (200 lb).    Physical Examination:   GEN: no apparent distress, comfortable  HEAD: atraumatic and normocephalic  EYES: no pallor, no icterus  ENT: no pharyngeal erythema, external ears WNL; no nasal discharge  NECK: no masses, thyroid normal, trachea midline, no LAD/LN's, supple  CV: RRR with no murmur; normal pulse; normal S1 and S2; no pedal edema  CHEST: Normal respiratory effort; CTAB;  no wheeze or crackles.  I believe the soft tissue over the sternum is full, towards the R chest wall.  ABDOM: nontender and nondistended; soft;  no rebound/guarding, L/S NP  MUSC/Skeletal: ROM normal; no crepitus; joints normal; no deformities  EXTREM: no clubbing, cyanosis, inflammation   SKIN: no rashes.  : no CVAT.  NEURO: grossly intact; motor/sensory WNL; no tremors  PSYCH: normal mood, affect and behavior  LYMPH: L inguinal LN      Radiology/Diagnostic Studies:    Mamm 1/2018 negative.  Bone marrow 2014, 25% cellularity.  IgG 2,000 now at 1862.  Hgb 9.5 WBC 4500, plt cnt 598,000.  Polyclonal gammopathy    CAT of chest mottled sternum, 2.2 axillary LN  Bone Scan multiple gilberto of uptake including sacrum.  CAT diffuse LN, sclerotic bone lesions  Mamm YONATHAN    Assessment:   (1) 46 y.o. female with chronic anemia secondary to decreased bone marrow cellularity.  Blood counts low but stable.      (2)Chronic LBP.  Fullness at R sternal area.  Abnormal Bone Scan, possible metastatic dx.    Discussed with radiologist.  Bx of L iliac LN showed granulomatous lymphadenopathy with noncaseating granuloma.  Differential diagnosis includes sarcoidosis, infectious lymphadenopathy, and non-Hodgkin's lymphoma.    Dr. Rendon for open biopsy of left inguinal lymph node.  Sample of the lymph node biopsy, not placed in formalin, for AFB stain and Gram stain and AFB C/S and routine C/S.  Flow cytometry studies on the lymph node biopsy for leukemia " lymphoma phenotyping were negative.    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  Dr. Vick. Of ID has seen her and is treating her with antibiotics.  [facility] called, that she had AFB on C/S of R inguinal LN.    Appt with Dr. Adams, for eval and management for Sarcoid?  Appt with Dr. Malave, for review of LN C/S, AFB status.  Appt with JESSICA KOENIG to help get this arranged.    RTC 1 month.

## 2022-05-06 ENCOUNTER — TELEPHONE (OUTPATIENT)
Dept: HEMATOLOGY/ONCOLOGY | Facility: CLINIC | Age: 47
End: 2022-05-06

## 2022-05-06 NOTE — PROGRESS NOTES
Patient was provided the number to the [facility] at [address] [phone].  Patient also informed about the [email or web address] coupon; [facility] $27.26, [facility] $22.02.

## 2022-06-09 DIAGNOSIS — G89.29 CHRONIC MIDLINE LOW BACK PAIN WITHOUT SCIATICA: ICD-10-CM

## 2022-06-09 DIAGNOSIS — R93.7 ABNORMAL BONE XRAY: ICD-10-CM

## 2022-06-09 DIAGNOSIS — M54.50 CHRONIC MIDLINE LOW BACK PAIN WITHOUT SCIATICA: ICD-10-CM

## 2022-06-09 NOTE — TELEPHONE ENCOUNTER
----- Message from Anita Ambrosio sent at 6/9/2022  1:02 PM CDT -----  PT. CAME IN FOR APPT. SAYS  APPT. WAS CHANGED WITH OUT HER KNOWLEDGE AND NEEDS HER MEDICATION     #[phone]

## 2022-06-10 RX ORDER — HYDROCODONE BITARTRATE AND ACETAMINOPHEN 7.5; 325 MG/1; MG/1
1 TABLET ORAL EVERY 6 HOURS PRN
Qty: 120 TABLET | Refills: 0 | Status: SHIPPED | OUTPATIENT
Start: 2022-06-10 | End: 2022-07-07 | Stop reason: SDUPTHER

## 2022-07-07 ENCOUNTER — OFFICE VISIT (OUTPATIENT)
Dept: HEMATOLOGY/ONCOLOGY | Facility: CLINIC | Age: 47
End: 2022-07-07
Payer: MEDICAID

## 2022-07-07 VITALS
HEART RATE: 74 BPM | SYSTOLIC BLOOD PRESSURE: 114 MMHG | BODY MASS INDEX: 30.62 KG/M2 | WEIGHT: 202 LBS | HEIGHT: 68 IN | RESPIRATION RATE: 18 BRPM | DIASTOLIC BLOOD PRESSURE: 70 MMHG

## 2022-07-07 DIAGNOSIS — G89.29 CHRONIC MIDLINE LOW BACK PAIN WITHOUT SCIATICA: ICD-10-CM

## 2022-07-07 DIAGNOSIS — M54.50 CHRONIC MIDLINE LOW BACK PAIN WITHOUT SCIATICA: ICD-10-CM

## 2022-07-07 DIAGNOSIS — R93.7 ABNORMAL BONE XRAY: ICD-10-CM

## 2022-07-07 PROCEDURE — 99213 OFFICE O/P EST LOW 20 MIN: CPT | Mod: S$GLB,,, | Performed by: INTERNAL MEDICINE

## 2022-07-07 PROCEDURE — 3078F DIAST BP <80 MM HG: CPT | Mod: CPTII,S$GLB,, | Performed by: INTERNAL MEDICINE

## 2022-07-07 PROCEDURE — 3008F BODY MASS INDEX DOCD: CPT | Mod: CPTII,S$GLB,, | Performed by: INTERNAL MEDICINE

## 2022-07-07 PROCEDURE — 3074F PR MOST RECENT SYSTOLIC BLOOD PRESSURE < 130 MM HG: ICD-10-PCS | Mod: CPTII,S$GLB,, | Performed by: INTERNAL MEDICINE

## 2022-07-07 PROCEDURE — 3074F SYST BP LT 130 MM HG: CPT | Mod: CPTII,S$GLB,, | Performed by: INTERNAL MEDICINE

## 2022-07-07 PROCEDURE — 1159F PR MEDICATION LIST DOCUMENTED IN MEDICAL RECORD: ICD-10-PCS | Mod: CPTII,S$GLB,, | Performed by: INTERNAL MEDICINE

## 2022-07-07 PROCEDURE — 3008F PR BODY MASS INDEX (BMI) DOCUMENTED: ICD-10-PCS | Mod: CPTII,S$GLB,, | Performed by: INTERNAL MEDICINE

## 2022-07-07 PROCEDURE — 1159F MED LIST DOCD IN RCRD: CPT | Mod: CPTII,S$GLB,, | Performed by: INTERNAL MEDICINE

## 2022-07-07 PROCEDURE — 99213 PR OFFICE/OUTPT VISIT, EST, LEVL III, 20-29 MIN: ICD-10-PCS | Mod: S$GLB,,, | Performed by: INTERNAL MEDICINE

## 2022-07-07 PROCEDURE — 3078F PR MOST RECENT DIASTOLIC BLOOD PRESSURE < 80 MM HG: ICD-10-PCS | Mod: CPTII,S$GLB,, | Performed by: INTERNAL MEDICINE

## 2022-07-07 RX ORDER — HYDROCODONE BITARTRATE AND ACETAMINOPHEN 7.5; 325 MG/1; MG/1
1 TABLET ORAL EVERY 6 HOURS PRN
Qty: 120 TABLET | Refills: 0 | Status: CANCELLED | OUTPATIENT
Start: 2022-07-07 | End: 2022-08-06

## 2022-07-07 RX ORDER — HYDROCODONE BITARTRATE AND ACETAMINOPHEN 7.5; 325 MG/1; MG/1
1 TABLET ORAL EVERY 6 HOURS PRN
Qty: 120 TABLET | Refills: 0 | Status: SHIPPED | OUTPATIENT
Start: 2022-07-07 | End: 2022-08-04 | Stop reason: SDUPTHER

## 2022-07-10 NOTE — PROGRESS NOTES
[facility] Hematology Oncology In Office subsequent Encounter Note  7/7/22    Subjective:      Patient ID:   Robina Potter                                         46 y.o. female                                             1975      Chief Complaint:   Anemia, LBP    HPI:  46 y.o. female with anemia 2nd decreased cellularity of bone marrow.  Chronic low back pain sx +.  Applies heat with relief.  Admits to increased LBP sx, pain in hand and knee joints  with damp, cold weather of last few days.    She had percutaneous needle biopsy of left inguinal lymph node.  The pathology report showed granulomatous lymphadenitis with noncaseating granuloma.  Differential diagnosis included sarcoidosis, infection, and lymphoma.  Radiologist recommended open biopsy with additional tissue to rule out lymphoma.    I made a referral for her to see Dr. Rendon for open lymph node biopsy.  Specimen would be submitted for AFB and Gram stain,  AFB culture and routine culture and flow cytometry studies for leukemia lymphoma phenotyping submitted on a fresh lymph node biopsy,   not placed in formalin.  Also the lymph node biopsy would be submitted for routine H and E stain per pathology.    I spoke with Dr. Alba of pathology.  The flow cytometry studies did not show lymphoma.  Dr. Marcos will be issuing the final pathology report.  The culture from the lymph node biopsy returned 3+ positive.  Achromobacter xylosoxidans.  She may have systemic involvement with this organism with lymphadenopathy and bone involvement.  Will make a referral to Infectious Disease specialist to see how this will be treated?    She saw Dr. Vick of ID in [place].  He does not think this is TB.  He has given her antibiotics x's 14 days.  Overall pain sx are improved.    She lost her antibiotics, we called her pharmacist and reordered her bactrim BID for her.  We received call from [facility], that she has AFB on C/S of LN.    GERD +.   "Pyelonephritis +.  Fibroids hx.  Recurrant UTI.  Cystoscopy done.    Seen for chest pain sx, CAT negative for PE.  Dr. Strong.  She has GI sx, on meds per Dr. Strong..  She saw Dr. Strong, may need hernia repair.  Saw Dr. Spence for migraine eval? Sx better.  Bladder eval negative.  Dr. Méndez.  She denies bladder sx now.    No recent URI or UTI sx.    Refill meds for pain control.    Smoke 1/3 ppd.  Etoh no.  Job, security worker.    Cholecystectomy +, M0.    Mom  SLE.  1 child cerebral palsy.      ROS:   GEN: normal without any fever, night sweats or weight loss  HEENT:  HA's better  CV: normal with no CP, SOB, PND, SIMON or orthopnea  PULM: See HPI.  GI: GERD.  See history of present illness.  : Pyelonephritis hx. SEE HPI.  BREAST: normal with no mass, discharge, pain  SKIN: normal with no rash, erythema.    Review of patient's allergies indicates:  No Known Allergies    Current Outpatient Medications:     HYDROcodone-acetaminophen (NORCO) 7.5-325 mg per tablet, Take 1 tablet by mouth every 6 (six) hours as needed for Pain., Disp: 120 tablet, Rfl: 0          Objective:   Vitals:  Blood pressure 114/70, pulse 74, resp. rate 18, height 5' 8" (1.727 m), weight 91.6 kg (202 lb).    Physical Examination:   GEN: no apparent distress, comfortable  HEAD: atraumatic and normocephalic  EYES: no pallor, no icterus  ENT: no pharyngeal erythema, external ears WNL; no nasal discharge  NECK: no masses, thyroid normal, trachea midline, no LAD/LN's, supple  CV: RRR with no murmur; normal pulse; normal S1 and S2; no pedal edema  CHEST: Normal respiratory effort; CTAB;  no wheeze or crackles.  I believe the soft tissue over the sternum is full, towards the R chest wall.  ABDOM: nontender and nondistended; soft;  no rebound/guarding, L/S NP  MUSC/Skeletal: ROM normal; no crepitus; joints normal; no deformities  EXTREM: no clubbing, cyanosis, inflammation   SKIN: no rashes.  : no CVAT.  NEURO: grossly intact; motor/sensory WNL; " no tremors  PSYCH: normal mood, affect and behavior  LYMPH: L inguinal LN      Radiology/Diagnostic Studies:    Mamm 1/2018 negative.  Bone marrow 2014, 25% cellularity.  IgG 2,000 now at 1862.  Hgb 9.5 WBC 4500, plt cnt 598,000.  Polyclonal gammopathy    CAT of chest mottled sternum, 2.2 axillary LN  Bone Scan multiple gilberto of uptake including sacrum.  CAT diffuse LN, sclerotic bone lesions  Mamm YONATHAN    Assessment:   (1) 46 y.o. female with chronic anemia secondary to decreased bone marrow cellularity.  Blood counts low but stable.      (2)Chronic LBP.  Fullness at R sternal area.  Abnormal Bone Scan, possible metastatic dx.    Discussed with radiologist.  Bx of L iliac LN showed granulomatous lymphadenopathy with noncaseating granuloma.  Differential diagnosis includes sarcoidosis, infectious lymphadenopathy, and non-Hodgkin's lymphoma.    Dr. Rendon for open biopsy of left inguinal lymph node.  Sample of the lymph node biopsy, not placed in formalin, for AFB stain and Gram stain and AFB C/S and routine C/S.  Flow cytometry studies on the lymph node biopsy for leukemia lymphoma phenotyping were negative.    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  Dr. Vick. Of ID has seen her and is treating her with antibiotics.  [facility] called, that she had AFB on C/S of R inguinal LN.    Appt with Dr. Adams, for eval and management for Sarcoid?  Appt with Dr. Malave, for review of LN C/S, AFB status.  Appt with JESSICA KOENIG to help get this arranged.    RTC 1 month.

## 2022-08-04 ENCOUNTER — OFFICE VISIT (OUTPATIENT)
Dept: HEMATOLOGY/ONCOLOGY | Facility: CLINIC | Age: 47
End: 2022-08-04
Payer: MEDICAID

## 2022-08-04 VITALS
SYSTOLIC BLOOD PRESSURE: 117 MMHG | HEIGHT: 68 IN | HEART RATE: 75 BPM | WEIGHT: 204 LBS | BODY MASS INDEX: 30.92 KG/M2 | DIASTOLIC BLOOD PRESSURE: 82 MMHG | TEMPERATURE: 98 F | RESPIRATION RATE: 18 BRPM

## 2022-08-04 DIAGNOSIS — G89.29 CHRONIC MIDLINE LOW BACK PAIN WITHOUT SCIATICA: ICD-10-CM

## 2022-08-04 DIAGNOSIS — D63.8 ANEMIA IN OTHER CHRONIC DISEASES CLASSIFIED ELSEWHERE: ICD-10-CM

## 2022-08-04 DIAGNOSIS — M54.50 CHRONIC MIDLINE LOW BACK PAIN WITHOUT SCIATICA: ICD-10-CM

## 2022-08-04 DIAGNOSIS — R93.7 ABNORMAL BONE XRAY: Primary | ICD-10-CM

## 2022-08-04 PROCEDURE — 3079F DIAST BP 80-89 MM HG: CPT | Mod: CPTII,S$GLB,, | Performed by: INTERNAL MEDICINE

## 2022-08-04 PROCEDURE — 3074F SYST BP LT 130 MM HG: CPT | Mod: CPTII,S$GLB,, | Performed by: INTERNAL MEDICINE

## 2022-08-04 PROCEDURE — 99214 PR OFFICE/OUTPT VISIT, EST, LEVL IV, 30-39 MIN: ICD-10-PCS | Mod: S$GLB,,, | Performed by: INTERNAL MEDICINE

## 2022-08-04 PROCEDURE — 1159F MED LIST DOCD IN RCRD: CPT | Mod: CPTII,S$GLB,, | Performed by: INTERNAL MEDICINE

## 2022-08-04 PROCEDURE — 3008F PR BODY MASS INDEX (BMI) DOCUMENTED: ICD-10-PCS | Mod: CPTII,S$GLB,, | Performed by: INTERNAL MEDICINE

## 2022-08-04 PROCEDURE — 3074F PR MOST RECENT SYSTOLIC BLOOD PRESSURE < 130 MM HG: ICD-10-PCS | Mod: CPTII,S$GLB,, | Performed by: INTERNAL MEDICINE

## 2022-08-04 PROCEDURE — 3079F PR MOST RECENT DIASTOLIC BLOOD PRESSURE 80-89 MM HG: ICD-10-PCS | Mod: CPTII,S$GLB,, | Performed by: INTERNAL MEDICINE

## 2022-08-04 PROCEDURE — 99214 OFFICE O/P EST MOD 30 MIN: CPT | Mod: S$GLB,,, | Performed by: INTERNAL MEDICINE

## 2022-08-04 PROCEDURE — 1159F PR MEDICATION LIST DOCUMENTED IN MEDICAL RECORD: ICD-10-PCS | Mod: CPTII,S$GLB,, | Performed by: INTERNAL MEDICINE

## 2022-08-04 PROCEDURE — 3008F BODY MASS INDEX DOCD: CPT | Mod: CPTII,S$GLB,, | Performed by: INTERNAL MEDICINE

## 2022-08-04 RX ORDER — HYDROCODONE BITARTRATE AND ACETAMINOPHEN 7.5; 325 MG/1; MG/1
1 TABLET ORAL EVERY 6 HOURS PRN
Qty: 120 TABLET | Refills: 0 | Status: SHIPPED | OUTPATIENT
Start: 2022-08-04 | End: 2022-09-26 | Stop reason: SDUPTHER

## 2022-08-04 NOTE — PROGRESS NOTES
To new PMD:  Ms. Potter could not remember your name but she has an appt. with you this pm.  Please see my note from today.  Complex problems?  I have tried to get her in to see Tanisha Adams and Ananda, but she is non compliant,  And appears to have missed several appts.  She RTC 1 month.  Thanks,   Paramjit Maldonado MD  [facility] Hematology Oncology In Office subsequent Encounter Note  8/4/22    Subjective:      Patient ID:   Robina Potter                                         46 y.o. female                                             1975      Chief Complaint:   Anemia, LBP    HPI:  46 y.o. female with anemia 2nd decreased cellularity of bone marrow.  Chronic low back pain sx +.  Applies heat with relief.  Admits to increased LBP sx, pain in hand and knee joints  with damp, cold weather of last few days.    She had percutaneous needle biopsy of left inguinal lymph node.  The pathology report showed granulomatous lymphadenitis with noncaseating granuloma.  Differential diagnosis included sarcoidosis, infection, and lymphoma.  Radiologist recommended open biopsy with additional tissue to rule out lymphoma.    I made a referral for her to see Dr. Rendon for open lymph node biopsy.  Specimen would be submitted for AFB and Gram stain,  AFB culture and routine culture and flow cytometry studies for leukemia lymphoma phenotyping submitted on a fresh lymph node biopsy,   not placed in formalin.  Also the lymph node biopsy would be submitted for routine H and E stain per pathology.    I spoke with Dr. Alba of pathology.  The flow cytometry studies did not show lymphoma.  Dr. Marcos will be issuing the final pathology report.  The culture from the lymph node biopsy returned 3+ positive.  Achromobacter xylosoxidans.  She may have systemic involvement with this organism with lymphadenopathy and bone involvement.  Will make a referral to Infectious Disease specialist to see how this will be  "treated?    She saw Dr. Vick of ID in [place].  He does not think this is TB.  He has given her antibiotics x's 14 days.  Overall pain sx are improved.    She lost her antibiotics, we called her pharmacist and reordered her bactrim BID for her.  We received call from [facility], that she has AFB on C/S of LN.    GERD +.  Pyelonephritis +.  Fibroids hx.  Recurrant UTI.  Cystoscopy done.    Seen for chest pain sx, CAT negative for PE.  Dr. Strong.  She has GI sx, on meds per Dr. Strong..  She saw Dr. Strong, may need hernia repair.  Saw Dr. Spence for migraine eval? Sx better.  Bladder eval negative.  Dr. Méndez.  She denies bladder sx now.    No recent URI or UTI sx.    Refill meds for pain control.    Smoke 1/3 ppd.  Etoh no.  Job, security worker.    Cholecystectomy +, M0.    Mom  SLE.  1 child cerebral palsy.      ROS:   GEN: normal without any fever, night sweats or weight loss  HEENT:  HA's better  CV: normal with no CP, SOB, PND, SIMON or orthopnea  PULM: See HPI.  GI: GERD.  See history of present illness.  : Pyelonephritis hx. SEE HPI.  BREAST: normal with no mass, discharge, pain  SKIN: normal with no rash, erythema.    Review of patient's allergies indicates:  No Known Allergies    Current Outpatient Medications:     HYDROcodone-acetaminophen (NORCO) 7.5-325 mg per tablet, Take 1 tablet by mouth every 6 (six) hours as needed for Pain., Disp: 120 tablet, Rfl: 0          Objective:   Vitals:  Blood pressure 117/82, pulse 75, temperature 97.6 °F (36.4 °C), resp. rate 18, height 5' 8" (1.727 m), weight 92.5 kg (204 lb).    Physical Examination:   GEN: no apparent distress, comfortable  HEAD: atraumatic and normocephalic  EYES: no pallor, no icterus  ENT: no pharyngeal erythema, external ears WNL; no nasal discharge  NECK: no masses, thyroid normal, trachea midline, no LAD/LN's, supple  CV: RRR with no murmur; normal pulse; normal S1 and S2; no pedal edema  CHEST: Normal respiratory effort; CTAB;  no " wheeze or crackles.  I believe the soft tissue over the sternum is full, towards the R chest wall.  ABDOM: nontender and nondistended; soft;  no rebound/guarding, L/S NP  MUSC/Skeletal: ROM normal; no crepitus; joints normal; no deformities  EXTREM: no clubbing, cyanosis, inflammation   SKIN: no rashes.  : no CVAT.  NEURO: grossly intact; motor/sensory WNL; no tremors  PSYCH: normal mood, affect and behavior  LYMPH: L inguinal LN      Radiology/Diagnostic Studies:    Mamm 1/2018 negative.  Bone marrow 2014, 25% cellularity.  IgG 2,000 now at 1862.  Hgb 9.5 WBC 4500, plt cnt 598,000.  Polyclonal gammopathy    CAT of chest mottled sternum, 2.2 axillary LN  Bone Scan multiple gilberto of uptake including sacrum.  CAT diffuse LN, sclerotic bone lesions  Mamm YONATHAN    Assessment:   (1) 46 y.o. female with chronic anemia secondary to decreased bone marrow cellularity.  Blood counts low but stable.      (2)Chronic LBP.  Fullness at R sternal area.  Abnormal Bone Scan, possible metastatic dx.    Discussed with radiologist.  Bx of L iliac LN showed granulomatous lymphadenopathy with noncaseating granuloma.  Differential diagnosis includes sarcoidosis, infectious lymphadenopathy, and non-Hodgkin's lymphoma.    Dr. Rendon for open biopsy of left inguinal lymph node.  Sample of the lymph node biopsy, not placed in formalin, for AFB stain and Gram stain and AFB C/S and routine C/S.  Flow cytometry studies on the lymph node biopsy for leukemia lymphoma phenotyping were negative.    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  Dr. Vick. Of ID has seen her and is treating her with antibiotics.  [facility] called, that she had AFB on C/S of R inguinal LN.    Appt with Dr. Adams, for eval and management for Sarcoid?  Appt with Dr. Malave, for review of LN C/S, AFB status.  Appt with JESSICA KOENIG to help get this arranged.    RTC 1 month.

## 2022-09-26 ENCOUNTER — OFFICE VISIT (OUTPATIENT)
Dept: HEMATOLOGY/ONCOLOGY | Facility: CLINIC | Age: 47
End: 2022-09-26
Payer: MEDICAID

## 2022-09-26 VITALS
HEIGHT: 68 IN | WEIGHT: 208 LBS | DIASTOLIC BLOOD PRESSURE: 73 MMHG | HEART RATE: 74 BPM | BODY MASS INDEX: 31.52 KG/M2 | SYSTOLIC BLOOD PRESSURE: 117 MMHG | TEMPERATURE: 98 F | RESPIRATION RATE: 18 BRPM

## 2022-09-26 DIAGNOSIS — D53.9 ANEMIA ASSOCIATED WITH NUTRITIONAL DEFICIENCY: ICD-10-CM

## 2022-09-26 DIAGNOSIS — D47.2 MGUS (MONOCLONAL GAMMOPATHY OF UNKNOWN SIGNIFICANCE): ICD-10-CM

## 2022-09-26 DIAGNOSIS — G89.29 CHRONIC MIDLINE LOW BACK PAIN WITHOUT SCIATICA: ICD-10-CM

## 2022-09-26 DIAGNOSIS — D63.8 ANEMIA IN OTHER CHRONIC DISEASES CLASSIFIED ELSEWHERE: Primary | ICD-10-CM

## 2022-09-26 DIAGNOSIS — M54.50 CHRONIC MIDLINE LOW BACK PAIN WITHOUT SCIATICA: ICD-10-CM

## 2022-09-26 DIAGNOSIS — N30.00 ACUTE CYSTITIS WITHOUT HEMATURIA: ICD-10-CM

## 2022-09-26 DIAGNOSIS — R93.7 ABNORMAL BONE XRAY: ICD-10-CM

## 2022-09-26 DIAGNOSIS — E07.9 THYROID DISORDER: ICD-10-CM

## 2022-09-26 PROCEDURE — 3074F PR MOST RECENT SYSTOLIC BLOOD PRESSURE < 130 MM HG: ICD-10-PCS | Mod: CPTII,S$GLB,, | Performed by: INTERNAL MEDICINE

## 2022-09-26 PROCEDURE — 1159F PR MEDICATION LIST DOCUMENTED IN MEDICAL RECORD: ICD-10-PCS | Mod: CPTII,S$GLB,, | Performed by: INTERNAL MEDICINE

## 2022-09-26 PROCEDURE — 99213 OFFICE O/P EST LOW 20 MIN: CPT | Mod: S$GLB,,, | Performed by: INTERNAL MEDICINE

## 2022-09-26 PROCEDURE — 3074F SYST BP LT 130 MM HG: CPT | Mod: CPTII,S$GLB,, | Performed by: INTERNAL MEDICINE

## 2022-09-26 PROCEDURE — 3008F BODY MASS INDEX DOCD: CPT | Mod: CPTII,S$GLB,, | Performed by: INTERNAL MEDICINE

## 2022-09-26 PROCEDURE — 1159F MED LIST DOCD IN RCRD: CPT | Mod: CPTII,S$GLB,, | Performed by: INTERNAL MEDICINE

## 2022-09-26 PROCEDURE — 3008F PR BODY MASS INDEX (BMI) DOCUMENTED: ICD-10-PCS | Mod: CPTII,S$GLB,, | Performed by: INTERNAL MEDICINE

## 2022-09-26 PROCEDURE — 3078F PR MOST RECENT DIASTOLIC BLOOD PRESSURE < 80 MM HG: ICD-10-PCS | Mod: CPTII,S$GLB,, | Performed by: INTERNAL MEDICINE

## 2022-09-26 PROCEDURE — 99213 PR OFFICE/OUTPT VISIT, EST, LEVL III, 20-29 MIN: ICD-10-PCS | Mod: S$GLB,,, | Performed by: INTERNAL MEDICINE

## 2022-09-26 PROCEDURE — 3078F DIAST BP <80 MM HG: CPT | Mod: CPTII,S$GLB,, | Performed by: INTERNAL MEDICINE

## 2022-09-26 RX ORDER — HYDROCODONE BITARTRATE AND ACETAMINOPHEN 7.5; 325 MG/1; MG/1
1 TABLET ORAL EVERY 6 HOURS PRN
Qty: 120 TABLET | Refills: 0 | Status: SHIPPED | OUTPATIENT
Start: 2022-09-26 | End: 2022-11-10 | Stop reason: SDUPTHER

## 2022-09-26 RX ORDER — NITROFURANTOIN 25; 75 MG/1; MG/1
100 CAPSULE ORAL 2 TIMES DAILY
Qty: 14 CAPSULE | Refills: 0 | Status: SHIPPED | OUTPATIENT
Start: 2022-09-26 | End: 2022-10-03

## 2022-09-27 NOTE — PROGRESS NOTES
[facility] Hematology Oncology In Office subsequent Encounter Note  9/26/22/22    Subjective:      Patient ID:   Robina Potter                                         47 y.o. female                                             1975  Sergey Velázquez MD    Chief Complaint:   Anemia, LBP    HPI:  47 y.o. female with anemia 2nd decreased cellularity of bone marrow.  Chronic low back pain sx +.  Applies heat with relief.  Admits to increased LBP sx, pain in hand and knee joints  with damp, cold weather of last few days.    She had percutaneous needle biopsy of left inguinal lymph node.  The pathology report showed granulomatous lymphadenitis with noncaseating granuloma.  Differential diagnosis included sarcoidosis, infection, and lymphoma.  Radiologist recommended open biopsy with additional tissue to rule out lymphoma.    I made a referral for her to see Dr. Rendon for open lymph node biopsy.  Specimen would be submitted for AFB and Gram stain,  AFB culture and routine culture and flow cytometry studies for leukemia lymphoma phenotyping submitted on a fresh lymph node biopsy,   not placed in formalin.  Also the lymph node biopsy would be submitted for routine H and E stain per pathology.    I spoke with Dr. Alba of pathology.  The flow cytometry studies did not show lymphoma.  Dr. Marcos will be issuing the final pathology report.  The culture from the lymph node biopsy returned 3+ positive.  Achromobacter xylosoxidans.  She may have systemic involvement with this organism with lymphadenopathy and bone involvement.  Will make a referral to Infectious Disease specialist to see how this will be treated?    She saw Dr. Vick of ID in [place].  He does not think this is TB.  He has given her antibiotics x's 14 days.  Overall pain sx are improved.    She lost her antibiotics, we called her pharmacist and reordered her bactrim BID for her.  We received call from [facility], that she has AFB on C/S of  "LN.    GERD +.  Pyelonephritis +.  Fibroids hx.  Recurrant UTI.  Cystoscopy done.    Seen for chest pain sx, CAT negative for PE.  Dr. Strong.  She has GI sx, on meds per Dr. Strong..  She saw Dr. Strong, may need hernia repair.  Saw Dr. Spence for migraine eval? Sx better.  Bladder eval negative.  Dr. Méndez.  She denies bladder sx now.    No recent URI or UTI sx.    Refill meds for pain control.    Smoke 1/3 ppd.  Etoh no.  Job, security worker.    Cholecystectomy +, M0.    Mom  SLE.  1 child cerebral palsy.      ROS:   GEN: normal without any fever, night sweats or weight loss  HEENT:  HA's better  CV: normal with no CP, SOB, PND, SIMON or orthopnea  PULM: See HPI.  GI: GERD.  See history of present illness.  : Pyelonephritis hx. SEE HPI.  BREAST: normal with no mass, discharge, pain  SKIN: normal with no rash, erythema.    Review of patient's allergies indicates:  No Known Allergies    Current Outpatient Medications:     HYDROcodone-acetaminophen (NORCO) 7.5-325 mg per tablet, Take 1 tablet by mouth every 6 (six) hours as needed for Pain., Disp: 120 tablet, Rfl: 0    nitrofurantoin, macrocrystal-monohydrate, (MACROBID) 100 MG capsule, Take 1 capsule (100 mg total) by mouth 2 (two) times daily. for 7 days, Disp: 14 capsule, Rfl: 0          Objective:   Vitals:  Blood pressure 117/73, pulse 74, temperature 97.8 °F (36.6 °C), resp. rate 18, height 5' 8" (1.727 m), weight 94.3 kg (208 lb).    Physical Examination:   GEN: no apparent distress, comfortable  HEAD: atraumatic and normocephalic  EYES: no pallor, no icterus  ENT: no pharyngeal erythema, external ears WNL; no nasal discharge  NECK: no masses, thyroid normal, trachea midline, no LAD/LN's, supple  CV: RRR with no murmur; normal pulse; normal S1 and S2; no pedal edema  CHEST: Normal respiratory effort; CTAB;  no wheeze or crackles.  I believe the soft tissue over the sternum is full, towards the R chest wall.  ABDOM: nontender and nondistended; soft;  no " rebound/guarding, L/S NP  MUSC/Skeletal: ROM normal; no crepitus; joints normal; no deformities  EXTREM: no clubbing, cyanosis, inflammation   SKIN: no rashes.  : no CVAT.  NEURO: grossly intact; motor/sensory WNL; no tremors  PSYCH: normal mood, affect and behavior  LYMPH: L inguinal LN      Radiology/Diagnostic Studies:    Mamm 1/2018 negative.  Bone marrow 2014, 25% cellularity.  IgG 2,000 now at 1862.  Hgb 9.5 WBC 4500, plt cnt 598,000.  Polyclonal gammopathy    CAT of chest mottled sternum, 2.2 axillary LN  Bone Scan multiple gilberto of uptake including sacrum.  CAT diffuse LN, sclerotic bone lesions  Mamm YONATHAN    Assessment:   (1) 47 y.o. female with chronic anemia secondary to decreased bone marrow cellularity.  Blood counts low but stable.      (2)Chronic LBP.  Fullness at R sternal area.  Abnormal Bone Scan, possible metastatic dx.    Discussed with radiologist.  Bx of L iliac LN showed granulomatous lymphadenopathy with noncaseating granuloma.  Differential diagnosis includes sarcoidosis, infectious lymphadenopathy, and non-Hodgkin's lymphoma.    Dr. Rendon for open biopsy of left inguinal lymph node.  Sample of the lymph node biopsy, not placed in formalin, for AFB stain and Gram stain and AFB C/S and routine C/S.  Flow cytometry studies on the lymph node biopsy for leukemia lymphoma phenotyping were negative.    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  Dr. Vick. Of ID has seen her and is treating her with antibiotics.  [facility] called, that she had AFB on C/S of R inguinal LN.    Appt with Dr. Adams, for eval and management for Sarcoid?  Appt with Dr. Malave, for review of LN C/S, AFB status.  Appt with PMD, Sergey Velázquez MD    Lab eval. Ordered for Anemia status?    RTC 1 month.

## 2022-10-27 ENCOUNTER — HOSPITAL ENCOUNTER (EMERGENCY)
Facility: HOSPITAL | Age: 47
Discharge: HOME | End: 2022-10-27
Attending: EMERGENCY MEDICINE
Payer: MEDICAID

## 2022-10-27 VITALS
OXYGEN SATURATION: 99 % | WEIGHT: 210 LBS | DIASTOLIC BLOOD PRESSURE: 76 MMHG | HEIGHT: 68 IN | SYSTOLIC BLOOD PRESSURE: 145 MMHG | BODY MASS INDEX: 31.83 KG/M2 | HEART RATE: 61 BPM | RESPIRATION RATE: 17 BRPM | TEMPERATURE: 98 F

## 2022-10-27 DIAGNOSIS — M79.89 LEG SWELLING: ICD-10-CM

## 2022-10-27 DIAGNOSIS — M25.571 RIGHT ANKLE PAIN, UNSPECIFIED CHRONICITY: Primary | ICD-10-CM

## 2022-10-27 DIAGNOSIS — R52 PAIN: ICD-10-CM

## 2022-10-27 DIAGNOSIS — R53.83 FATIGUE: ICD-10-CM

## 2022-10-27 DIAGNOSIS — N89.8 VAGINAL DISCHARGE: ICD-10-CM

## 2022-10-27 DIAGNOSIS — M25.571 ARTHRALGIA OF RIGHT ANKLE: ICD-10-CM

## 2022-10-27 DIAGNOSIS — R31.9 URINARY TRACT INFECTION WITH HEMATURIA, SITE UNSPECIFIED: ICD-10-CM

## 2022-10-27 DIAGNOSIS — N39.0 URINARY TRACT INFECTION WITH HEMATURIA, SITE UNSPECIFIED: ICD-10-CM

## 2022-10-27 DIAGNOSIS — R60.9 SWELLING: ICD-10-CM

## 2022-10-27 LAB
ALBUMIN SERPL BCP-MCNC: 4.6 G/DL (ref 3.5–5.2)
ALP SERPL-CCNC: 70 U/L (ref 55–135)
ALT SERPL W/O P-5'-P-CCNC: 14 U/L (ref 10–44)
ANION GAP SERPL CALC-SCNC: 7 MMOL/L (ref 8–16)
AST SERPL-CCNC: 34 U/L (ref 10–40)
B-HCG UR QL: NEGATIVE
BACTERIA #/AREA URNS HPF: NEGATIVE /HPF
BACTERIA GENITAL QL WET PREP: ABNORMAL
BASOPHILS # BLD AUTO: 0.05 K/UL (ref 0–0.2)
BASOPHILS NFR BLD: 1 % (ref 0–1.9)
BILIRUB SERPL-MCNC: 0.7 MG/DL (ref 0.1–1)
BILIRUB UR QL STRIP: NEGATIVE
BUN SERPL-MCNC: 21 MG/DL (ref 6–20)
CALCIUM SERPL-MCNC: 9.7 MG/DL (ref 8.7–10.5)
CHLORIDE SERPL-SCNC: 99 MMOL/L (ref 95–110)
CLARITY UR: CLEAR
CLUE CELLS VAG QL WET PREP: ABNORMAL
CO2 SERPL-SCNC: 28 MMOL/L (ref 23–29)
COLOR UR: YELLOW
CREAT SERPL-MCNC: 1.2 MG/DL (ref 0.5–1.4)
CTP QC/QA: YES
DIFFERENTIAL METHOD: ABNORMAL
EOSINOPHIL # BLD AUTO: 0.2 K/UL (ref 0–0.5)
EOSINOPHIL NFR BLD: 3.3 % (ref 0–8)
ERYTHROCYTE [DISTWIDTH] IN BLOOD BY AUTOMATED COUNT: 12.6 % (ref 11.5–14.5)
EST. GFR  (NO RACE VARIABLE): 55.8 ML/MIN/1.73 M^2
FILAMENT FUNGI VAG WET PREP-#/AREA: ABNORMAL
GLUCOSE SERPL-MCNC: 84 MG/DL (ref 70–110)
GLUCOSE UR QL STRIP: NEGATIVE
HCT VFR BLD AUTO: 31.9 % (ref 37–48.5)
HGB BLD-MCNC: 10.4 G/DL (ref 12–16)
HGB UR QL STRIP: ABNORMAL
HYALINE CASTS #/AREA URNS LPF: 2 /LPF
IMM GRANULOCYTES # BLD AUTO: 0.01 K/UL (ref 0–0.04)
IMM GRANULOCYTES NFR BLD AUTO: 0.2 % (ref 0–0.5)
KETONES UR QL STRIP: NEGATIVE
LEUKOCYTE ESTERASE UR QL STRIP: ABNORMAL
LIPASE SERPL-CCNC: 41 U/L (ref 4–60)
LYMPHOCYTES # BLD AUTO: 1.7 K/UL (ref 1–4.8)
LYMPHOCYTES NFR BLD: 32.9 % (ref 18–48)
MCH RBC QN AUTO: 30.9 PG (ref 27–31)
MCHC RBC AUTO-ENTMCNC: 32.6 G/DL (ref 32–36)
MCV RBC AUTO: 95 FL (ref 82–98)
MICROSCOPIC COMMENT: ABNORMAL
MONOCYTES # BLD AUTO: 0.5 K/UL (ref 0.3–1)
MONOCYTES NFR BLD: 8.9 % (ref 4–15)
NEUTROPHILS # BLD AUTO: 2.8 K/UL (ref 1.8–7.7)
NEUTROPHILS NFR BLD: 53.7 % (ref 38–73)
NITRITE UR QL STRIP: NEGATIVE
NRBC BLD-RTO: 0 /100 WBC
PH UR STRIP: 6 [PH] (ref 5–8)
PLATELET # BLD AUTO: 316 K/UL (ref 150–450)
PMV BLD AUTO: 8.9 FL (ref 9.2–12.9)
POTASSIUM SERPL-SCNC: 3.9 MMOL/L (ref 3.5–5.1)
PROT SERPL-MCNC: 9.7 G/DL (ref 6–8.4)
PROT UR QL STRIP: ABNORMAL
RBC # BLD AUTO: 3.37 M/UL (ref 4–5.4)
RBC #/AREA URNS HPF: 5 /HPF (ref 0–4)
SODIUM SERPL-SCNC: 134 MMOL/L (ref 136–145)
SP GR UR STRIP: 1.01 (ref 1–1.03)
SPECIMEN SOURCE: ABNORMAL
SQUAMOUS #/AREA URNS HPF: 1 /HPF
T VAGINALIS GENITAL QL WET PREP: ABNORMAL
URN SPEC COLLECT METH UR: ABNORMAL
UROBILINOGEN UR STRIP-ACNC: NEGATIVE EU/DL
WBC # BLD AUTO: 5.19 K/UL (ref 3.9–12.7)
WBC #/AREA URNS HPF: 6 /HPF (ref 0–5)
WBC #/AREA VAG WET PREP: ABNORMAL
YEAST GENITAL QL WET PREP: ABNORMAL

## 2022-10-27 PROCEDURE — 81025 URINE PREGNANCY TEST: CPT | Performed by: NURSE PRACTITIONER

## 2022-10-27 PROCEDURE — 83690 ASSAY OF LIPASE: CPT | Performed by: NURSE PRACTITIONER

## 2022-10-27 PROCEDURE — 87591 N.GONORRHOEAE DNA AMP PROB: CPT | Performed by: NURSE PRACTITIONER

## 2022-10-27 PROCEDURE — 87086 URINE CULTURE/COLONY COUNT: CPT | Performed by: NURSE PRACTITIONER

## 2022-10-27 PROCEDURE — 99285 EMERGENCY DEPT VISIT HI MDM: CPT | Mod: 25

## 2022-10-27 PROCEDURE — 81001 URINALYSIS AUTO W/SCOPE: CPT | Performed by: NURSE PRACTITIONER

## 2022-10-27 PROCEDURE — 80053 COMPREHEN METABOLIC PANEL: CPT | Performed by: NURSE PRACTITIONER

## 2022-10-27 PROCEDURE — 85025 COMPLETE CBC W/AUTO DIFF WBC: CPT | Performed by: NURSE PRACTITIONER

## 2022-10-27 PROCEDURE — 87210 SMEAR WET MOUNT SALINE/INK: CPT | Performed by: NURSE PRACTITIONER

## 2022-10-27 PROCEDURE — 87491 CHLMYD TRACH DNA AMP PROBE: CPT | Performed by: NURSE PRACTITIONER

## 2022-10-27 RX ORDER — CEFTRIAXONE 1 G/1
500 INJECTION, POWDER, FOR SOLUTION INTRAMUSCULAR; INTRAVENOUS
Status: DISCONTINUED | OUTPATIENT
Start: 2022-10-27 | End: 2022-10-27

## 2022-10-27 RX ORDER — MELOXICAM 7.5 MG/1
7.5 TABLET ORAL DAILY
Qty: 10 TABLET | Refills: 0 | Status: SHIPPED | OUTPATIENT
Start: 2022-10-27 | End: 2022-11-06

## 2022-10-27 RX ORDER — CEPHALEXIN 500 MG/1
500 CAPSULE ORAL 4 TIMES DAILY
Qty: 20 CAPSULE | Refills: 0 | Status: SHIPPED | OUTPATIENT
Start: 2022-10-27 | End: 2022-11-01

## 2022-10-27 RX ORDER — AZITHROMYCIN 250 MG/1
1000 TABLET, FILM COATED ORAL
Status: DISCONTINUED | OUTPATIENT
Start: 2022-10-27 | End: 2022-10-27

## 2022-10-27 RX ORDER — DICLOFENAC SODIUM 10 MG/G
2 GEL TOPICAL 4 TIMES DAILY
Qty: 350 G | Refills: 0 | Status: SHIPPED | OUTPATIENT
Start: 2022-10-27

## 2022-10-27 NOTE — Clinical Note
"Robina "Robina"Tariq was seen and treated in our emergency department on 10/27/2022.  She may return to work on 10/31/2022.       If you have any questions or concerns, please don't hesitate to call.      Essence Tomas NP"

## 2022-10-27 NOTE — ED PROVIDER NOTES
"Encounter Date: 10/27/2022       History     Chief Complaint   Patient presents with    Joint Swelling     RT ANKLE X "JOSELUISWHILE" X 2 MOS     47-year-old female with a history of chronic anemia chronic pain from a arthritis which she is on chronic pain management by her oncologist hematologist Dr. Laine christensen, presents to the ER today due to ongoing right lateral ankle swelling and pain for the past 2 months that has gradually intensified and now extending to involve her entire right leg.  She states swelling is only noted to her right lower leg and ankle but pain radiates from her right lateral ankle all the way up into her right hip.  No injury or trauma to precipitate her symptoms she is able to walk but has an antalgic gait.  No redness or warmth.  She has good sensation to distal tips of all toes.  Additionally she states she is here because she has suprapubic discomfort, right flank pain, and vaginal discharge and concerns she has a UTI.  She denies any fever.  No nausea vomiting symptoms.  She states her vaginal discharge is white/clear in color.  She denies being sexually active in many years.  Overall patient is a fairly poor historian and being able to get the HPI from patient was very difficult.  Patient states she ran out of her pain medication 2 days ago.        [place]  [facility] was accessed to evaluate pt controlled rx:     Norco 7.5 last prescribed 09/30/2022 Quantity #120 tablets       Review of patient's allergies indicates:   Allergen Reactions    Morphine Itching     Past Medical History:   Diagnosis Date    Anemia     Back pain without sciatica     Thyroid disease      Past Surgical History:   Procedure Laterality Date    CHOLECYSTECTOMY       No family history on file.  Social History     Tobacco Use    Smoking status: Every Day    Smokeless tobacco: Never   Substance Use Topics    Alcohol use: No    Drug use: No     Review of Systems    Physical Exam     Initial Vitals [10/27/22 1002]   BP Pulse Resp " Temp SpO2   100/65 75 18 98.3 °F (36.8 °C) 98 %      MAP       --         Physical Exam    ED Course   Procedures  Labs Reviewed   VAGINAL SCREEN - Abnormal; Notable for the following components:       Result Value    WBC - Vaginal Screen Few (*)     Bacteria - Vaginal Screen Few (*)     All other components within normal limits    Narrative:     Release to patient->Immediate   URINALYSIS, REFLEX TO URINE CULTURE - Abnormal; Notable for the following components:    Protein, UA Trace (*)     Occult Blood UA 3+ (*)     Leukocytes, UA 1+ (*)     All other components within normal limits    Narrative:     Specimen Source->Urine   CBC W/ AUTO DIFFERENTIAL - Abnormal; Notable for the following components:    RBC 3.37 (*)     Hemoglobin 10.4 (*)     Hematocrit 31.9 (*)     MPV 8.9 (*)     All other components within normal limits   COMPREHENSIVE METABOLIC PANEL - Abnormal; Notable for the following components:    Sodium 134 (*)     BUN 21 (*)     Total Protein 9.7 (*)     Anion Gap 7 (*)     eGFR 55.8 (*)     All other components within normal limits   URINALYSIS MICROSCOPIC - Abnormal; Notable for the following components:    RBC, UA 5 (*)     WBC, UA 6 (*)     Hyaline Casts, UA 2 (*)     All other components within normal limits    Narrative:     Specimen Source->Urine   CULTURE, URINE   C. TRACHOMATIS/N. GONORRHOEAE BY AMP DNA   CULTURE, URINE   LIPASE   POCT URINE PREGNANCY          Imaging Results              X-Ray Femur Ap/Lat Right (Final result)  Result time 10/27/22 14:26:16      Final result by Pradeep Titus MD (10/27/22 14:26:16)                   Narrative:    Right femur 2 views    CLINICAL DATA: Joint swelling    FINDINGS: AP and lateral views (5 images) are submitted. There is no radiographic evidence of fracture or osseous destructive lesion. Alignment at the right knee and hip is normal. No significant joint effusion is identified at the knee.    Mild patellofemoral arthritic change is  noted.    IMPRESSION:  1. Normal radiographic appearance of the right femur.  2. Mild patellofemoral arthritic change.    Electronically signed by:  Pradeep Titus MD  10/27/2022 2:26 PM CDT Workstation: [id number]                                     X-Ray Tibia Fibula 2 View Right (Final result)  Result time 10/27/22 13:19:35      Final result by Donell Del Valle MD (10/27/22 13:19:35)                   Narrative:    HISTORY: Right lower leg swelling.    FINDINGS: 2 views of the right tibia and fibula obtained in 4 images show no acute fracture, dislocation or destructive osseous lesion. Bone mineralization is normal.    IMPRESSION: Negative right tibia and fibula radiographs.    Electronically signed by:  Donell Del Valle MD  10/27/2022 1:19 PM CDT Workstation: [id number]                                     X-Ray Chest 1 View (Final result)  Result time 10/27/22 12:58:00      Final result by Pradeep Titus MD (10/27/22 12:58:00)                   Narrative:    Chest single view    Clinical data:Fatigue. Comparison to January 1.    FINDINGS: AP view of the chest demonstrates no cardiac, pulmonary, or osseous abnormalities.    IMPRESSION:  1. Normal chest single view.    Electronically signed by:  Pradeep Titus MD  10/27/2022 12:58 PM CDT Workstation: [id number]                                     US Lower Extremity Veins Right (Final result)  Result time 10/27/22 15:16:29      Final result by Negro Murillo MD (10/27/22 15:16:29)                   Narrative:    US LOWER EXTREMITY VEINS LIMITED FOLLOW-UP UNILATERAL    CLINICAL HISTORY:  47 years Female right lower extremity swelling    FINDINGS: Grayscale, color and spectral Doppler analysis of the right lower extremity deep venous system was performed.    There is normal compressibility, with normal flow by color and spectral Doppler analysis in the right lower extremity deep venous system, with normal augmentation and no evidence of deep venous  thrombosis.    IMPRESSION: Negative for DVT.    Electronically signed by:  Negro Murillo MD  10/27/2022 3:16 PM CDT Workstation: [id number]                                     X-Ray Ankle Complete Right (Final result)  Result time 10/27/22 11:19:04      Final result by Donell Del Valle MD (10/27/22 11:19:04)                   Narrative:    HISTORY: Right ankle pain and swelling.    FINDINGS: 3 views of the right ankle show no acute fracture, dislocation or destructive osseous lesion. The ankle mortise is intact, with the joint spaces preserved. Bone mineralization is normal.    IMPRESSION: Negative right ankle radiographs.    Electronically signed by:  Donell Del Valle MD  10/27/2022 11:19 AM CDT Workstation: [id number]                                     Medications - No data to display                           Clinical Impression:   Final diagnoses:  [R60.9] Swelling  [R53.83] Fatigue  [R52] Pain  [M25.571] Right ankle pain, unspecified chronicity (Primary)  [M79.89] Leg swelling  [M25.571] Arthralgia of right ankle  [N39.0, R31.9] Urinary tract infection with hematuria, site unspecified  [N89.8] Vaginal discharge        ED Disposition Condition    Discharge Stable          ED Prescriptions       Medication Sig Dispense Start Date End Date Auth. Provider    cephALEXin (KEFLEX) 500 MG capsule Take 1 capsule (500 mg total) by mouth 4 (four) times daily. for 5 days 20 capsule 10/27/2022 11/1/2022 Essence Tomas NP    diclofenac sodium (VOLTAREN) 1 % Gel Apply 2 g topically 4 (four) times daily. 350 g 10/27/2022 -- Essence Tomas NP    meloxicam (MOBIC) 7.5 MG tablet Take 1 tablet (7.5 mg total) by mouth once daily. for 10 days 10 tablet 10/27/2022 11/6/2022 Essence Tomas NP          Follow-up Information       Follow up With Specialties Details Why Contact Info Additional Information    Gaetano Taylor MD Urology Schedule an appointment as soon as possible for a visit in 3 days for ER visit follow up and  re-evaluation, urology follow up , microscopic hematuria [address] [place] [address]  [address]  [facility] [address]  [phone]       Tiara Murillo MD Obstetrics, Obstetrics and Gynecology Schedule an appointment as soon as possible for a visit in 2 days for ER visit follow up and re-evaluation, OBGYN follow up [address]  [address]  [facility]  [facility] [address]  [phone]       SEAN Mccain MD Hematology, Oncology, Hematology and Oncology Schedule an appointment as soon as possible for a visit in 1 day for ER visit follow up and re-evaluation [address]  [address]  [facility] [address]  [phone]       [facility] - Emergency Dept Emergency Medicine Go to  As needed, If symptoms worsen [address] [address]  [phone] 1st floor             Essence Tomas NP  10/28/22 0123

## 2022-10-30 LAB
BACTERIA UR CULT: NO GROWTH
CHLAMYDIA, AMPLIFIED DNA: NEGATIVE
N GONORRHOEAE, AMPLIFIED DNA: NEGATIVE

## 2022-11-10 ENCOUNTER — OFFICE VISIT (OUTPATIENT)
Dept: HEMATOLOGY/ONCOLOGY | Facility: CLINIC | Age: 47
End: 2022-11-10
Payer: MEDICAID

## 2022-11-10 VITALS
BODY MASS INDEX: 31.64 KG/M2 | WEIGHT: 208.81 LBS | DIASTOLIC BLOOD PRESSURE: 73 MMHG | HEART RATE: 78 BPM | RESPIRATION RATE: 16 BRPM | TEMPERATURE: 97 F | SYSTOLIC BLOOD PRESSURE: 119 MMHG | HEIGHT: 68 IN

## 2022-11-10 DIAGNOSIS — R93.7 ABNORMAL BONE XRAY: ICD-10-CM

## 2022-11-10 DIAGNOSIS — M54.50 CHRONIC MIDLINE LOW BACK PAIN WITHOUT SCIATICA: ICD-10-CM

## 2022-11-10 DIAGNOSIS — D53.9 NUTRITIONAL ANEMIA, UNSPECIFIED: ICD-10-CM

## 2022-11-10 DIAGNOSIS — G89.29 CHRONIC MIDLINE LOW BACK PAIN WITHOUT SCIATICA: ICD-10-CM

## 2022-11-10 DIAGNOSIS — D63.8 ANEMIA IN OTHER CHRONIC DISEASES CLASSIFIED ELSEWHERE: Primary | ICD-10-CM

## 2022-11-10 PROCEDURE — 3008F BODY MASS INDEX DOCD: CPT | Mod: CPTII,S$GLB,, | Performed by: INTERNAL MEDICINE

## 2022-11-10 PROCEDURE — 3078F PR MOST RECENT DIASTOLIC BLOOD PRESSURE < 80 MM HG: ICD-10-PCS | Mod: CPTII,S$GLB,, | Performed by: INTERNAL MEDICINE

## 2022-11-10 PROCEDURE — 99213 OFFICE O/P EST LOW 20 MIN: CPT | Mod: S$GLB,,, | Performed by: INTERNAL MEDICINE

## 2022-11-10 PROCEDURE — 3074F PR MOST RECENT SYSTOLIC BLOOD PRESSURE < 130 MM HG: ICD-10-PCS | Mod: CPTII,S$GLB,, | Performed by: INTERNAL MEDICINE

## 2022-11-10 PROCEDURE — 1159F MED LIST DOCD IN RCRD: CPT | Mod: CPTII,S$GLB,, | Performed by: INTERNAL MEDICINE

## 2022-11-10 PROCEDURE — 1159F PR MEDICATION LIST DOCUMENTED IN MEDICAL RECORD: ICD-10-PCS | Mod: CPTII,S$GLB,, | Performed by: INTERNAL MEDICINE

## 2022-11-10 PROCEDURE — 3078F DIAST BP <80 MM HG: CPT | Mod: CPTII,S$GLB,, | Performed by: INTERNAL MEDICINE

## 2022-11-10 PROCEDURE — 99213 PR OFFICE/OUTPT VISIT, EST, LEVL III, 20-29 MIN: ICD-10-PCS | Mod: S$GLB,,, | Performed by: INTERNAL MEDICINE

## 2022-11-10 PROCEDURE — 3074F SYST BP LT 130 MM HG: CPT | Mod: CPTII,S$GLB,, | Performed by: INTERNAL MEDICINE

## 2022-11-10 PROCEDURE — 3008F PR BODY MASS INDEX (BMI) DOCUMENTED: ICD-10-PCS | Mod: CPTII,S$GLB,, | Performed by: INTERNAL MEDICINE

## 2022-11-10 RX ORDER — MELOXICAM 7.5 MG/1
7.5 TABLET ORAL DAILY
COMMUNITY

## 2022-11-10 RX ORDER — HYDROCODONE BITARTRATE AND ACETAMINOPHEN 7.5; 325 MG/1; MG/1
1 TABLET ORAL EVERY 6 HOURS PRN
Qty: 120 TABLET | Refills: 0 | Status: SHIPPED | OUTPATIENT
Start: 2022-11-10 | End: 2022-12-21 | Stop reason: SDUPTHER

## 2022-11-10 NOTE — PROGRESS NOTES
[facility] Hematology Oncology In Office subsequent Encounter Note  11/10/22    Subjective:      Patient ID:   Robina Potter                                         47 y.o. female                                             1975  Sergey Velázquez MD    Chief Complaint:   Anemia, LBP    HPI:  47 y.o. female with anemia 2nd decreased cellularity of bone marrow.  Chronic low back pain sx +.  Applies heat with relief.  Admits to increased LBP sx, pain in hand and knee joints  with damp, cold weather of last few days.    She had percutaneous needle biopsy of left inguinal lymph node.  The pathology report showed granulomatous lymphadenitis with noncaseating granuloma.  Differential diagnosis included sarcoidosis, infection, and lymphoma.  Radiologist recommended open biopsy with additional tissue to rule out lymphoma.    I made a referral for her to see Dr. Rendon for open lymph node biopsy.  Specimen would be submitted for AFB and Gram stain,  AFB culture and routine culture and flow cytometry studies for leukemia lymphoma phenotyping submitted on a fresh lymph node biopsy,   not placed in formalin.  Also the lymph node biopsy would be submitted for routine H and E stain per pathology.    I spoke with Dr. Alba of pathology.  The flow cytometry studies did not show lymphoma.  Dr. Marcos will be issuing the final pathology report.  The culture from the lymph node biopsy returned 3+ positive.  Achromobacter xylosoxidans.  She may have systemic involvement with this organism with lymphadenopathy and bone involvement.  Will make a referral to Infectious Disease specialist to see how this will be treated?    She saw Dr. Vick of ID in [place].  He does not think this is TB.  He has given her antibiotics x's 14 days.  Overall pain sx are improved.    She lost her antibiotics, we called her pharmacist and reordered her bactrim BID for her.  We received call from [facility], that she has AFB on C/S of  "LN.    GERD +.  Pyelonephritis +.  Fibroids hx.  Recurrant UTI.  Cystoscopy done.    Seen for chest pain sx, CAT negative for PE.  Dr. Strong.  She has GI sx, on meds per Dr. Strong..  She saw Dr. Strong, may need hernia repair.  Saw Dr. Spence for migraine eval? Sx better.  Bladder eval negative.  Dr. Méndez.  She denies bladder sx now.    No recent URI or UTI sx.    Refill meds for pain control.    Smoke 1/3 ppd.  Etoh no.  Job, security worker.    Cholecystectomy +, M0.    Mom  SLE.  1 child cerebral palsy.      ROS:   GEN: normal without any fever, night sweats or weight loss  HEENT:  HA's better  CV: normal with no CP, SOB, PND, SIMON or orthopnea  PULM: See HPI.  GI: GERD.  See history of present illness.  : Pyelonephritis hx. SEE HPI.  BREAST: normal with no mass, discharge, pain  SKIN: normal with no rash, erythema.    Review of patient's allergies indicates:  No Known Allergies    Current Outpatient Medications:     diclofenac sodium (VOLTAREN) 1 % Gel, Apply 2 g topically 4 (four) times daily., Disp: 350 g, Rfl: 0    meloxicam (MOBIC) 7.5 MG tablet, Take 7.5 mg by mouth once daily., Disp: , Rfl:     HYDROcodone-acetaminophen (NORCO) 7.5-325 mg per tablet, Take 1 tablet by mouth every 6 (six) hours as needed for Pain., Disp: 120 tablet, Rfl: 0          Objective:   Vitals:  Blood pressure 119/73, pulse 78, temperature 97.4 °F (36.3 °C), resp. rate 16, height 5' 8" (1.727 m), weight 94.7 kg (208 lb 12.8 oz).    Physical Examination:   GEN: no apparent distress, comfortable  HEAD: atraumatic and normocephalic  EYES: no pallor, no icterus  ENT: no pharyngeal erythema, external ears WNL; no nasal discharge  NECK: no masses, thyroid normal, trachea midline, no LAD/LN's, supple  CV: RRR with no murmur; normal pulse; normal S1 and S2; no pedal edema  CHEST: Normal respiratory effort; CTAB;  no wheeze or crackles.  I believe the soft tissue over the sternum is full, towards the R chest wall.  ABDOM: nontender and " nondistended; soft;  no rebound/guarding, L/S NP  MUSC/Skeletal: ROM normal; no crepitus; joints normal; no deformities  EXTREM: no clubbing, cyanosis, inflammation   SKIN: no rashes.  : no CVAT.  NEURO: grossly intact; motor/sensory WNL; no tremors  PSYCH: normal mood, affect and behavior  LYMPH: L inguinal LN      Radiology/Diagnostic Studies:    Mamm 1/2018 negative.  Bone marrow 2014, 25% cellularity.  IgG 2,000 now at 1862.  Hgb 9.5 WBC 4500, plt cnt 598,000.  Polyclonal gammopathy    CAT of chest mottled sternum, 2.2 axillary LN  Bone Scan multiple gilberto of uptake including sacrum.  CAT diffuse LN, sclerotic bone lesions  Mamm YONATHAN    Assessment:   (1) 47 y.o. female with chronic anemia secondary to decreased bone marrow cellularity.  Blood counts low but stable.      (2)Chronic LBP.  Fullness at R sternal area.  Abnormal Bone Scan, possible metastatic dx.    Discussed with radiologist.  Bx of L iliac LN showed granulomatous lymphadenopathy with noncaseating granuloma.  Differential diagnosis includes sarcoidosis, infectious lymphadenopathy, and non-Hodgkin's lymphoma.    Dr. Rendon for open biopsy of left inguinal lymph node.  Sample of the lymph node biopsy, not placed in formalin, for AFB stain and Gram stain and AFB C/S and routine C/S.  Flow cytometry studies on the lymph node biopsy for leukemia lymphoma phenotyping were negative.    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  Dr. Vick. Of ID has seen her and is treating her with antibiotics.  [facility] called, that she had AFB on C/S of R inguinal LN.    Appt with Dr. Adams, for eval and management for Sarcoid?  Appt with Dr. Malave, for review of LN C/S, AFB status.  Appt with PMD, Sergey Velázquez MD    Lab eval. Ordered for Anemia status?    RTC 1 month.

## 2022-12-21 ENCOUNTER — LAB VISIT (OUTPATIENT)
Dept: LAB | Facility: HOSPITAL | Age: 47
End: 2022-12-21
Attending: INTERNAL MEDICINE
Payer: MEDICAID

## 2022-12-21 ENCOUNTER — OFFICE VISIT (OUTPATIENT)
Dept: HEMATOLOGY/ONCOLOGY | Facility: CLINIC | Age: 47
End: 2022-12-21
Payer: MEDICAID

## 2022-12-21 VITALS
DIASTOLIC BLOOD PRESSURE: 77 MMHG | BODY MASS INDEX: 30.05 KG/M2 | TEMPERATURE: 97 F | RESPIRATION RATE: 18 BRPM | HEART RATE: 86 BPM | WEIGHT: 198.31 LBS | SYSTOLIC BLOOD PRESSURE: 123 MMHG | HEIGHT: 68 IN

## 2022-12-21 DIAGNOSIS — D47.2 MGUS (MONOCLONAL GAMMOPATHY OF UNKNOWN SIGNIFICANCE): ICD-10-CM

## 2022-12-21 DIAGNOSIS — D63.8 ANEMIA IN OTHER CHRONIC DISEASES CLASSIFIED ELSEWHERE: ICD-10-CM

## 2022-12-21 DIAGNOSIS — M54.50 CHRONIC MIDLINE LOW BACK PAIN WITHOUT SCIATICA: ICD-10-CM

## 2022-12-21 DIAGNOSIS — D63.8 ANEMIA IN OTHER CHRONIC DISEASES CLASSIFIED ELSEWHERE: Primary | ICD-10-CM

## 2022-12-21 DIAGNOSIS — R93.7 ABNORMAL BONE XRAY: ICD-10-CM

## 2022-12-21 DIAGNOSIS — G89.29 CHRONIC MIDLINE LOW BACK PAIN WITHOUT SCIATICA: ICD-10-CM

## 2022-12-21 LAB
BASOPHILS # BLD AUTO: 0.07 K/UL (ref 0–0.2)
BASOPHILS NFR BLD: 1.6 % (ref 0–1.9)
DIFFERENTIAL METHOD: ABNORMAL
EOSINOPHIL # BLD AUTO: 0.2 K/UL (ref 0–0.5)
EOSINOPHIL NFR BLD: 5.5 % (ref 0–8)
ERYTHROCYTE [DISTWIDTH] IN BLOOD BY AUTOMATED COUNT: 13.2 % (ref 11.5–14.5)
FERRITIN SERPL-MCNC: 315 NG/ML (ref 20–300)
FOLATE SERPL-MCNC: 9.8 NG/ML (ref 4–24)
HCT VFR BLD AUTO: 33.6 % (ref 37–48.5)
HGB BLD-MCNC: 10.9 G/DL (ref 12–16)
IMM GRANULOCYTES # BLD AUTO: 0.01 K/UL (ref 0–0.04)
IMM GRANULOCYTES NFR BLD AUTO: 0.2 % (ref 0–0.5)
LYMPHOCYTES # BLD AUTO: 1.5 K/UL (ref 1–4.8)
LYMPHOCYTES NFR BLD: 35.6 % (ref 18–48)
MCH RBC QN AUTO: 30.4 PG (ref 27–31)
MCHC RBC AUTO-ENTMCNC: 32.4 G/DL (ref 32–36)
MCV RBC AUTO: 94 FL (ref 82–98)
MONOCYTES # BLD AUTO: 0.4 K/UL (ref 0.3–1)
MONOCYTES NFR BLD: 8.3 % (ref 4–15)
NEUTROPHILS # BLD AUTO: 2.1 K/UL (ref 1.8–7.7)
NEUTROPHILS NFR BLD: 48.8 % (ref 38–73)
NRBC BLD-RTO: 0 /100 WBC
PLATELET # BLD AUTO: 340 K/UL (ref 150–450)
PMV BLD AUTO: 8.5 FL (ref 9.2–12.9)
RBC # BLD AUTO: 3.58 M/UL (ref 4–5.4)
RETICS/RBC NFR AUTO: 1.3 % (ref 0.5–2.5)
VIT B12 SERPL-MCNC: 429 PG/ML (ref 210–950)
WBC # BLD AUTO: 4.33 K/UL (ref 3.9–12.7)

## 2022-12-21 PROCEDURE — 3008F PR BODY MASS INDEX (BMI) DOCUMENTED: ICD-10-PCS | Mod: CPTII,S$GLB,, | Performed by: INTERNAL MEDICINE

## 2022-12-21 PROCEDURE — 82784 ASSAY IGA/IGD/IGG/IGM EACH: CPT | Mod: 91 | Performed by: INTERNAL MEDICINE

## 2022-12-21 PROCEDURE — 99213 PR OFFICE/OUTPT VISIT, EST, LEVL III, 20-29 MIN: ICD-10-PCS | Mod: S$GLB,,, | Performed by: INTERNAL MEDICINE

## 2022-12-21 PROCEDURE — 82746 ASSAY OF FOLIC ACID SERUM: CPT | Performed by: INTERNAL MEDICINE

## 2022-12-21 PROCEDURE — 3078F DIAST BP <80 MM HG: CPT | Mod: CPTII,S$GLB,, | Performed by: INTERNAL MEDICINE

## 2022-12-21 PROCEDURE — 3078F PR MOST RECENT DIASTOLIC BLOOD PRESSURE < 80 MM HG: ICD-10-PCS | Mod: CPTII,S$GLB,, | Performed by: INTERNAL MEDICINE

## 2022-12-21 PROCEDURE — 3074F SYST BP LT 130 MM HG: CPT | Mod: CPTII,S$GLB,, | Performed by: INTERNAL MEDICINE

## 2022-12-21 PROCEDURE — 86334 IMMUNOFIX E-PHORESIS SERUM: CPT | Performed by: INTERNAL MEDICINE

## 2022-12-21 PROCEDURE — 99213 OFFICE O/P EST LOW 20 MIN: CPT | Mod: S$GLB,,, | Performed by: INTERNAL MEDICINE

## 2022-12-21 PROCEDURE — 84155 ASSAY OF PROTEIN SERUM: CPT | Performed by: INTERNAL MEDICINE

## 2022-12-21 PROCEDURE — 1159F PR MEDICATION LIST DOCUMENTED IN MEDICAL RECORD: ICD-10-PCS | Mod: CPTII,S$GLB,, | Performed by: INTERNAL MEDICINE

## 2022-12-21 PROCEDURE — 36415 COLL VENOUS BLD VENIPUNCTURE: CPT | Performed by: INTERNAL MEDICINE

## 2022-12-21 PROCEDURE — 82668 ASSAY OF ERYTHROPOIETIN: CPT | Performed by: INTERNAL MEDICINE

## 2022-12-21 PROCEDURE — 85045 AUTOMATED RETICULOCYTE COUNT: CPT | Performed by: INTERNAL MEDICINE

## 2022-12-21 PROCEDURE — 82728 ASSAY OF FERRITIN: CPT | Performed by: INTERNAL MEDICINE

## 2022-12-21 PROCEDURE — 85025 COMPLETE CBC W/AUTO DIFF WBC: CPT | Performed by: INTERNAL MEDICINE

## 2022-12-21 PROCEDURE — 82607 VITAMIN B-12: CPT | Performed by: INTERNAL MEDICINE

## 2022-12-21 PROCEDURE — 3074F PR MOST RECENT SYSTOLIC BLOOD PRESSURE < 130 MM HG: ICD-10-PCS | Mod: CPTII,S$GLB,, | Performed by: INTERNAL MEDICINE

## 2022-12-21 PROCEDURE — 1159F MED LIST DOCD IN RCRD: CPT | Mod: CPTII,S$GLB,, | Performed by: INTERNAL MEDICINE

## 2022-12-21 PROCEDURE — 3008F BODY MASS INDEX DOCD: CPT | Mod: CPTII,S$GLB,, | Performed by: INTERNAL MEDICINE

## 2022-12-21 RX ORDER — HYDROCODONE BITARTRATE AND ACETAMINOPHEN 7.5; 325 MG/1; MG/1
1 TABLET ORAL EVERY 6 HOURS PRN
Qty: 120 TABLET | Refills: 0 | Status: SHIPPED | OUTPATIENT
Start: 2022-12-21 | End: 2023-02-14

## 2022-12-22 NOTE — PROGRESS NOTES
[facility] Hematology Oncology In Office subsequent Encounter Note  12/21/22    Subjective:      Patient ID:   Robina Potter                                         47 y.o. female                                             1975  Sergey Velázquez MD    Chief Complaint:   Anemia, LBP    HPI:  47 y.o. female with anemia 2nd decreased cellularity of bone marrow.  Chronic low back pain sx +.  Applies heat with relief.  Admits to increased LBP sx, pain in hand and knee joints  with damp, cold weather of last few days.    She had percutaneous needle biopsy of left inguinal lymph node.  The pathology report showed granulomatous lymphadenitis with noncaseating granuloma.  Differential diagnosis included sarcoidosis, infection, and lymphoma.  Radiologist recommended open biopsy with additional tissue to rule out lymphoma.    I made a referral for her to see Dr. Rendon for open lymph node biopsy.  Specimen would be submitted for AFB and Gram stain,  AFB culture and routine culture and flow cytometry studies for leukemia lymphoma phenotyping submitted on a fresh lymph node biopsy,   not placed in formalin.  Also the lymph node biopsy would be submitted for routine H and E stain per pathology.    I spoke with Dr. Alba of pathology.  The flow cytometry studies did not show lymphoma.  Dr. Marcos will be issuing the final pathology report.  The culture from the lymph node biopsy returned 3+ positive.  Achromobacter xylosoxidans.  She may have systemic involvement with this organism with lymphadenopathy and bone involvement.  Will make a referral to Infectious Disease specialist to see how this will be treated?    She saw Dr. Vick of ID in [place].  He does not think this is TB.  He has given her antibiotics x's 14 days.  Overall pain sx are improved.    She lost her antibiotics, we called her pharmacist and reordered her bactrim BID for her.  We received call from [facility], that she has AFB on C/S of  "LN.    GERD +.  Pyelonephritis +.  Fibroids hx.  Recurrant UTI.  Cystoscopy done.    Seen for chest pain sx, CAT negative for PE.  Dr. Strong.  She has GI sx, on meds per Dr. Strong..  She saw Dr. Strong, may need hernia repair.  Saw Dr. Spence for migraine eval? Sx better.  Bladder eval negative.  Dr. Méndez.  She denies bladder sx now.    No recent URI or UTI sx.    Refill meds for pain control.    Smoke 1/3 ppd.  Etoh no.  Job, security worker.    Cholecystectomy +, M0.    Mom  SLE.  1 child cerebral palsy.      ROS:   GEN: normal without any fever, night sweats or weight loss  HEENT:  HA's better  CV: normal with no CP, SOB, PND, SIMON or orthopnea  PULM: See HPI.  GI: GERD.  See history of present illness.  : Pyelonephritis hx. SEE HPI.  BREAST: normal with no mass, discharge, pain  SKIN: normal with no rash, erythema.    Review of patient's allergies indicates:  No Known Allergies    Current Outpatient Medications:     diclofenac sodium (VOLTAREN) 1 % Gel, Apply 2 g topically 4 (four) times daily., Disp: 350 g, Rfl: 0    meloxicam (MOBIC) 7.5 MG tablet, Take 7.5 mg by mouth once daily., Disp: , Rfl:     HYDROcodone-acetaminophen (NORCO) 7.5-325 mg per tablet, Take 1 tablet by mouth every 6 (six) hours as needed for Pain., Disp: 120 tablet, Rfl: 0          Objective:   Vitals:  Blood pressure 123/77, pulse 86, temperature 97.4 °F (36.3 °C), resp. rate 18, height 5' 8" (1.727 m), weight 89.9 kg (198 lb 4.8 oz).    Physical Examination:   GEN: no apparent distress, comfortable  HEAD: atraumatic and normocephalic  EYES: no pallor, no icterus  ENT: no pharyngeal erythema, external ears WNL; no nasal discharge  NECK: no masses, thyroid normal, trachea midline, no LAD/LN's, supple  CV: RRR with no murmur; normal pulse; normal S1 and S2; no pedal edema  CHEST: Normal respiratory effort; CTAB;  no wheeze or crackles.  I believe the soft tissue over the sternum is full, towards the R chest wall.  ABDOM: nontender and " nondistended; soft;  no rebound/guarding, L/S NP  MUSC/Skeletal: ROM normal; no crepitus; joints normal; no deformities  EXTREM: no clubbing, cyanosis, inflammation   SKIN: no rashes.  : no CVAT.  NEURO: grossly intact; motor/sensory WNL; no tremors  PSYCH: normal mood, affect and behavior  LYMPH: L inguinal LN      Radiology/Diagnostic Studies:    Mamm 1/2018 negative.  Bone marrow 2014, 25% cellularity.  IgG 2,000 now at 1862.  Hgb 9.5 WBC 4500, plt cnt 598,000.  Polyclonal gammopathy    CAT of chest mottled sternum, 2.2 axillary LN  Bone Scan multiple gilberto of uptake including sacrum.  CAT diffuse LN, sclerotic bone lesions  Mamm YONATHAN    Total protein 9.7, Hgb 10.4, lab eval ordered.    Assessment:   (1) 47 y.o. female with chronic anemia secondary to decreased bone marrow cellularity.  Blood counts low but stable.      (2)Chronic LBP.  Fullness at R sternal area.  Abnormal Bone Scan, possible metastatic dx.    Discussed with radiologist.  Bx of L iliac LN showed granulomatous lymphadenopathy with noncaseating granuloma.  Differential diagnosis includes sarcoidosis, infectious lymphadenopathy, and non-Hodgkin's lymphoma.    Dr. Rendon for open biopsy of left inguinal lymph node.  Sample of the lymph node biopsy, not placed in formalin, for AFB stain and Gram stain and AFB C/S and routine C/S.  Flow cytometry studies on the lymph node biopsy for leukemia lymphoma phenotyping were negative.    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  Dr. Vick. Of ID has seen her and is treating her with antibiotics.  [facility] called, that she had AFB on C/S of R inguinal LN.    Appt with Dr. Adams, for eval and management for Sarcoid?  Appt with Dr. Malave, for review of LN C/S, AFB status.  Appt with PMD, Sergey Velázquez MD    Lab eval. Ordered for Anemia status?  Polyclonal gammopathy?    RTC 1 month.

## 2022-12-23 LAB
ALBUMIN SERPL ELPH-MCNC: 4 G/DL (ref 2.9–4.4)
ALBUMIN/GLOB SERPL: 0.8 {RATIO} (ref 0.7–1.7)
ALPHA1 GLOB SERPL ELPH-MCNC: 0.3 G/DL (ref 0–0.4)
ALPHA2 GLOB SERPL ELPH-MCNC: 1 G/DL (ref 0.4–1)
B-GLOBULIN SERPL ELPH-MCNC: 1.6 G/DL (ref 0.7–1.3)
GAMMA GLOB SERPL ELPH-MCNC: 2.4 G/DL (ref 0.4–1.8)
GLOBULIN SER CALC-MCNC: 5.2 G/DL (ref 2.2–3.9)
LABORATORY COMMENT REPORT: ABNORMAL
M PROTEIN SERPL ELPH-MCNC: ABNORMAL G/DL
PROT SERPL-MCNC: 9.2 G/DL (ref 6–8.5)

## 2022-12-24 LAB
EPO SERPL-ACNC: 11.2 MIU/ML (ref 2.6–18.5)
IGA SERPL-MCNC: 415 MG/DL (ref 87–352)
IGA SERPL-MCNC: 415 MG/DL (ref 87–352)
IGG SERPL-MCNC: 2191 MG/DL (ref 586–1602)
IGG SERPL-MCNC: 2191 MG/DL (ref 586–1602)
IGM SERPL-MCNC: 187 MG/DL (ref 26–217)
IGM SERPL-MCNC: 187 MG/DL (ref 26–217)
PROT PATTERN SERPL IFE-IMP: ABNORMAL

## 2023-02-14 ENCOUNTER — OFFICE VISIT (OUTPATIENT)
Dept: HEMATOLOGY/ONCOLOGY | Facility: CLINIC | Age: 48
End: 2023-02-14
Payer: MEDICAID

## 2023-02-14 VITALS
HEIGHT: 68 IN | WEIGHT: 227.19 LBS | DIASTOLIC BLOOD PRESSURE: 67 MMHG | RESPIRATION RATE: 18 BRPM | SYSTOLIC BLOOD PRESSURE: 128 MMHG | TEMPERATURE: 97 F | HEART RATE: 76 BPM | BODY MASS INDEX: 34.43 KG/M2

## 2023-02-14 DIAGNOSIS — G89.29 CHRONIC MIDLINE LOW BACK PAIN WITHOUT SCIATICA: ICD-10-CM

## 2023-02-14 DIAGNOSIS — R93.7 ABNORMAL BONE XRAY: ICD-10-CM

## 2023-02-14 DIAGNOSIS — M54.50 CHRONIC MIDLINE LOW BACK PAIN WITHOUT SCIATICA: ICD-10-CM

## 2023-02-14 PROCEDURE — 1159F MED LIST DOCD IN RCRD: CPT | Mod: CPTII,S$GLB,, | Performed by: INTERNAL MEDICINE

## 2023-02-14 PROCEDURE — 3074F PR MOST RECENT SYSTOLIC BLOOD PRESSURE < 130 MM HG: ICD-10-PCS | Mod: CPTII,S$GLB,, | Performed by: INTERNAL MEDICINE

## 2023-02-14 PROCEDURE — 99213 PR OFFICE/OUTPT VISIT, EST, LEVL III, 20-29 MIN: ICD-10-PCS | Mod: S$GLB,,, | Performed by: INTERNAL MEDICINE

## 2023-02-14 PROCEDURE — 3078F PR MOST RECENT DIASTOLIC BLOOD PRESSURE < 80 MM HG: ICD-10-PCS | Mod: CPTII,S$GLB,, | Performed by: INTERNAL MEDICINE

## 2023-02-14 PROCEDURE — 3008F PR BODY MASS INDEX (BMI) DOCUMENTED: ICD-10-PCS | Mod: CPTII,S$GLB,, | Performed by: INTERNAL MEDICINE

## 2023-02-14 PROCEDURE — 3074F SYST BP LT 130 MM HG: CPT | Mod: CPTII,S$GLB,, | Performed by: INTERNAL MEDICINE

## 2023-02-14 PROCEDURE — 1159F PR MEDICATION LIST DOCUMENTED IN MEDICAL RECORD: ICD-10-PCS | Mod: CPTII,S$GLB,, | Performed by: INTERNAL MEDICINE

## 2023-02-14 PROCEDURE — 3008F BODY MASS INDEX DOCD: CPT | Mod: CPTII,S$GLB,, | Performed by: INTERNAL MEDICINE

## 2023-02-14 PROCEDURE — 3078F DIAST BP <80 MM HG: CPT | Mod: CPTII,S$GLB,, | Performed by: INTERNAL MEDICINE

## 2023-02-14 PROCEDURE — 99213 OFFICE O/P EST LOW 20 MIN: CPT | Mod: S$GLB,,, | Performed by: INTERNAL MEDICINE

## 2023-02-14 RX ORDER — HYDROCODONE BITARTRATE AND ACETAMINOPHEN 7.5; 325 MG/1; MG/1
1 TABLET ORAL EVERY 6 HOURS PRN
Qty: 120 TABLET | Refills: 0 | Status: SHIPPED | OUTPATIENT
Start: 2023-02-14 | End: 2023-03-17 | Stop reason: SDUPTHER

## 2023-02-15 NOTE — PROGRESS NOTES
[facility] Hematology Oncology In Office subsequent Encounter Note  2/14/23    Subjective:      Patient ID:   Robina Potter                                         47 y.o. female                                             1975  Sergey Velázquez MD    Chief Complaint:   Anemia, LBP    HPI:  47 y.o. female with anemia 2nd decreased cellularity of bone marrow.  Chronic low back pain sx +.  Applies heat with relief.  Admits to increased LBP sx, pain in hand and knee joints  with damp, cold weather of last few days.    She had percutaneous needle biopsy of left inguinal lymph node.  The pathology report showed granulomatous lymphadenitis with noncaseating granuloma.  Differential diagnosis included sarcoidosis, infection, and lymphoma.  Radiologist recommended open biopsy with additional tissue to rule out lymphoma.    I made a referral for her to see Dr. Rendon for open lymph node biopsy.  Specimen would be submitted for AFB and Gram stain,  AFB culture and routine culture and flow cytometry studies for leukemia lymphoma phenotyping submitted on a fresh lymph node biopsy,   not placed in formalin.  Also the lymph node biopsy would be submitted for routine H and E stain per pathology.    I spoke with Dr. Alba of pathology.  The flow cytometry studies did not show lymphoma.  Dr. Marcos will be issuing the final pathology report.  The culture from the lymph node biopsy returned 3+ positive.  Achromobacter xylosoxidans.  She may have systemic involvement with this organism with lymphadenopathy and bone involvement.  Will make a referral to Infectious Disease specialist to see how this will be treated?    Anemia re evaluation showed Hgb 10.9, results reviewed with pt.  Anemia of chronic dx.  Polyclonal gammopathy.    She saw Dr. Vick of ID in [place].  He does not think this is TB.  He has given her antibiotics x's 14 days.  Overall pain sx are improved.    She lost her antibiotics, we called her  "pharmacist and reordered her bactrim BID for her.  We received call from [facility], that she has AFB on C/S of LN.    GERD +.  Pyelonephritis +.  Fibroids hx.  Recurrant UTI.  Cystoscopy done.    Seen for chest pain sx, CAT negative for PE.  Dr. Strong.  She has GI sx, on meds per Dr. Strong..  She saw Dr. Strong, may need hernia repair.  Saw Dr. Spence for migraine eval? Sx better.  Bladder eval negative.  Dr. Méndez.  She denies bladder sx now.    No recent URI or UTI sx.    Refill meds for pain control.    Smoke 1/3 ppd.  Etoh no.  Job, security worker.    Cholecystectomy +, M0.    Mom  SLE.  1 child cerebral palsy.      ROS:   GEN: normal without any fever, night sweats or weight loss  HEENT:  HA's better  CV: normal with no CP, SOB, PND, SIMON or orthopnea  PULM: See HPI.  GI: GERD.  See history of present illness.  : Pyelonephritis hx. SEE HPI.  BREAST: normal with no mass, discharge, pain  SKIN: normal with no rash, erythema.    Review of patient's allergies indicates:  No Known Allergies    Current Outpatient Medications:     diclofenac sodium (VOLTAREN) 1 % Gel, Apply 2 g topically 4 (four) times daily. (Patient not taking: Reported on 2023), Disp: 350 g, Rfl: 0    HYDROcodone-acetaminophen (NORCO) 7.5-325 mg per tablet, Take 1 tablet by mouth every 6 (six) hours as needed for Pain., Disp: 120 tablet, Rfl: 0    meloxicam (MOBIC) 7.5 MG tablet, Take 7.5 mg by mouth once daily., Disp: , Rfl:           Objective:   Vitals:  Blood pressure 128/67, pulse 76, temperature 97.2 °F (36.2 °C), resp. rate 18, height 5' 7.5" (1.715 m), weight 103.1 kg (227 lb 3.2 oz).    Physical Examination:   GEN: no apparent distress, comfortable  HEAD: atraumatic and normocephalic  EYES: no pallor, no icterus  ENT: no pharyngeal erythema, external ears WNL; no nasal discharge  NECK: no masses, thyroid normal, trachea midline, no LAD/LN's, supple  CV: RRR with no murmur; normal pulse; normal S1 and S2; no pedal " edema  CHEST: Normal respiratory effort; CTAB;  no wheeze or crackles.  I believe the soft tissue over the sternum is full, towards the R chest wall.  ABDOM: nontender and nondistended; soft;  no rebound/guarding, L/S NP  MUSC/Skeletal: ROM normal; no crepitus; joints normal; no deformities  EXTREM: no clubbing, cyanosis, inflammation   SKIN: no rashes.  : no CVAT.  NEURO: grossly intact; motor/sensory WNL; no tremors  PSYCH: normal mood, affect and behavior  LYMPH: L inguinal LN      Radiology/Diagnostic Studies:    Mamm 1/2018 negative.  Bone marrow 2014, 25% cellularity.  IgG 2,000 now at 1862.  Hgb 9.5 WBC 4500, plt cnt 598,000.  Polyclonal gammopathy    CAT of chest mottled sternum, 2.2 axillary LN  Bone Scan multiple gilberto of uptake including sacrum.  CAT diffuse LN, sclerotic bone lesions  Mamm YONATHAN    Total protein 9.7, Hgb 10.4, lab eval ordered.    Assessment:   (1) 47 y.o. female with chronic anemia secondary to decreased bone marrow cellularity.  Blood counts low but stable.   Anemia of chronic dx.     (2)Chronic LBP.  Fullness at R sternal area.  Abnormal Bone Scan, possible metastatic dx.    Discussed with radiologist.  Bx of L iliac LN showed granulomatous lymphadenopathy with noncaseating granuloma.  Differential diagnosis includes sarcoidosis, infectious lymphadenopathy, and non-Hodgkin's lymphoma.    Dr. Rendon for open biopsy of left inguinal lymph node.  Sample of the lymph node biopsy, not placed in formalin, for AFB stain and Gram stain and AFB C/S and routine C/S.  Flow cytometry studies on the lymph node biopsy for leukemia lymphoma phenotyping were negative.    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  Dr. Vick. Of ID has seen her and is treating her with antibiotics.  [facility] called, that she had AFB on C/S of R inguinal LN.    Appt with Dr. Adams, for eval and management for Sarcoid?  Appt with Dr. Malave, for review of LN C/S, AFB status.  Appt with PMD,  Sergey Velázquez MD    Polyclonal gammopathy    RTC 1 month.

## 2023-03-16 ENCOUNTER — OFFICE VISIT (OUTPATIENT)
Dept: HEMATOLOGY/ONCOLOGY | Facility: CLINIC | Age: 48
End: 2023-03-16
Payer: MEDICAID

## 2023-03-16 VITALS
WEIGHT: 206.69 LBS | RESPIRATION RATE: 18 BRPM | SYSTOLIC BLOOD PRESSURE: 149 MMHG | DIASTOLIC BLOOD PRESSURE: 95 MMHG | TEMPERATURE: 98 F | HEIGHT: 68 IN | BODY MASS INDEX: 31.32 KG/M2 | HEART RATE: 78 BPM

## 2023-03-16 DIAGNOSIS — R59.0 LYMPHADENOPATHY, INGUINAL: ICD-10-CM

## 2023-03-16 DIAGNOSIS — R93.7 ABNORMAL BONE XRAY: ICD-10-CM

## 2023-03-16 DIAGNOSIS — D63.8 ANEMIA IN OTHER CHRONIC DISEASES CLASSIFIED ELSEWHERE: Primary | ICD-10-CM

## 2023-03-16 PROCEDURE — 3008F PR BODY MASS INDEX (BMI) DOCUMENTED: ICD-10-PCS | Mod: CPTII,S$GLB,, | Performed by: INTERNAL MEDICINE

## 2023-03-16 PROCEDURE — 99213 OFFICE O/P EST LOW 20 MIN: CPT | Mod: S$GLB,,, | Performed by: INTERNAL MEDICINE

## 2023-03-16 PROCEDURE — 1159F MED LIST DOCD IN RCRD: CPT | Mod: CPTII,S$GLB,, | Performed by: INTERNAL MEDICINE

## 2023-03-16 PROCEDURE — 3077F PR MOST RECENT SYSTOLIC BLOOD PRESSURE >= 140 MM HG: ICD-10-PCS | Mod: CPTII,S$GLB,, | Performed by: INTERNAL MEDICINE

## 2023-03-16 PROCEDURE — 3008F BODY MASS INDEX DOCD: CPT | Mod: CPTII,S$GLB,, | Performed by: INTERNAL MEDICINE

## 2023-03-16 PROCEDURE — 3080F PR MOST RECENT DIASTOLIC BLOOD PRESSURE >= 90 MM HG: ICD-10-PCS | Mod: CPTII,S$GLB,, | Performed by: INTERNAL MEDICINE

## 2023-03-16 PROCEDURE — 3080F DIAST BP >= 90 MM HG: CPT | Mod: CPTII,S$GLB,, | Performed by: INTERNAL MEDICINE

## 2023-03-16 PROCEDURE — 3077F SYST BP >= 140 MM HG: CPT | Mod: CPTII,S$GLB,, | Performed by: INTERNAL MEDICINE

## 2023-03-16 PROCEDURE — 99213 PR OFFICE/OUTPT VISIT, EST, LEVL III, 20-29 MIN: ICD-10-PCS | Mod: S$GLB,,, | Performed by: INTERNAL MEDICINE

## 2023-03-16 PROCEDURE — 1159F PR MEDICATION LIST DOCUMENTED IN MEDICAL RECORD: ICD-10-PCS | Mod: CPTII,S$GLB,, | Performed by: INTERNAL MEDICINE

## 2023-03-17 DIAGNOSIS — M54.50 CHRONIC MIDLINE LOW BACK PAIN WITHOUT SCIATICA: ICD-10-CM

## 2023-03-17 DIAGNOSIS — G89.29 CHRONIC MIDLINE LOW BACK PAIN WITHOUT SCIATICA: ICD-10-CM

## 2023-03-17 DIAGNOSIS — R93.7 ABNORMAL BONE XRAY: ICD-10-CM

## 2023-03-17 RX ORDER — HYDROCODONE BITARTRATE AND ACETAMINOPHEN 7.5; 325 MG/1; MG/1
1 TABLET ORAL EVERY 6 HOURS PRN
Qty: 120 TABLET | Refills: 0 | Status: SHIPPED | OUTPATIENT
Start: 2023-03-17 | End: 2023-04-19 | Stop reason: SDUPTHER

## 2023-03-18 NOTE — PROGRESS NOTES
[facility] Hematology Oncology In Office subsequent Encounter Note  3/16/23    Subjective:      Patient ID:   Robina Potter                                         47 y.o. female                                             1975  Sergey Velázquez MD    Chief Complaint:   Anemia, LBP    HPI:  47 y.o. female with anemia 2nd decreased cellularity of bone marrow.  Chronic low back pain sx +.  Applies heat with relief.  Admits to increased LBP sx, pain in hand and knee joints  with damp, cold weather of last few days.    She had percutaneous needle biopsy of left inguinal lymph node.  The pathology report showed granulomatous lymphadenitis with noncaseating granuloma.  Differential diagnosis included sarcoidosis, infection, and lymphoma.  Radiologist recommended open biopsy with additional tissue to rule out lymphoma.    I made a referral for her to see Dr. Rendon for open lymph node biopsy.  Specimen would be submitted for AFB and Gram stain,  AFB culture and routine culture and flow cytometry studies for leukemia lymphoma phenotyping submitted on a fresh lymph node biopsy,   not placed in formalin.  Also the lymph node biopsy would be submitted for routine H and E stain per pathology.    I spoke with Dr. Alba of pathology.  The flow cytometry studies did not show lymphoma.  Dr. Marcos will be issuing the final pathology report.  The culture from the lymph node biopsy returned 3+ positive.  Achromobacter xylosoxidans.  She may have systemic involvement with this organism with lymphadenopathy and bone involvement.  Will make a referral to Infectious Disease specialist to see how this will be treated?    Anemia re evaluation showed Hgb 10.9, results reviewed with pt.  Anemia of chronic dx.  Polyclonal gammopathy.    She saw Dr. Vick of ID in [place].  He does not think this is TB.  He has given her antibiotics x's 14 days.  Overall pain sx are improved.    She lost her antibiotics, we called her  "pharmacist and reordered her bactrim BID for her.  We received call from [facility], that she has AFB on C/S of LN.    GERD +.  Pyelonephritis +.  Fibroids hx.  Recurrant UTI.  Cystoscopy done.    Seen for chest pain sx, CAT negative for PE.  Dr. Strong.  She has GI sx, on meds per Dr. Strong..  She saw Dr. Strong, may need hernia repair.  Saw Dr. Spence for migraine eval? Sx better.  Bladder eval negative.  Dr. Méndez.  She denies bladder sx now.    No recent URI or UTI sx.    Refill meds for pain control.    Smoke 1/3 ppd.  Etoh no.  Job, security worker.    Cholecystectomy +, M0.    Mom  SLE.  1 child cerebral palsy.      ROS:   GEN: normal without any fever, night sweats or weight loss  HEENT:  HA's better  CV: normal with no CP, SOB, PND, SIMON or orthopnea  PULM: See HPI.  GI: GERD.  See history of present illness.  : Pyelonephritis hx. SEE HPI.  BREAST: normal with no mass, discharge, pain  SKIN: normal with no rash, erythema.    Review of patient's allergies indicates:  No Known Allergies    Current Outpatient Medications:     HYDROcodone-acetaminophen (NORCO) 7.5-325 mg per tablet, Take 1 tablet by mouth every 6 (six) hours as needed for Pain., Disp: 120 tablet, Rfl: 0    diclofenac sodium (VOLTAREN) 1 % Gel, Apply 2 g topically 4 (four) times daily. (Patient not taking: Reported on 3/16/2023), Disp: 350 g, Rfl: 0    meloxicam (MOBIC) 7.5 MG tablet, Take 7.5 mg by mouth once daily., Disp: , Rfl:           Objective:   Vitals:  Blood pressure (!) 149/95, pulse 78, temperature 97.6 °F (36.4 °C), resp. rate 18, height 5' 7.5" (1.715 m), weight 93.8 kg (206 lb 11.2 oz).    Physical Examination:   GEN: no apparent distress, comfortable  HEAD: atraumatic and normocephalic  EYES: no pallor, no icterus  ENT: no pharyngeal erythema, external ears WNL; no nasal discharge  NECK: no masses, thyroid normal, trachea midline, no LAD/LN's, supple  CV: RRR with no murmur; normal pulse; normal S1 and S2; no pedal " edema  CHEST: Normal respiratory effort; CTAB;  no wheeze or crackles.  I believe the soft tissue over the sternum is full, towards the R chest wall.  ABDOM: nontender and nondistended; soft;  no rebound/guarding, L/S NP  MUSC/Skeletal: ROM normal; no crepitus; joints normal; no deformities  EXTREM: no clubbing, cyanosis, inflammation   SKIN: no rashes.  : no CVAT.  NEURO: grossly intact; motor/sensory WNL; no tremors  PSYCH: normal mood, affect and behavior  LYMPH: L inguinal LN      Radiology/Diagnostic Studies:    Mamm 1/2018 negative.  Bone marrow 2014, 25% cellularity.  IgG 2,000 now at 1862.  Hgb 9.5 WBC 4500, plt cnt 598,000.  Polyclonal gammopathy    CAT of chest mottled sternum, 2.2 axillary LN  Bone Scan multiple gilberto of uptake including sacrum.  CAT diffuse LN, sclerotic bone lesions  Mamm YONATHAN    Total protein 9.7, Hgb 10.4, lab eval ordered.    Assessment:   (1) 47 y.o. female with chronic anemia secondary to decreased bone marrow cellularity.  Blood counts low but stable.   Anemia of chronic dx.     (2)Chronic LBP.  Fullness at R sternal area.  Abnormal Bone Scan, possible metastatic dx.    Discussed with radiologist.  Bx of L iliac LN showed granulomatous lymphadenopathy with noncaseating granuloma.  Differential diagnosis includes sarcoidosis, infectious lymphadenopathy, and non-Hodgkin's lymphoma.    Dr. Rendon for open biopsy of left inguinal lymph node.  Sample of the lymph node biopsy, not placed in formalin, for AFB stain and Gram stain and AFB C/S and routine C/S.  Flow cytometry studies on the lymph node biopsy for leukemia lymphoma phenotyping were negative.    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  Dr. Vick. Of ID has seen her and is treating her with antibiotics.  [facility] called, that she had AFB on C/S of R inguinal LN.    Appt with Dr. Adams, for eval and management for Sarcoid?  Appt with Dr. Malave, for review of LN C/S, AFB status.  Appt with PMD,  Sergey Velázquez MD    Polyclonal gammopathy    RTC 1 month.

## 2023-04-19 DIAGNOSIS — R93.7 ABNORMAL BONE XRAY: ICD-10-CM

## 2023-04-19 DIAGNOSIS — G89.29 CHRONIC MIDLINE LOW BACK PAIN WITHOUT SCIATICA: ICD-10-CM

## 2023-04-19 DIAGNOSIS — M54.50 CHRONIC MIDLINE LOW BACK PAIN WITHOUT SCIATICA: ICD-10-CM

## 2023-04-20 RX ORDER — HYDROCODONE BITARTRATE AND ACETAMINOPHEN 7.5; 325 MG/1; MG/1
1 TABLET ORAL EVERY 6 HOURS PRN
Qty: 120 TABLET | Refills: 0 | Status: SHIPPED | OUTPATIENT
Start: 2023-04-20 | End: 2023-05-09 | Stop reason: SDUPTHER

## 2023-05-09 ENCOUNTER — OFFICE VISIT (OUTPATIENT)
Dept: HEMATOLOGY/ONCOLOGY | Facility: CLINIC | Age: 48
End: 2023-05-09
Payer: MEDICAID

## 2023-05-09 VITALS
RESPIRATION RATE: 16 BRPM | DIASTOLIC BLOOD PRESSURE: 80 MMHG | HEIGHT: 68 IN | BODY MASS INDEX: 30.08 KG/M2 | TEMPERATURE: 97 F | HEART RATE: 81 BPM | WEIGHT: 198.5 LBS | SYSTOLIC BLOOD PRESSURE: 119 MMHG

## 2023-05-09 DIAGNOSIS — R93.7 ABNORMAL BONE XRAY: ICD-10-CM

## 2023-05-09 DIAGNOSIS — M54.50 CHRONIC MIDLINE LOW BACK PAIN WITHOUT SCIATICA: ICD-10-CM

## 2023-05-09 DIAGNOSIS — G89.29 CHRONIC MIDLINE LOW BACK PAIN WITHOUT SCIATICA: ICD-10-CM

## 2023-05-09 PROCEDURE — 3008F PR BODY MASS INDEX (BMI) DOCUMENTED: ICD-10-PCS | Mod: CPTII,S$GLB,, | Performed by: INTERNAL MEDICINE

## 2023-05-09 PROCEDURE — 3079F PR MOST RECENT DIASTOLIC BLOOD PRESSURE 80-89 MM HG: ICD-10-PCS | Mod: CPTII,S$GLB,, | Performed by: INTERNAL MEDICINE

## 2023-05-09 PROCEDURE — 3074F PR MOST RECENT SYSTOLIC BLOOD PRESSURE < 130 MM HG: ICD-10-PCS | Mod: CPTII,S$GLB,, | Performed by: INTERNAL MEDICINE

## 2023-05-09 PROCEDURE — 99213 OFFICE O/P EST LOW 20 MIN: CPT | Mod: S$GLB,,, | Performed by: INTERNAL MEDICINE

## 2023-05-09 PROCEDURE — 99213 PR OFFICE/OUTPT VISIT, EST, LEVL III, 20-29 MIN: ICD-10-PCS | Mod: S$GLB,,, | Performed by: INTERNAL MEDICINE

## 2023-05-09 PROCEDURE — 1159F PR MEDICATION LIST DOCUMENTED IN MEDICAL RECORD: ICD-10-PCS | Mod: CPTII,S$GLB,, | Performed by: INTERNAL MEDICINE

## 2023-05-09 PROCEDURE — 3079F DIAST BP 80-89 MM HG: CPT | Mod: CPTII,S$GLB,, | Performed by: INTERNAL MEDICINE

## 2023-05-09 PROCEDURE — 3008F BODY MASS INDEX DOCD: CPT | Mod: CPTII,S$GLB,, | Performed by: INTERNAL MEDICINE

## 2023-05-09 PROCEDURE — 3074F SYST BP LT 130 MM HG: CPT | Mod: CPTII,S$GLB,, | Performed by: INTERNAL MEDICINE

## 2023-05-09 PROCEDURE — 1159F MED LIST DOCD IN RCRD: CPT | Mod: CPTII,S$GLB,, | Performed by: INTERNAL MEDICINE

## 2023-05-09 RX ORDER — HYDROCODONE BITARTRATE AND ACETAMINOPHEN 7.5; 325 MG/1; MG/1
1 TABLET ORAL EVERY 6 HOURS PRN
Qty: 120 TABLET | Refills: 0 | Status: SHIPPED | OUTPATIENT
Start: 2023-05-09 | End: 2023-06-20 | Stop reason: SDUPTHER

## 2023-05-09 NOTE — PROGRESS NOTES
[facility] Hematology Oncology In Office subsequent Encounter Note  5/9/23    Subjective:      Patient ID:   Robina Potter                                         47 y.o. female                                             1975  Sergey Velázquez MD    Chief Complaint:   Anemia, LBP    HPI:  47 y.o. female with anemia 2nd decreased cellularity of bone marrow.  Chronic low back pain sx +.  Applies heat with relief.  Admits to increased LBP sx, pain in hand and knee joints  with damp, cold weather of last few days.    She had percutaneous needle biopsy of left inguinal lymph node.  The pathology report showed granulomatous lymphadenitis with noncaseating granuloma.  Differential diagnosis included sarcoidosis, infection, and lymphoma.  Radiologist recommended open biopsy with additional tissue to rule out lymphoma.    I made a referral for her to see Dr. Rendon for open lymph node biopsy.  Specimen would be submitted for AFB and Gram stain,  AFB culture and routine culture and flow cytometry studies for leukemia lymphoma phenotyping submitted on a fresh lymph node biopsy,   not placed in formalin.  Also the lymph node biopsy would be submitted for routine H and E stain per pathology.    I spoke with Dr. Alba of pathology.  The flow cytometry studies did not show lymphoma.  Dr. Marcos will be issuing the final pathology report.  The culture from the lymph node biopsy returned 3+ positive.  Achromobacter xylosoxidans.  She may have systemic involvement with this organism with lymphadenopathy and bone involvement.  Will make a referral to Infectious Disease specialist to see how this will be treated?    Anemia re evaluation showed Hgb 10.9, results reviewed with pt.  Anemia of chronic dx.  Polyclonal gammopathy.    She saw Dr. Vikc of ID in [place].  He does not think this is TB.  He has given her antibiotics x's 14 days.  Overall pain sx are improved.    She lost her antibiotics, we called her  "pharmacist and reordered her bactrim BID for her.  We received call from [facility], that she has AFB on C/S of LN.    GERD +.  Pyelonephritis +.  Fibroids hx.  Recurrant UTI.  Cystoscopy done.    Seen for chest pain sx, CAT negative for PE.  Dr. Strong.  She has GI sx, on meds per Dr. Strong..  She saw Dr. Strong, may need hernia repair.  Saw Dr. Spence for migraine eval? Sx better.  Bladder eval negative.  Dr. Méndez.  She denies bladder sx now.    No recent URI or UTI sx.    Refill meds for pain control.    Smoke 1/3 ppd.  Etoh no.  Job, security worker.    Cholecystectomy +, M0.    Mom  SLE.  1 child cerebral palsy.      ROS:   GEN: normal without any fever, night sweats or weight loss  HEENT:  HA's better  CV: normal with no CP, SOB, PND, SIMON or orthopnea  PULM: See HPI.  GI: GERD.  See history of present illness.  : Pyelonephritis hx. SEE HPI.  BREAST: normal with no mass, discharge, pain  SKIN: normal with no rash, erythema.    Review of patient's allergies indicates:  No Known Allergies    Current Outpatient Medications:     meloxicam (MOBIC) 7.5 MG tablet, Take 7.5 mg by mouth once daily., Disp: , Rfl:     diclofenac sodium (VOLTAREN) 1 % Gel, Apply 2 g topically 4 (four) times daily. (Patient not taking: Reported on 3/16/2023), Disp: 350 g, Rfl: 0    HYDROcodone-acetaminophen (NORCO) 7.5-325 mg per tablet, Take 1 tablet by mouth every 6 (six) hours as needed for Pain., Disp: 120 tablet, Rfl: 0          Objective:   Vitals:  Blood pressure 119/80, pulse 81, temperature 96.8 °F (36 °C), resp. rate 16, height 5' 7.5" (1.715 m), weight 90 kg (198 lb 8 oz).    Physical Examination:   GEN: no apparent distress, comfortable  HEAD: atraumatic and normocephalic  EYES: no pallor, no icterus  ENT: no pharyngeal erythema, external ears WNL; no nasal discharge  NECK: no masses, thyroid normal, trachea midline, no LAD/LN's, supple  CV: RRR with no murmur; normal pulse; normal S1 and S2; no pedal edema  CHEST: " Normal respiratory effort; CTAB;  no wheeze or crackles.  I believe the soft tissue over the sternum is full, towards the R chest wall.  ABDOM: nontender and nondistended; soft;  no rebound/guarding, L/S NP  MUSC/Skeletal: ROM normal; no crepitus; joints normal; no deformities  EXTREM: no clubbing, cyanosis, inflammation   SKIN: no rashes.  : no CVAT.  NEURO: grossly intact; motor/sensory WNL; no tremors  PSYCH: normal mood, affect and behavior  LYMPH: L inguinal LN      Radiology/Diagnostic Studies:    Mamm 1/2018 negative.  Bone marrow 2014, 25% cellularity.  IgG 2,000 now at 1862.  Hgb 9.5 WBC 4500, plt cnt 598,000.  Polyclonal gammopathy    CAT of chest mottled sternum, 2.2 axillary LN  Bone Scan multiple gilberto of uptake including sacrum.  CAT diffuse LN, sclerotic bone lesions  Mamm YONATHAN    Total protein 9.7, Hgb 10.4, lab eval ordered.    Assessment:   (1) 47 y.o. female with chronic anemia secondary to decreased bone marrow cellularity.  Blood counts low but stable.   Anemia of chronic dx.     (2)Chronic LBP.  Fullness at R sternal area.  Abnormal Bone Scan, possible metastatic dx.    Discussed with radiologist.  Bx of L iliac LN showed granulomatous lymphadenopathy with noncaseating granuloma.  Differential diagnosis includes sarcoidosis, infectious lymphadenopathy, and non-Hodgkin's lymphoma.    Dr. Rendon for open biopsy of left inguinal lymph node.  Sample of the lymph node biopsy, not placed in formalin, for AFB stain and Gram stain and AFB C/S and routine C/S.  Flow cytometry studies on the lymph node biopsy for leukemia lymphoma phenotyping were negative.    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  Dr. Vick. Of ID has seen her and is treating her with antibiotics.  [facility] called, that she had AFB on C/S of R inguinal LN.    Appt with Dr. Adams, for eval and management for Sarcoid?  Appt with Dr. Malave, for review of LN C/S, AFB status.  She is not compliant in seeing  specialists, she does keep Dr. Velázquez appt.    Polyclonal gammopathy  Anemia chronic Dx.  RTC 5 month.

## 2023-06-20 ENCOUNTER — OFFICE VISIT (OUTPATIENT)
Dept: HEMATOLOGY/ONCOLOGY | Facility: CLINIC | Age: 48
End: 2023-06-20
Payer: MEDICAID

## 2023-06-20 VITALS
WEIGHT: 190.81 LBS | HEART RATE: 74 BPM | SYSTOLIC BLOOD PRESSURE: 121 MMHG | RESPIRATION RATE: 18 BRPM | DIASTOLIC BLOOD PRESSURE: 64 MMHG | BODY MASS INDEX: 28.92 KG/M2 | HEIGHT: 68 IN | TEMPERATURE: 97 F

## 2023-06-20 DIAGNOSIS — M54.50 CHRONIC MIDLINE LOW BACK PAIN WITHOUT SCIATICA: ICD-10-CM

## 2023-06-20 DIAGNOSIS — R93.7 ABNORMAL BONE XRAY: ICD-10-CM

## 2023-06-20 DIAGNOSIS — G89.29 CHRONIC MIDLINE LOW BACK PAIN WITHOUT SCIATICA: ICD-10-CM

## 2023-06-20 DIAGNOSIS — Z12.31 BREAST CANCER SCREENING BY MAMMOGRAM: Primary | ICD-10-CM

## 2023-06-20 PROCEDURE — 3008F PR BODY MASS INDEX (BMI) DOCUMENTED: ICD-10-PCS | Mod: CPTII,S$GLB,, | Performed by: INTERNAL MEDICINE

## 2023-06-20 PROCEDURE — 99213 PR OFFICE/OUTPT VISIT, EST, LEVL III, 20-29 MIN: ICD-10-PCS | Mod: S$GLB,,, | Performed by: INTERNAL MEDICINE

## 2023-06-20 PROCEDURE — 1159F MED LIST DOCD IN RCRD: CPT | Mod: CPTII,S$GLB,, | Performed by: INTERNAL MEDICINE

## 2023-06-20 PROCEDURE — 3074F SYST BP LT 130 MM HG: CPT | Mod: CPTII,S$GLB,, | Performed by: INTERNAL MEDICINE

## 2023-06-20 PROCEDURE — 3008F BODY MASS INDEX DOCD: CPT | Mod: CPTII,S$GLB,, | Performed by: INTERNAL MEDICINE

## 2023-06-20 PROCEDURE — 3074F PR MOST RECENT SYSTOLIC BLOOD PRESSURE < 130 MM HG: ICD-10-PCS | Mod: CPTII,S$GLB,, | Performed by: INTERNAL MEDICINE

## 2023-06-20 PROCEDURE — 1159F PR MEDICATION LIST DOCUMENTED IN MEDICAL RECORD: ICD-10-PCS | Mod: CPTII,S$GLB,, | Performed by: INTERNAL MEDICINE

## 2023-06-20 PROCEDURE — 99213 OFFICE O/P EST LOW 20 MIN: CPT | Mod: S$GLB,,, | Performed by: INTERNAL MEDICINE

## 2023-06-20 PROCEDURE — 3078F DIAST BP <80 MM HG: CPT | Mod: CPTII,S$GLB,, | Performed by: INTERNAL MEDICINE

## 2023-06-20 PROCEDURE — 3078F PR MOST RECENT DIASTOLIC BLOOD PRESSURE < 80 MM HG: ICD-10-PCS | Mod: CPTII,S$GLB,, | Performed by: INTERNAL MEDICINE

## 2023-06-20 RX ORDER — HYDROCODONE BITARTRATE AND ACETAMINOPHEN 7.5; 325 MG/1; MG/1
1 TABLET ORAL EVERY 6 HOURS PRN
Qty: 120 TABLET | Refills: 0 | Status: SHIPPED | OUTPATIENT
Start: 2023-06-20 | End: 2023-07-20 | Stop reason: SDUPTHER

## 2023-06-20 NOTE — PROGRESS NOTES
[facility] Hematology Oncology In Office subsequent Encounter Note  6/20/23    Subjective:      Patient ID:   Robina Potter                                         47 y.o. female                                             1975  Sergey Veálzquez MD    Chief Complaint:   Anemia, LBP    HPI:  47 y.o. female with anemia 2nd decreased cellularity of bone marrow.  Chronic low back pain sx +.  Applies heat with relief.  Admits to increased LBP sx, pain in hand and knee joints  with damp, cold weather of last few days.    She had percutaneous needle biopsy of left inguinal lymph node.  The pathology report showed granulomatous lymphadenitis with noncaseating granuloma.  Differential diagnosis included sarcoidosis, infection, and lymphoma.  Radiologist recommended open biopsy with additional tissue to rule out lymphoma.    I made a referral for her to see Dr. Rendon for open lymph node biopsy.  Specimen would be submitted for AFB and Gram stain,  AFB culture and routine culture and flow cytometry studies for leukemia lymphoma phenotyping submitted on a fresh lymph node biopsy,   not placed in formalin.  Also the lymph node biopsy would be submitted for routine H and E stain per pathology.    I spoke with Dr. Alba of pathology.  The flow cytometry studies did not show lymphoma.  Dr. Marcos will be issuing the final pathology report.  The culture from the lymph node biopsy returned 3+ positive.  Achromobacter xylosoxidans.  She may have systemic involvement with this organism with lymphadenopathy and bone involvement.  Will make a referral to Infectious Disease specialist to see how this will be treated?    Anemia re evaluation showed Hgb 10.9, results reviewed with pt.  Anemia of chronic dx.  Polyclonal gammopathy.    She saw Dr. Vick of ID in [place].  He does not think this is TB.  He has given her antibiotics x's 14 days.  Overall pain sx are improved.    She lost her antibiotics, we called her  "pharmacist and reordered her bactrim BID for her.  We received call from [facility], that she has AFB on C/S of LN.    GERD +.  Pyelonephritis +.  Fibroids hx.  Recurrant UTI.  Cystoscopy done.    Seen for chest pain sx, CAT negative for PE.  Dr. Strong.  She has GI sx, on meds per Dr. Strong..  She saw Dr. Strong, may need hernia repair.  Saw Dr. Spence for migraine eval? Sx better.  Bladder eval negative.  Dr. Méndez.  She denies bladder sx now.    No recent URI or UTI sx.    Refill meds for pain control.    Smoke 1/3 ppd.  Etoh no.  Job, security worker.    Cholecystectomy +, M0.    Mom  SLE.  1 child cerebral palsy.      ROS:   GEN: normal without any fever, night sweats or weight loss  HEENT:  HA's better  CV: normal with no CP, SOB, PND, SIMON or orthopnea  PULM: See HPI.  GI: GERD.  See history of present illness.  : Pyelonephritis hx. SEE HPI.  BREAST: normal with no mass, discharge, pain  SKIN: normal with no rash, erythema.    Review of patient's allergies indicates:  No Known Allergies    Current Outpatient Medications:     meloxicam (MOBIC) 7.5 MG tablet, Take 7.5 mg by mouth once daily., Disp: , Rfl:     diclofenac sodium (VOLTAREN) 1 % Gel, Apply 2 g topically 4 (four) times daily. (Patient not taking: Reported on 3/16/2023), Disp: 350 g, Rfl: 0    HYDROcodone-acetaminophen (NORCO) 7.5-325 mg per tablet, Take 1 tablet by mouth every 6 (six) hours as needed for Pain., Disp: 120 tablet, Rfl: 0          Objective:   Vitals:  Blood pressure 121/64, pulse 74, temperature 97.3 °F (36.3 °C), resp. rate 18, height 5' 7.5" (1.715 m), weight 86.5 kg (190 lb 12.8 oz).    Physical Examination:   GEN: no apparent distress, comfortable  HEAD: atraumatic and normocephalic  EYES: no pallor, no icterus  ENT: no pharyngeal erythema, external ears WNL; no nasal discharge  NECK: no masses, thyroid normal, trachea midline, no LAD/LN's, supple  CV: RRR with no murmur; normal pulse; normal S1 and S2; no pedal " edema  CHEST: Normal respiratory effort; CTAB;  no wheeze or crackles.  I believe the soft tissue over the sternum is full, towards the R chest wall.  ABDOM: nontender and nondistended; soft;  no rebound/guarding, L/S NP  MUSC/Skeletal: ROM normal; no crepitus; joints normal; no deformities  EXTREM: no clubbing, cyanosis, inflammation   SKIN: no rashes.  : no CVAT.  NEURO: grossly intact; motor/sensory WNL; no tremors  PSYCH: normal mood, affect and behavior  LYMPH: L inguinal LN      Radiology/Diagnostic Studies:    Mamm 1/2018 negative.  Bone marrow 2014, 25% cellularity.  IgG 2,000 now at 1862.  Hgb 9.5 WBC 4500, plt cnt 598,000.  Polyclonal gammopathy    CAT of chest mottled sternum, 2.2 axillary LN  Bone Scan multiple gilberto of uptake including sacrum.  CAT diffuse LN, sclerotic bone lesions  Mamm YONATHAN    Total protein 9.7, Hgb 10.4, lab eval ordered.    Assessment:   (1) 47 y.o. female with chronic anemia secondary to decreased bone marrow cellularity.  Blood counts low but stable.   Anemia of chronic dx.     (2)Chronic LBP.  Fullness at R sternal area.  Abnormal Bone Scan, possible metastatic dx.    Discussed with radiologist.  Bx of L iliac LN showed granulomatous lymphadenopathy with noncaseating granuloma.  Differential diagnosis includes sarcoidosis, infectious lymphadenopathy, and non-Hodgkin's lymphoma.    Dr. Rendon for open biopsy of left inguinal lymph node.  Sample of the lymph node biopsy, not placed in formalin, for AFB stain and Gram stain and AFB C/S and routine C/S.  Flow cytometry studies on the lymph node biopsy for leukemia lymphoma phenotyping were negative.    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  Dr. Vick. Of ID has seen her and is treating her with antibiotics.  [facility] called, that she had AFB on C/S of R inguinal LN.    Appt with Dr. Adams, for eval and management for Sarcoid?  Appt with Dr. Malave, for review of LN C/S, AFB status.  She is not compliant  in seeing specialists, she does keep Dr. Velázquez appt.    Polyclonal gammopathy  Anemia chronic Dx.  RTC 1 month.

## 2023-07-20 ENCOUNTER — OFFICE VISIT (OUTPATIENT)
Dept: HEMATOLOGY/ONCOLOGY | Facility: CLINIC | Age: 48
End: 2023-07-20
Payer: MEDICAID

## 2023-07-20 VITALS
SYSTOLIC BLOOD PRESSURE: 121 MMHG | DIASTOLIC BLOOD PRESSURE: 84 MMHG | TEMPERATURE: 97 F | BODY MASS INDEX: 30.42 KG/M2 | RESPIRATION RATE: 18 BRPM | HEIGHT: 68 IN | HEART RATE: 77 BPM | WEIGHT: 200.69 LBS

## 2023-07-20 DIAGNOSIS — R93.7 ABNORMAL BONE XRAY: ICD-10-CM

## 2023-07-20 DIAGNOSIS — G89.29 CHRONIC MIDLINE LOW BACK PAIN WITHOUT SCIATICA: ICD-10-CM

## 2023-07-20 DIAGNOSIS — M54.50 CHRONIC MIDLINE LOW BACK PAIN WITHOUT SCIATICA: ICD-10-CM

## 2023-07-20 PROCEDURE — 99213 OFFICE O/P EST LOW 20 MIN: CPT | Mod: S$GLB,,, | Performed by: INTERNAL MEDICINE

## 2023-07-20 PROCEDURE — 3008F BODY MASS INDEX DOCD: CPT | Mod: CPTII,S$GLB,, | Performed by: INTERNAL MEDICINE

## 2023-07-20 PROCEDURE — 1159F MED LIST DOCD IN RCRD: CPT | Mod: CPTII,S$GLB,, | Performed by: INTERNAL MEDICINE

## 2023-07-20 PROCEDURE — 3074F SYST BP LT 130 MM HG: CPT | Mod: CPTII,S$GLB,, | Performed by: INTERNAL MEDICINE

## 2023-07-20 PROCEDURE — 3079F PR MOST RECENT DIASTOLIC BLOOD PRESSURE 80-89 MM HG: ICD-10-PCS | Mod: CPTII,S$GLB,, | Performed by: INTERNAL MEDICINE

## 2023-07-20 PROCEDURE — 3074F PR MOST RECENT SYSTOLIC BLOOD PRESSURE < 130 MM HG: ICD-10-PCS | Mod: CPTII,S$GLB,, | Performed by: INTERNAL MEDICINE

## 2023-07-20 PROCEDURE — 3079F DIAST BP 80-89 MM HG: CPT | Mod: CPTII,S$GLB,, | Performed by: INTERNAL MEDICINE

## 2023-07-20 PROCEDURE — 3008F PR BODY MASS INDEX (BMI) DOCUMENTED: ICD-10-PCS | Mod: CPTII,S$GLB,, | Performed by: INTERNAL MEDICINE

## 2023-07-20 PROCEDURE — 99213 PR OFFICE/OUTPT VISIT, EST, LEVL III, 20-29 MIN: ICD-10-PCS | Mod: S$GLB,,, | Performed by: INTERNAL MEDICINE

## 2023-07-20 PROCEDURE — 1159F PR MEDICATION LIST DOCUMENTED IN MEDICAL RECORD: ICD-10-PCS | Mod: CPTII,S$GLB,, | Performed by: INTERNAL MEDICINE

## 2023-07-20 RX ORDER — HYDROCODONE BITARTRATE AND ACETAMINOPHEN 7.5; 325 MG/1; MG/1
1 TABLET ORAL EVERY 6 HOURS PRN
Qty: 120 TABLET | Refills: 0 | Status: SHIPPED | OUTPATIENT
Start: 2023-07-20 | End: 2023-08-28 | Stop reason: SDUPTHER

## 2023-07-20 NOTE — PROGRESS NOTES
[facility] Hematology Oncology In Office subsequent Encounter Note  7/20/23    Subjective:      Patient ID:   Robina Potter                                         47 y.o. female                                             1975  Sergey Velázquez MD    Chief Complaint:   Anemia, LBP    HPI:  47 y.o. female with anemia 2nd decreased cellularity of bone marrow.  Chronic low back pain sx +.  Applies heat with relief.  Admits to increased LBP sx, pain in hand and knee joints  with damp, cold weather of last few days.    She had percutaneous needle biopsy of left inguinal lymph node.  The pathology report showed granulomatous lymphadenitis with noncaseating granuloma.  Differential diagnosis included sarcoidosis, infection, and lymphoma.  Radiologist recommended open biopsy with additional tissue to rule out lymphoma.    I made a referral for her to see Dr. Rendon for open lymph node biopsy.  Specimen would be submitted for AFB and Gram stain,  AFB culture and routine culture and flow cytometry studies for leukemia lymphoma phenotyping submitted on a fresh lymph node biopsy,   not placed in formalin.  Also the lymph node biopsy would be submitted for routine H and E stain per pathology.    I spoke with Dr. Alba of pathology.  The flow cytometry studies did not show lymphoma.  Dr. Marcos will be issuing the final pathology report.  The culture from the lymph node biopsy returned 3+ positive.  Achromobacter xylosoxidans.  She may have systemic involvement with this organism with lymphadenopathy and bone involvement.  Will make a referral to Infectious Disease specialist to see how this will be treated?    Anemia re evaluation showed Hgb 10.9, results reviewed with pt.  Anemia of chronic dx.  Polyclonal gammopathy.    She saw Dr. Vick of ID in [place].  He does not think this is TB.  He has given her antibiotics x's 14 days.  Overall pain sx are improved.    She lost her antibiotics, we called her  "pharmacist and reordered her bactrim BID for her.  We received call from [facility], that she has AFB on C/S of LN.    GERD +.  Pyelonephritis +.  Fibroids hx.  Recurrant UTI.  Cystoscopy done.    Seen for chest pain sx, CAT negative for PE.  Dr. Strong.  She has GI sx, on meds per Dr. Strong..  She saw Dr. Strong, may need hernia repair.  Saw Dr. Spence for migraine eval? Sx better.  Bladder eval negative.  Dr. Méndez.  She denies bladder sx now.    No recent URI or UTI sx.    Refill meds for pain control.    Smoke 1/3 ppd.  Etoh no.  Job, security worker.    Cholecystectomy +, M0.    Mom  SLE.  1 child cerebral palsy.      ROS:   GEN: normal without any fever, night sweats or weight loss  HEENT:  HA's better  CV: normal with no CP, SOB, PND, SIMON or orthopnea  PULM: See HPI.  GI: GERD.  See history of present illness.  : Pyelonephritis hx. SEE HPI.  BREAST: normal with no mass, discharge, pain  SKIN: normal with no rash, erythema.    Review of patient's allergies indicates:  No Known Allergies    Current Outpatient Medications:     meloxicam (MOBIC) 7.5 MG tablet, Take 7.5 mg by mouth once daily., Disp: , Rfl:     diclofenac sodium (VOLTAREN) 1 % Gel, Apply 2 g topically 4 (four) times daily. (Patient not taking: Reported on 3/16/2023), Disp: 350 g, Rfl: 0    HYDROcodone-acetaminophen (NORCO) 7.5-325 mg per tablet, Take 1 tablet by mouth every 6 (six) hours as needed for Pain., Disp: 120 tablet, Rfl: 0          Objective:   Vitals:  Blood pressure 121/84, pulse 77, temperature 97.2 °F (36.2 °C), resp. rate 18, height 5' 7.5" (1.715 m), weight 91 kg (200 lb 11.2 oz).    Physical Examination:   GEN: no apparent distress, comfortable  HEAD: atraumatic and normocephalic  EYES: no pallor, no icterus  ENT: no pharyngeal erythema, external ears WNL; no nasal discharge  NECK: no masses, thyroid normal, trachea midline, no LAD/LN's, supple  CV: RRR with no murmur; normal pulse; normal S1 and S2; no pedal " edema  CHEST: Normal respiratory effort; CTAB;  no wheeze or crackles.  I believe the soft tissue over the sternum is full, towards the R chest wall.  ABDOM: nontender and nondistended; soft;  no rebound/guarding, L/S NP  MUSC/Skeletal: ROM normal; no crepitus; joints normal; no deformities  EXTREM: no clubbing, cyanosis, inflammation   SKIN: no rashes.  : no CVAT.  NEURO: grossly intact; motor/sensory WNL; no tremors  PSYCH: normal mood, affect and behavior  LYMPH: L inguinal LN      Radiology/Diagnostic Studies:    Mamm 1/2018 negative.  Bone marrow 2014, 25% cellularity.  IgG 2,000 now at 1862.  Hgb 9.5 WBC 4500, plt cnt 598,000.  Polyclonal gammopathy    CAT of chest mottled sternum, 2.2 axillary LN  Bone Scan multiple gilberto of uptake including sacrum.  CAT diffuse LN, sclerotic bone lesions  Mamm YONATHAN    Total protein 9.7, Hgb 10.4, lab eval ordered.    Assessment:   (1) 47 y.o. female with chronic anemia secondary to decreased bone marrow cellularity.  Blood counts low but stable.   Anemia of chronic dx.     (2)Chronic LBP.  Fullness at R sternal area.  Abnormal Bone Scan, possible metastatic dx.    Discussed with radiologist.  Bx of L iliac LN showed granulomatous lymphadenopathy with noncaseating granuloma.  Differential diagnosis includes sarcoidosis, infectious lymphadenopathy, and non-Hodgkin's lymphoma.    Dr. Rendon for open biopsy of left inguinal lymph node.  Sample of the lymph node biopsy, not placed in formalin, for AFB stain and Gram stain and AFB C/S and routine C/S.  Flow cytometry studies on the lymph node biopsy for leukemia lymphoma phenotyping were negative.    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  Dr. Vick. Of ID has seen her and is treating her with antibiotics.  [facility] called, that she had AFB on C/S of R inguinal LN.    Appt with Dr. Adams, for eval and management for Sarcoid?  Appt with Dr. Malave, for review of LN C/S, AFB status.  She is not compliant  in seeing specialists, she does keep Dr. Velázquez appt.    Polyclonal gammopathy  Anemia chronic Dx.  RTC 1 month.

## 2023-08-28 ENCOUNTER — OFFICE VISIT (OUTPATIENT)
Dept: HEMATOLOGY/ONCOLOGY | Facility: CLINIC | Age: 48
End: 2023-08-28
Payer: MEDICAID

## 2023-08-28 VITALS
BODY MASS INDEX: 30.01 KG/M2 | WEIGHT: 198 LBS | DIASTOLIC BLOOD PRESSURE: 59 MMHG | TEMPERATURE: 97 F | RESPIRATION RATE: 18 BRPM | HEART RATE: 88 BPM | HEIGHT: 68 IN | SYSTOLIC BLOOD PRESSURE: 113 MMHG

## 2023-08-28 DIAGNOSIS — M54.50 CHRONIC MIDLINE LOW BACK PAIN WITHOUT SCIATICA: ICD-10-CM

## 2023-08-28 DIAGNOSIS — G89.29 CHRONIC MIDLINE LOW BACK PAIN WITHOUT SCIATICA: ICD-10-CM

## 2023-08-28 DIAGNOSIS — R93.7 ABNORMAL BONE XRAY: ICD-10-CM

## 2023-08-28 DIAGNOSIS — N30.00 ACUTE CYSTITIS WITHOUT HEMATURIA: Primary | ICD-10-CM

## 2023-08-28 PROCEDURE — 99213 PR OFFICE/OUTPT VISIT, EST, LEVL III, 20-29 MIN: ICD-10-PCS | Mod: S$GLB,,, | Performed by: INTERNAL MEDICINE

## 2023-08-28 PROCEDURE — 3074F PR MOST RECENT SYSTOLIC BLOOD PRESSURE < 130 MM HG: ICD-10-PCS | Mod: CPTII,S$GLB,, | Performed by: INTERNAL MEDICINE

## 2023-08-28 PROCEDURE — 3008F PR BODY MASS INDEX (BMI) DOCUMENTED: ICD-10-PCS | Mod: CPTII,S$GLB,, | Performed by: INTERNAL MEDICINE

## 2023-08-28 PROCEDURE — 3078F DIAST BP <80 MM HG: CPT | Mod: CPTII,S$GLB,, | Performed by: INTERNAL MEDICINE

## 2023-08-28 PROCEDURE — 3078F PR MOST RECENT DIASTOLIC BLOOD PRESSURE < 80 MM HG: ICD-10-PCS | Mod: CPTII,S$GLB,, | Performed by: INTERNAL MEDICINE

## 2023-08-28 PROCEDURE — 3074F SYST BP LT 130 MM HG: CPT | Mod: CPTII,S$GLB,, | Performed by: INTERNAL MEDICINE

## 2023-08-28 PROCEDURE — 1159F PR MEDICATION LIST DOCUMENTED IN MEDICAL RECORD: ICD-10-PCS | Mod: CPTII,S$GLB,, | Performed by: INTERNAL MEDICINE

## 2023-08-28 PROCEDURE — 1159F MED LIST DOCD IN RCRD: CPT | Mod: CPTII,S$GLB,, | Performed by: INTERNAL MEDICINE

## 2023-08-28 PROCEDURE — 99213 OFFICE O/P EST LOW 20 MIN: CPT | Mod: S$GLB,,, | Performed by: INTERNAL MEDICINE

## 2023-08-28 PROCEDURE — 3008F BODY MASS INDEX DOCD: CPT | Mod: CPTII,S$GLB,, | Performed by: INTERNAL MEDICINE

## 2023-08-28 RX ORDER — HYDROCODONE BITARTRATE AND ACETAMINOPHEN 7.5; 325 MG/1; MG/1
1 TABLET ORAL EVERY 6 HOURS PRN
Qty: 120 TABLET | Refills: 0 | Status: SHIPPED | OUTPATIENT
Start: 2023-08-28 | End: 2023-10-04 | Stop reason: SDUPTHER

## 2023-08-28 RX ORDER — NITROFURANTOIN 25; 75 MG/1; MG/1
100 CAPSULE ORAL 2 TIMES DAILY
Qty: 14 CAPSULE | Refills: 0 | Status: SHIPPED | OUTPATIENT
Start: 2023-08-28 | End: 2023-09-04

## 2023-08-28 NOTE — PROGRESS NOTES
[facility] Hematology Oncology In Office subsequent Encounter Note  8/28/23    Subjective:      Patient ID:   Robina Potter                                         47 y.o. female                                             1975  Sergey Velázquez MD    Chief Complaint:   Anemia, LBP    HPI:  47 y.o. female with anemia 2nd decreased cellularity of bone marrow.  Chronic low back pain sx +.  Applies heat with relief.  Admits to increased LBP sx, pain in hand and knee joints  with damp, cold weather of last few days.    She had percutaneous needle biopsy of left inguinal lymph node.  The pathology report showed granulomatous lymphadenitis with noncaseating granuloma.  Differential diagnosis included sarcoidosis, infection, and lymphoma.  Radiologist recommended open biopsy with additional tissue to rule out lymphoma.    I made a referral for her to see Dr. Rendon for open lymph node biopsy.  Specimen would be submitted for AFB and Gram stain,  AFB culture and routine culture and flow cytometry studies for leukemia lymphoma phenotyping submitted on a fresh lymph node biopsy,   not placed in formalin.  Also the lymph node biopsy would be submitted for routine H and E stain per pathology.    I spoke with Dr. Alba of pathology.  The flow cytometry studies did not show lymphoma.  Dr. Marcos will be issuing the final pathology report.  The culture from the lymph node biopsy returned 3+ positive.  Achromobacter xylosoxidans.  She may have systemic involvement with this organism with lymphadenopathy and bone involvement.  Will make a referral to Infectious Disease specialist to see how this will be treated?    Anemia re evaluation showed Hgb 10.9, results reviewed with pt.  Anemia of chronic dx.  Polyclonal gammopathy.    She saw Dr. Vick of ID in [place].  He does not think this is TB.  He has given her antibiotics x's 14 days.  Overall pain sx are improved.    She lost her antibiotics, we called her  "pharmacist and reordered her bactrim BID for her.  We received call from [facility], that she has AFB on C/S of LN.    GERD +.  Pyelonephritis +.  Fibroids hx.  Recurrant UTI.  Cystoscopy done.    Seen for chest pain sx, CAT negative for PE.  Dr. Strong.  She has GI sx, on meds per Dr. Strong..  She saw Dr. Strong, may need hernia repair.  Saw Dr. Spence for migraine eval? Sx better.  Bladder eval negative.  Dr. Méndez.  She denies bladder sx now.    UTI sx, refill macrobid x's 7 days.  Refill meds for pain control.    Smoke 1/3 ppd.  Etoh no.  Job, security worker.    Cholecystectomy +, M0.    Mom  SLE.  1 child cerebral palsy.      ROS:   GEN: normal without any fever, night sweats or weight loss  HEENT:  HA's better  CV: normal with no CP, SOB, PND, SIMON or orthopnea  PULM: See HPI.  GI: GERD.  See history of present illness.  : Pyelonephritis hx. SEE HPI.  BREAST: normal with no mass, discharge, pain  SKIN: normal with no rash, erythema.    Review of patient's allergies indicates:  No Known Allergies    Current Outpatient Medications:     meloxicam (MOBIC) 7.5 MG tablet, Take 7.5 mg by mouth once daily., Disp: , Rfl:     diclofenac sodium (VOLTAREN) 1 % Gel, Apply 2 g topically 4 (four) times daily. (Patient not taking: Reported on 3/16/2023), Disp: 350 g, Rfl: 0    HYDROcodone-acetaminophen (NORCO) 7.5-325 mg per tablet, Take 1 tablet by mouth every 6 (six) hours as needed for Pain., Disp: 120 tablet, Rfl: 0    nitrofurantoin, macrocrystal-monohydrate, (MACROBID) 100 MG capsule, Take 1 capsule (100 mg total) by mouth 2 (two) times daily. for 7 days, Disp: 14 capsule, Rfl: 0          Objective:   Vitals:  Blood pressure (!) 113/59, pulse 88, temperature 97 °F (36.1 °C), resp. rate 18, height 5' 7.5" (1.715 m), weight 89.8 kg (198 lb).    Physical Examination:   GEN: no apparent distress, comfortable  HEAD: atraumatic and normocephalic  EYES: no pallor, no icterus  ENT: no pharyngeal erythema, external " ears WNL; no nasal discharge  NECK: no masses, thyroid normal, trachea midline, no LAD/LN's, supple  CV: RRR with no murmur; normal pulse; normal S1 and S2; no pedal edema  CHEST: Normal respiratory effort; CTAB;  no wheeze or crackles.  I believe the soft tissue over the sternum is full, towards the R chest wall.  ABDOM: nontender and nondistended; soft;  no rebound/guarding, L/S NP  MUSC/Skeletal: ROM normal; no crepitus; joints normal; no deformities  EXTREM: no clubbing, cyanosis, inflammation   SKIN: no rashes.  : no CVAT.  NEURO: grossly intact; motor/sensory WNL; no tremors  PSYCH: normal mood, affect and behavior  LYMPH: L inguinal LN      Radiology/Diagnostic Studies:    Mamm 1/2018 negative.  Bone marrow 2014, 25% cellularity.  IgG 2,000 now at 1862.  Hgb 9.5 WBC 4500, plt cnt 598,000.  Polyclonal gammopathy    CAT of chest mottled sternum, 2.2 axillary LN  Bone Scan multiple gilberto of uptake including sacrum.  CAT diffuse LN, sclerotic bone lesions  Mamm YONATHAN    Total protein 9.7, Hgb 10.4, lab eval ordered.    Assessment:   (1) 47 y.o. female with chronic anemia secondary to decreased bone marrow cellularity.  Blood counts low but stable.   Anemia of chronic dx.     (2)Chronic LBP.  Fullness at R sternal area.  Abnormal Bone Scan, possible metastatic dx.    Discussed with radiologist.  Bx of L iliac LN showed granulomatous lymphadenopathy with noncaseating granuloma.  Differential diagnosis includes sarcoidosis, infectious lymphadenopathy, and non-Hodgkin's lymphoma.    Dr. Rendon for open biopsy of left inguinal lymph node.  Sample of the lymph node biopsy, not placed in formalin, for AFB stain and Gram stain and AFB C/S and routine C/S.  Flow cytometry studies on the lymph node biopsy for leukemia lymphoma phenotyping were negative.    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  Dr. Bond Of ID has seen her and is treating her with antibiotics.  [facility] called, that she had AFB  on C/S of R inguinal LN.    Appt with Dr. Adams, for eval and management for Sarcoid?  Appt with Dr. Malave, for review of LN C/S, AFB status.  She is not compliant in seeing specialists, she does keep Dr. Velázquez appt.    Polyclonal gammopathy  Anemia chronic Dx.  RTC 1 month.

## 2023-10-04 DIAGNOSIS — R93.7 ABNORMAL BONE XRAY: ICD-10-CM

## 2023-10-04 DIAGNOSIS — G89.29 CHRONIC MIDLINE LOW BACK PAIN WITHOUT SCIATICA: ICD-10-CM

## 2023-10-04 DIAGNOSIS — M54.50 CHRONIC MIDLINE LOW BACK PAIN WITHOUT SCIATICA: ICD-10-CM

## 2023-10-04 RX ORDER — HYDROCODONE BITARTRATE AND ACETAMINOPHEN 7.5; 325 MG/1; MG/1
1 TABLET ORAL EVERY 6 HOURS PRN
Qty: 120 TABLET | Refills: 0 | Status: SHIPPED | OUTPATIENT
Start: 2023-10-04 | End: 2023-11-03

## 2024-10-02 ENCOUNTER — HOSPITAL ENCOUNTER (EMERGENCY)
Facility: HOSPITAL | Age: 49
Discharge: HOME | End: 2024-10-02
Attending: EMERGENCY MEDICINE
Payer: MEDICAID

## 2024-10-02 VITALS
RESPIRATION RATE: 14 BRPM | BODY MASS INDEX: 33.34 KG/M2 | HEIGHT: 68 IN | TEMPERATURE: 98 F | WEIGHT: 220 LBS | SYSTOLIC BLOOD PRESSURE: 132 MMHG | DIASTOLIC BLOOD PRESSURE: 75 MMHG | HEART RATE: 73 BPM | OXYGEN SATURATION: 99 %

## 2024-10-02 DIAGNOSIS — N30.00 ACUTE CYSTITIS WITHOUT HEMATURIA: Primary | ICD-10-CM

## 2024-10-02 DIAGNOSIS — N76.0 ACUTE VAGINITIS: ICD-10-CM

## 2024-10-02 LAB
ALBUMIN SERPL BCP-MCNC: 4.3 G/DL (ref 3.5–5.2)
ALP SERPL-CCNC: 58 U/L (ref 55–135)
ALT SERPL W/O P-5'-P-CCNC: 12 U/L (ref 10–44)
ANION GAP SERPL CALC-SCNC: 6 MMOL/L (ref 8–16)
AST SERPL-CCNC: 30 U/L (ref 10–40)
BACTERIA #/AREA URNS HPF: ABNORMAL /HPF
BACTERIA GENITAL QL WET PREP: ABNORMAL
BASOPHILS # BLD AUTO: 0.06 K/UL (ref 0–0.2)
BASOPHILS NFR BLD: 1.6 % (ref 0–1.9)
BILIRUB SERPL-MCNC: 0.2 MG/DL (ref 0.1–1)
BILIRUB UR QL STRIP: NEGATIVE
BUN SERPL-MCNC: 17 MG/DL (ref 6–20)
CALCIUM SERPL-MCNC: 9 MG/DL (ref 8.7–10.5)
CHLORIDE SERPL-SCNC: 102 MMOL/L (ref 95–110)
CLARITY UR: CLEAR
CLUE CELLS VAG QL WET PREP: ABNORMAL
CO2 SERPL-SCNC: 29 MMOL/L (ref 23–29)
COLOR UR: YELLOW
CREAT SERPL-MCNC: 1.2 MG/DL (ref 0.5–1.4)
DIFFERENTIAL METHOD BLD: ABNORMAL
EOSINOPHIL # BLD AUTO: 0.2 K/UL (ref 0–0.5)
EOSINOPHIL NFR BLD: 6.3 % (ref 0–8)
ERYTHROCYTE [DISTWIDTH] IN BLOOD BY AUTOMATED COUNT: 13.3 % (ref 11.5–14.5)
EST. GFR  (NO RACE VARIABLE): 55.1 ML/MIN/1.73 M^2
FILAMENT FUNGI VAG WET PREP-#/AREA: ABNORMAL
GLUCOSE SERPL-MCNC: 78 MG/DL (ref 70–110)
GLUCOSE UR QL STRIP: NEGATIVE
HCT VFR BLD AUTO: 31.1 % (ref 37–48.5)
HGB BLD-MCNC: 10.2 G/DL (ref 12–16)
HGB UR QL STRIP: ABNORMAL
HYALINE CASTS #/AREA URNS LPF: 3 /LPF
IMM GRANULOCYTES # BLD AUTO: 0.01 K/UL (ref 0–0.04)
IMM GRANULOCYTES NFR BLD AUTO: 0.3 % (ref 0–0.5)
KETONES UR QL STRIP: NEGATIVE
LEUKOCYTE ESTERASE UR QL STRIP: ABNORMAL
LYMPHOCYTES # BLD AUTO: 1.3 K/UL (ref 1–4.8)
LYMPHOCYTES NFR BLD: 35.1 % (ref 18–48)
MCH RBC QN AUTO: 30.5 PG (ref 27–31)
MCHC RBC AUTO-ENTMCNC: 32.8 G/DL (ref 32–36)
MCV RBC AUTO: 93 FL (ref 82–98)
MICROSCOPIC COMMENT: ABNORMAL
MONOCYTES # BLD AUTO: 0.4 K/UL (ref 0.3–1)
MONOCYTES NFR BLD: 9.5 % (ref 4–15)
NEUTROPHILS # BLD AUTO: 1.7 K/UL (ref 1.8–7.7)
NEUTROPHILS NFR BLD: 47.2 % (ref 38–73)
NITRITE UR QL STRIP: NEGATIVE
NRBC BLD-RTO: 0 /100 WBC
PH UR STRIP: 6 [PH] (ref 5–8)
PLATELET # BLD AUTO: 328 K/UL (ref 150–450)
PMV BLD AUTO: 9 FL (ref 9.2–12.9)
POTASSIUM SERPL-SCNC: 3.8 MMOL/L (ref 3.5–5.1)
PROT SERPL-MCNC: 8 G/DL (ref 6–8.4)
PROT UR QL STRIP: ABNORMAL
RBC # BLD AUTO: 3.34 M/UL (ref 4–5.4)
RBC #/AREA URNS HPF: 10 /HPF (ref 0–4)
SODIUM SERPL-SCNC: 137 MMOL/L (ref 136–145)
SP GR UR STRIP: 1.02 (ref 1–1.03)
SPECIMEN SOURCE: ABNORMAL
SQUAMOUS #/AREA URNS HPF: 0 /HPF
T VAGINALIS GENITAL QL WET PREP: ABNORMAL
URN SPEC COLLECT METH UR: ABNORMAL
UROBILINOGEN UR STRIP-ACNC: NEGATIVE EU/DL
WBC # BLD AUTO: 3.68 K/UL (ref 3.9–12.7)
WBC #/AREA URNS HPF: 11 /HPF (ref 0–5)
WBC #/AREA VAG WET PREP: ABNORMAL
YEAST GENITAL QL WET PREP: ABNORMAL

## 2024-10-02 PROCEDURE — 87205 SMEAR GRAM STAIN: CPT | Performed by: EMERGENCY MEDICINE

## 2024-10-02 PROCEDURE — 96374 THER/PROPH/DIAG INJ IV PUSH: CPT

## 2024-10-02 PROCEDURE — 85025 COMPLETE CBC W/AUTO DIFF WBC: CPT | Performed by: EMERGENCY MEDICINE

## 2024-10-02 PROCEDURE — 81001 URINALYSIS AUTO W/SCOPE: CPT | Performed by: EMERGENCY MEDICINE

## 2024-10-02 PROCEDURE — 63600175 PHARM REV CODE 636 W HCPCS: Performed by: EMERGENCY MEDICINE

## 2024-10-02 PROCEDURE — 80053 COMPREHEN METABOLIC PANEL: CPT | Performed by: EMERGENCY MEDICINE

## 2024-10-02 PROCEDURE — 87210 SMEAR WET MOUNT SALINE/INK: CPT | Performed by: EMERGENCY MEDICINE

## 2024-10-02 PROCEDURE — 99284 EMERGENCY DEPT VISIT MOD MDM: CPT | Mod: 25

## 2024-10-02 PROCEDURE — 25000003 PHARM REV CODE 250: Performed by: EMERGENCY MEDICINE

## 2024-10-02 PROCEDURE — 87081 CULTURE SCREEN ONLY: CPT | Performed by: EMERGENCY MEDICINE

## 2024-10-02 PROCEDURE — 87491 CHLMYD TRACH DNA AMP PROBE: CPT | Performed by: EMERGENCY MEDICINE

## 2024-10-02 RX ORDER — DOXYCYCLINE 100 MG/1
100 CAPSULE ORAL 2 TIMES DAILY
Qty: 20 CAPSULE | Refills: 0 | Status: SHIPPED | OUTPATIENT
Start: 2024-10-02 | End: 2024-10-12

## 2024-10-02 RX ADMIN — CEFTRIAXONE SODIUM 1 G: 1 INJECTION, POWDER, FOR SOLUTION INTRAMUSCULAR; INTRAVENOUS at 03:10

## 2024-10-02 NOTE — ED PROVIDER NOTES
Encounter Date: 10/2/2024       History     Chief Complaint   Patient presents with    Dysuria    Vaginal Discharge     Patient presents emergency department with reported dysuria associated with the vaginal discharge has been ongoing over last 3 days no fever chills no nausea vomiting diarrhea no abdominal pain no flank pain no pelvic pain she is postmenopausal that has not had periods since  she denies any antecedent illness she denies any lesions        Review of patient's allergies indicates:   Allergen Reactions    Morphine Itching     Past Medical History:   Diagnosis Date    Anemia     Back pain without sciatica     Thyroid disease      Past Surgical History:   Procedure Laterality Date    CHOLECYSTECTOMY       No family history on file.  Social History     Tobacco Use    Smoking status: Former     Current packs/day: 0.00     Types: Cigarettes     Quit date:      Years since quittin.7    Smokeless tobacco: Never   Substance Use Topics    Alcohol use: No    Drug use: No     Review of Systems   Constitutional:  Negative for chills and fever.   Gastrointestinal:  Negative for abdominal pain, nausea and vomiting.   Genitourinary:  Positive for dysuria and vaginal discharge. Negative for flank pain, frequency, genital sores, pelvic pain and vaginal pain.   All other systems reviewed and are negative.      Physical Exam     Initial Vitals [10/02/24 1013]   BP Pulse Resp Temp SpO2   (!) 159/87 83 17 98.3 °F (36.8 °C) 97 %      MAP       --         Physical Exam    Constitutional: She appears well-developed and well-nourished. No distress.   HENT:   Head: Normocephalic and atraumatic.   Right Ear: External ear normal.   Left Ear: External ear normal. Mouth/Throat: Oropharynx is clear and moist.   Eyes: Conjunctivae and EOM are normal. Pupils are equal, round, and reactive to light.   Neck: Neck supple.   Normal range of motion.  Cardiovascular:  Normal rate, regular rhythm, normal heart sounds and intact  distal pulses.           Pulmonary/Chest: Breath sounds normal. No respiratory distress.   Abdominal: Abdomen is soft. Bowel sounds are normal. There is no abdominal tenderness.   Genitourinary:    Genitourinary Comments: Tamara paramedic present for entire exam normal external female genitalia with scant yellow discharge in vaginal vault friable vaginal mucosa normal cervix     Musculoskeletal:         General: No edema. Normal range of motion.      Cervical back: Normal range of motion and neck supple.     Neurological: She is alert and oriented to person, place, and time. GCS score is 15. GCS eye subscore is 4. GCS verbal subscore is 5. GCS motor subscore is 6.   Skin: Skin is warm and dry. Capillary refill takes less than 2 seconds. No rash noted.   Psychiatric: She has a normal mood and affect. Her behavior is normal.         ED Course   Procedures  Labs Reviewed   VAGINAL SCREEN - Abnormal       Result Value    Trichomonas None      Clue Cells None      Budding Yeast None      Fungal Hyphae None      WBC - Vaginal Screen Many (*)     Bacteria - Vaginal Screen Many (*)     Wet Prep Source Vagina     CBC W/ AUTO DIFFERENTIAL - Abnormal    WBC 3.68 (*)     RBC 3.34 (*)     Hemoglobin 10.2 (*)     Hematocrit 31.1 (*)     MCV 93      MCH 30.5      MCHC 32.8      RDW 13.3      Platelets 328      MPV 9.0 (*)     Immature Granulocytes 0.3      Gran # (ANC) 1.7 (*)     Immature Grans (Abs) 0.01      Lymph # 1.3      Mono # 0.4      Eos # 0.2      Baso # 0.06      nRBC 0      Gran % 47.2      Lymph % 35.1      Mono % 9.5      Eosinophil % 6.3      Basophil % 1.6      Differential Method Automated     COMPREHENSIVE METABOLIC PANEL - Abnormal    Sodium 137      Potassium 3.8      Chloride 102      CO2 29      Glucose 78      BUN 17      Creatinine 1.2      Calcium 9.0      Total Protein 8.0      Albumin 4.3      Total Bilirubin 0.2      Alkaline Phosphatase 58      AST 30      ALT 12      eGFR 55.1 (*)     Anion Gap 6 (*)     URINALYSIS - Abnormal    Specimen UA Urine, Clean Catch      Color, UA Yellow      Appearance, UA Clear      pH, UA 6.0      Specific Gravity, UA 1.020      Protein, UA Trace (*)     Glucose, UA Negative      Ketones, UA Negative      Bilirubin (UA) Negative      Occult Blood UA 3+ (*)     Nitrite, UA Negative      Urobilinogen, UA Negative      Leukocytes, UA Trace (*)     Narrative:     Collection Type->Urine, Clean Catch   URINALYSIS MICROSCOPIC - Abnormal    RBC, UA 10 (*)     WBC, UA 11 (*)     Bacteria Rare      Squam Epithel, UA 0      Hyaline Casts, UA 3 (*)     Microscopic Comment SEE COMMENT      Narrative:     Collection Type->Urine, Clean Catch   CULTURE, GONOCOCCUS    Gram Stain Result Many WBC's      Gram Stain Result Many Gram negative rods      Gram Stain Result Rare Gram positive cocci      Gram Stain Result Moderate epithelial cells     C. TRACHOMATIS/N. GONORRHOEAE BY AMP DNA   POCT GLUCOSE MONITORING CONTINUOUS          Imaging Results    None          Medications   cefTRIAXone (ROCEPHIN) 1 g in dextrose 5 % 100 mL IVPB (ready to mix) (has no administration in time range)     Medical Decision Making  Laboratory evaluation shows evidence of bacteria in patient's urine many bacteria and white blood cells in vaginal vault no yeast or Trichomonas noted patient received a dose of Rocephin in the emergency department will continue doxycycline as an outpatient for likely vaginitis return to the ER for any worsened symptoms or new symptoms outpatient follow up at [facility]    Amount and/or Complexity of Data Reviewed  Labs: ordered. Decision-making details documented in ED Course.    Risk  Prescription drug management.                                      Clinical Impression:  Final diagnoses:  [N30.00] Acute cystitis without hematuria (Primary)  [N76.0] Acute vaginitis          ED Disposition Condition    Discharge Stable          ED Prescriptions       Medication Sig Dispense Start Date End Date  Auth. Provider    doxycycline (VIBRAMYCIN) 100 MG Cap Take 1 capsule (100 mg total) by mouth 2 (two) times daily. for 10 days 20 capsule 10/2/2024 10/12/2024 José Miguel Benavides MD          Follow-up Information       Follow up With Specialties Details Why Contact Info    [facility]  Call in 1 day  [address] [facility] [address]  [phone]               José Miguel Benavides MD  10/02/24 9753

## 2024-10-05 LAB
BACTERIA GENITAL AEROBE CULT: NORMAL
CHLAMYDIA, AMPLIFIED DNA: NEGATIVE
GRAM STN SPEC: NORMAL
N GONORRHOEAE, AMPLIFIED DNA: NEGATIVE

## 2025-03-10 ENCOUNTER — HOSPITAL ENCOUNTER (EMERGENCY)
Facility: HOSPITAL | Age: 50
Discharge: HOME | End: 2025-03-10
Attending: EMERGENCY MEDICINE
Payer: MEDICAID

## 2025-03-10 VITALS
OXYGEN SATURATION: 98 % | SYSTOLIC BLOOD PRESSURE: 178 MMHG | BODY MASS INDEX: 31.83 KG/M2 | HEIGHT: 68 IN | HEART RATE: 77 BPM | WEIGHT: 210 LBS | TEMPERATURE: 98 F | DIASTOLIC BLOOD PRESSURE: 89 MMHG | RESPIRATION RATE: 18 BRPM

## 2025-03-10 DIAGNOSIS — R93.89 ABNORMAL CT SCAN: ICD-10-CM

## 2025-03-10 DIAGNOSIS — R31.9 HEMATURIA, UNSPECIFIED TYPE: ICD-10-CM

## 2025-03-10 DIAGNOSIS — R10.30 LOWER ABDOMINAL PAIN: Primary | ICD-10-CM

## 2025-03-10 DIAGNOSIS — D64.9 ANEMIA, UNSPECIFIED TYPE: ICD-10-CM

## 2025-03-10 LAB
ALBUMIN SERPL BCP-MCNC: 4.1 G/DL (ref 3.5–5.2)
ALP SERPL-CCNC: 59 U/L (ref 40–150)
ALT SERPL W/O P-5'-P-CCNC: 16 U/L (ref 10–44)
ANION GAP SERPL CALC-SCNC: 11 MMOL/L (ref 8–16)
AST SERPL-CCNC: 38 U/L (ref 10–40)
BASOPHILS # BLD AUTO: 0.05 K/UL (ref 0–0.2)
BASOPHILS NFR BLD: 1 % (ref 0–1.9)
BILIRUB SERPL-MCNC: 0.4 MG/DL (ref 0.1–1)
BILIRUB UR QL STRIP: NEGATIVE
BUN SERPL-MCNC: 17 MG/DL (ref 6–20)
CALCIUM SERPL-MCNC: 9.7 MG/DL (ref 8.7–10.5)
CHLORIDE SERPL-SCNC: 101 MMOL/L (ref 95–110)
CLARITY UR: CLEAR
CO2 SERPL-SCNC: 26 MMOL/L (ref 23–29)
COLOR UR: YELLOW
CREAT SERPL-MCNC: 1.3 MG/DL (ref 0.5–1.4)
DIFFERENTIAL METHOD BLD: ABNORMAL
EOSINOPHIL # BLD AUTO: 0.2 K/UL (ref 0–0.5)
EOSINOPHIL NFR BLD: 3.8 % (ref 0–8)
ERYTHROCYTE [DISTWIDTH] IN BLOOD BY AUTOMATED COUNT: 12.9 % (ref 11.5–14.5)
EST. GFR  (NO RACE VARIABLE): 50 ML/MIN/1.73 M^2
GLUCOSE SERPL-MCNC: 79 MG/DL (ref 70–110)
GLUCOSE UR QL STRIP: NEGATIVE
HCT VFR BLD AUTO: 33.7 % (ref 37–48.5)
HCV AB SERPL QL IA: NEGATIVE
HGB BLD-MCNC: 10.9 G/DL (ref 12–16)
HGB UR QL STRIP: ABNORMAL
HIV 1+2 AB+HIV1 P24 AG SERPL QL IA: NEGATIVE
IMM GRANULOCYTES # BLD AUTO: 0.01 K/UL (ref 0–0.04)
IMM GRANULOCYTES NFR BLD AUTO: 0.2 % (ref 0–0.5)
KETONES UR QL STRIP: NEGATIVE
LEUKOCYTE ESTERASE UR QL STRIP: ABNORMAL
LYMPHOCYTES # BLD AUTO: 1.5 K/UL (ref 1–4.8)
LYMPHOCYTES NFR BLD: 32.1 % (ref 18–48)
MCH RBC QN AUTO: 30.8 PG (ref 27–31)
MCHC RBC AUTO-ENTMCNC: 32.3 G/DL (ref 32–36)
MCV RBC AUTO: 95 FL (ref 82–98)
MICROSCOPIC COMMENT: ABNORMAL
MONOCYTES # BLD AUTO: 0.5 K/UL (ref 0.3–1)
MONOCYTES NFR BLD: 10 % (ref 4–15)
NEUTROPHILS # BLD AUTO: 2.5 K/UL (ref 1.8–7.7)
NEUTROPHILS NFR BLD: 52.9 % (ref 38–73)
NITRITE UR QL STRIP: NEGATIVE
NRBC BLD-RTO: 0 /100 WBC
PH UR STRIP: 6 [PH] (ref 5–8)
PLATELET # BLD AUTO: 328 K/UL (ref 150–450)
PMV BLD AUTO: 8.8 FL (ref 9.2–12.9)
POTASSIUM SERPL-SCNC: 4 MMOL/L (ref 3.5–5.1)
PROT SERPL-MCNC: 8.8 G/DL (ref 6–8.4)
PROT UR QL STRIP: ABNORMAL
RBC # BLD AUTO: 3.54 M/UL (ref 4–5.4)
RBC #/AREA URNS HPF: 18 /HPF (ref 0–4)
SODIUM SERPL-SCNC: 138 MMOL/L (ref 136–145)
SP GR UR STRIP: 1.01 (ref 1–1.03)
SQUAMOUS #/AREA URNS HPF: 0 /HPF
URN SPEC COLLECT METH UR: ABNORMAL
UROBILINOGEN UR STRIP-ACNC: NEGATIVE EU/DL
WBC # BLD AUTO: 4.8 K/UL (ref 3.9–12.7)
WBC #/AREA URNS HPF: 4 /HPF (ref 0–5)

## 2025-03-10 PROCEDURE — 96374 THER/PROPH/DIAG INJ IV PUSH: CPT

## 2025-03-10 PROCEDURE — 96361 HYDRATE IV INFUSION ADD-ON: CPT

## 2025-03-10 PROCEDURE — 25000003 PHARM REV CODE 250

## 2025-03-10 PROCEDURE — 86803 HEPATITIS C AB TEST: CPT | Performed by: EMERGENCY MEDICINE

## 2025-03-10 PROCEDURE — 80053 COMPREHEN METABOLIC PANEL: CPT

## 2025-03-10 PROCEDURE — 85025 COMPLETE CBC W/AUTO DIFF WBC: CPT

## 2025-03-10 PROCEDURE — 99285 EMERGENCY DEPT VISIT HI MDM: CPT | Mod: 25

## 2025-03-10 PROCEDURE — 63600175 PHARM REV CODE 636 W HCPCS: Mod: TB,JZ

## 2025-03-10 PROCEDURE — 87389 HIV-1 AG W/HIV-1&-2 AB AG IA: CPT | Performed by: EMERGENCY MEDICINE

## 2025-03-10 PROCEDURE — 81000 URINALYSIS NONAUTO W/SCOPE: CPT

## 2025-03-10 RX ORDER — KETOROLAC TROMETHAMINE 30 MG/ML
15 INJECTION, SOLUTION INTRAMUSCULAR; INTRAVENOUS
Status: COMPLETED | OUTPATIENT
Start: 2025-03-10 | End: 2025-03-10

## 2025-03-10 RX ORDER — DICYCLOMINE HYDROCHLORIDE 20 MG/1
20 TABLET ORAL 2 TIMES DAILY PRN
Qty: 30 TABLET | Refills: 0 | Status: SHIPPED | OUTPATIENT
Start: 2025-03-10

## 2025-03-10 RX ADMIN — KETOROLAC TROMETHAMINE 15 MG: 30 INJECTION, SOLUTION INTRAMUSCULAR; INTRAVENOUS at 10:03

## 2025-03-10 RX ADMIN — SODIUM CHLORIDE 1000 ML: 9 INJECTION, SOLUTION INTRAVENOUS at 12:03

## 2025-03-10 NOTE — ED PROVIDER NOTES
Encounter Date: 3/10/2025       History     Chief Complaint   Patient presents with    Flank Pain     Bilateral flank pain and lower abdominal pain x3 days.      49-year-old female with a past medical history of back pain, anemia, and UTIs presents to ED for abdominal pain.  Patient states this started 2 months ago.  Patient complaining of lower sharp constant 10/10 pain radiates to her bilateral flanks.  Ibuprofen with little relief.  Denies anything making it worse.  Past abdominal surgical history of cholecystectomy.  Patient does not have menstrual cycles anymore.  Patient also constipation with last bowel movement yesterday and still passing gas, foul-smelling urine, and urgency.  Patient denies fever, sweats, chills, shortness breath, chest pain, nausea vomiting, diarrhea, dysuria, frequency, vaginal pain, vaginal bleeding, vaginal discharge, headaches, and rashes.      Review of patient's allergies indicates:   Allergen Reactions    Morphine Itching     Past Medical History:   Diagnosis Date    Anemia     Back pain without sciatica     Thyroid disease      Past Surgical History:   Procedure Laterality Date    CHOLECYSTECTOMY       No family history on file.  Social History[1]  Review of Systems   Constitutional:  Negative for chills, diaphoresis and fever.   Respiratory:  Negative for shortness of breath.    Cardiovascular:  Negative for chest pain.   Gastrointestinal:  Positive for abdominal pain and constipation. Negative for diarrhea, nausea and vomiting.   Genitourinary:  Positive for flank pain and urgency. Negative for decreased urine volume, difficulty urinating, dysuria, frequency, genital sores, hematuria, vaginal bleeding, vaginal discharge and vaginal pain.        (+)  foul-smelling urine   Musculoskeletal:  Negative for back pain.   Skin:  Negative for rash.   Neurological:  Negative for weakness and headaches.       Physical Exam     Initial Vitals [03/10/25 0931]   BP Pulse Resp Temp SpO2   (!)  141/88 77 18 98.4 °F (36.9 °C) 97 %      MAP       --         Physical Exam    Nursing note and vitals reviewed.  Constitutional: She appears well-developed and well-nourished. She is not diaphoretic. She is active. She does not appear ill. No distress.   HENT:   Head: Normocephalic and atraumatic.   Right Ear: External ear normal.   Left Ear: External ear normal.   Nose: Nose normal.   Eyes: Conjunctivae, EOM and lids are normal. Pupils are equal, round, and reactive to light. Right eye exhibits no discharge. Left eye exhibits no discharge.   Neck: Phonation normal. Neck supple.   Normal range of motion.   Full passive range of motion without pain.     Cardiovascular:  Normal rate and regular rhythm.           Pulmonary/Chest: Effort normal and breath sounds normal. No respiratory distress.   Abdominal: Abdomen is soft. She exhibits no distension and no mass. There is abdominal tenderness in the suprapubic area.   No right CVA tenderness.  No left CVA tenderness. There is no rebound and no guarding.   Musculoskeletal:         General: Normal range of motion.      Cervical back: Full passive range of motion without pain, normal range of motion and neck supple.     Neurological: She is alert and oriented to person, place, and time. She has normal strength. GCS eye subscore is 4. GCS verbal subscore is 5. GCS motor subscore is 6.   Skin: Skin is dry. Capillary refill takes less than 2 seconds.         ED Course   Procedures  Labs Reviewed   URINALYSIS, REFLEX TO URINE CULTURE - Abnormal       Result Value    Specimen UA Urine, Clean Catch      Color, UA Yellow      Appearance, UA Clear      pH, UA 6.0      Specific Gravity, UA 1.015      Protein, UA Trace (*)     Glucose, UA Negative      Ketones, UA Negative      Bilirubin (UA) Negative      Occult Blood UA 2+ (*)     Nitrite, UA Negative      Urobilinogen, UA Negative      Leukocytes, UA Trace (*)     Narrative:     Specimen Source->Urine   CBC W/ AUTO DIFFERENTIAL  - Abnormal    WBC 4.80      RBC 3.54 (*)     Hemoglobin 10.9 (*)     Hematocrit 33.7 (*)     MCV 95      MCH 30.8      MCHC 32.3      RDW 12.9      Platelets 328      MPV 8.8 (*)     Immature Granulocytes 0.2      Gran # (ANC) 2.5      Immature Grans (Abs) 0.01      Lymph # 1.5      Mono # 0.5      Eos # 0.2      Baso # 0.05      nRBC 0      Gran % 52.9      Lymph % 32.1      Mono % 10.0      Eosinophil % 3.8      Basophil % 1.0      Differential Method Automated     COMPREHENSIVE METABOLIC PANEL - Abnormal    Sodium 138      Potassium 4.0      Chloride 101      CO2 26      Glucose 79      BUN 17      Creatinine 1.3      Calcium 9.7      Total Protein 8.8 (*)     Albumin 4.1      Total Bilirubin 0.4      Alkaline Phosphatase 59      AST 38      ALT 16      eGFR 50 (*)     Anion Gap 11     URINALYSIS MICROSCOPIC - Abnormal    RBC, UA 18 (*)     WBC, UA 4      Squam Epithel, UA 0      Microscopic Comment SEE COMMENT      Narrative:     Specimen Source->Urine   HEPATITIS C ANTIBODY    Hepatitis C Ab Negative      Narrative:     Release to patient->Immediate   HIV 1 / 2 ANTIBODY    HIV 1/2 Ag/Ab Negative      Narrative:     Release to patient->Immediate          Imaging Results              CT Renal Stone Study ABD Pelvis WO (Final result)  Result time 03/10/25 12:08:49      Final result by Tyrel Plummer MD (03/10/25 12:08:49)                   Impression:      No acute abdominopelvic process.    Sclerotic process involving the sacrum and L2 spinous process.  Neoplastic process including metastatic disease and osseous lymphoma are considerations.  Further workup is recommended, noting reference to a previous bone scan in the epic medical record, performed at an outside facility with the images not available.    Cholecystectomy.      Electronically signed by: Tyrel Plummer MD  Date:    03/10/2025  Time:    12:08               Narrative:    EXAMINATION:  CT RENAL STONE STUDY ABD PELVIS WO    CLINICAL  HISTORY:  Flank pain, kidney stone suspected;    TECHNIQUE:  Low dose axial images, sagittal and coronal reformations were obtained from the lung bases to the pubic symphysis.  Contrast was not administered.    COMPARISON:  None    FINDINGS:  There is no urolithiasis or hydroureteronephrosis.    The liver, spleen, pancreas, and adrenal glands are unremarkable.  There has been a cholecystectomy.    The small bowel is normal caliber.  The appendix is normal.  The colon is unremarkable.  There is mild nonspecific haziness of central mesenteric fat.  Small fat containing periumbilical hernia is present.  Pelvic structures are unremarkable.  The aorta is normal caliber.    There is extensive sclerotic change throughout much of the sacrum, with some sparing of the left baron sacrum.  There is also diffuse erosive throughout the spinous process of L2.                                       Medications   sodium chloride 0.9% bolus 1,000 mL 1,000 mL (1,000 mLs Intravenous New Bag 3/10/25 1212)   ketorolac injection 15 mg (15 mg Intravenous Given 3/10/25 1045)     Medical Decision Making  49-year-old female with a past medical history of back pain, anemia, and UTIs presents to ED for abdominal pain.    Patient's chart and medical history reviewed.    Ddx:  UTI  Pyelonephritis  VENICE  Nephrolithiasis    Patient's vitals reviewed.  Afebrile, no respiratory distress, and nontoxic-appearing in the ED. patient had mild suprapubic tenderness palpation, otherwise unremarkable exam.  Patient given Toradol for pain.  CBC showed anemia of 3.54 with an H&H of 10.9 and 33.7 respectively, no need for transfusion at this time.  No leukocytosis.  CMP showed a GFR 50, otherwise overall unremarkable.  Normal BUN and creatinine.  Per chart review patient has history of GFR mid low 50s. UA unremarkable for infection, 18 red blood cells noted. CT showed No acute abdominopelvic process. Sclerotic process involving the sacrum and L2 spinous process.   Neoplastic process including metastatic disease and osseous lymphoma are considerations.  Further workup is recommended, noting reference to a previous bone scan in the epic medical record, performed at an outside facility with the images not available.  Discussed this case with Dr. Monterroso.  Per chart review patient has been followed by Heme-Onc for the last 10 years.  Discussed results with patient including possible  new sclerotic lesion of the sacrum and L2, she verbalized understanding.  Patient states she has not seen Dr. Mccain in a long time.  New referral sent and she will follow-up.  Patient also sent a referral for Urology for further management of this nonspecific yet hematuria.  Patient states she has had chronic hematuria but does not know why.  Patient is sent home on as needed for abdominal pain.  Patient will follow-up with her PCP, Urology, and Heme-Onc. Patient agrees with this plan. Discussed with her strict return precautions, she verbalized understanding. Patient is stable for discharge.       Amount and/or Complexity of Data Reviewed  External Data Reviewed: labs, radiology and notes.  Labs: ordered.  Radiology: ordered.    Risk  Prescription drug management.                                      Clinical Impression:  Final diagnoses:  [R10.30] Lower abdominal pain (Primary)  [R31.9] Hematuria, unspecified type  [D64.9] Anemia, unspecified type  [R93.89] Abnormal CT scan - clerotic process involving the sacrum and L2 spinous process          ED Disposition Condition    Discharge Stable          ED Prescriptions       Medication Sig Dispense Start Date End Date Auth. Provider    dicyclomine (BENTYL) 20 mg tablet Take 1 tablet (20 mg total) by mouth 2 (two) times daily as needed (abdominal pain). 30 tablet 3/10/2025 -- Holdsworth, Alayna, PA-C          Follow-up Information       Follow up With Specialties Details Why Contact Info    SEAN Mccain MD Hematology, Oncology, Hematology and  Oncology Schedule an appointment as soon as possible for a visit   [address] [facility] [address]  [facility] [address]  [phone]                   [1]   Social History  Tobacco Use    Smoking status: Former     Current packs/day: 0.00     Types: Cigarettes     Quit date:      Years since quittin.1    Smokeless tobacco: Never   Substance Use Topics    Alcohol use: No    Drug use: No        Holdsworth, Alayna, PA-C  03/10/25 1256

## 2025-03-10 NOTE — DISCHARGE INSTRUCTIONS

## 2025-03-18 ENCOUNTER — OFFICE VISIT (OUTPATIENT)
Facility: CLINIC | Age: 50
End: 2025-03-18
Payer: MEDICAID

## 2025-03-18 VITALS
OXYGEN SATURATION: 98 % | HEART RATE: 77 BPM | BODY MASS INDEX: 32.28 KG/M2 | HEIGHT: 68 IN | RESPIRATION RATE: 16 BRPM | TEMPERATURE: 97 F | WEIGHT: 213 LBS | SYSTOLIC BLOOD PRESSURE: 122 MMHG | DIASTOLIC BLOOD PRESSURE: 70 MMHG

## 2025-03-18 DIAGNOSIS — Z12.31 SCREENING MAMMOGRAM, ENCOUNTER FOR: ICD-10-CM

## 2025-03-18 DIAGNOSIS — R93.7 ABNORMAL BONE XRAY: Primary | ICD-10-CM

## 2025-03-18 DIAGNOSIS — D63.8 ANEMIA IN OTHER CHRONIC DISEASES CLASSIFIED ELSEWHERE: ICD-10-CM

## 2025-03-18 PROCEDURE — 99215 OFFICE O/P EST HI 40 MIN: CPT | Mod: S$PBB,,, | Performed by: INTERNAL MEDICINE

## 2025-03-18 PROCEDURE — 1159F MED LIST DOCD IN RCRD: CPT | Mod: CPTII,,, | Performed by: INTERNAL MEDICINE

## 2025-03-18 PROCEDURE — 3074F SYST BP LT 130 MM HG: CPT | Mod: CPTII,,, | Performed by: INTERNAL MEDICINE

## 2025-03-18 PROCEDURE — 99999 PR PBB SHADOW E&M-EST. PATIENT-LVL IV: CPT | Mod: PBBFAC,,, | Performed by: INTERNAL MEDICINE

## 2025-03-18 PROCEDURE — 3008F BODY MASS INDEX DOCD: CPT | Mod: CPTII,,, | Performed by: INTERNAL MEDICINE

## 2025-03-18 PROCEDURE — 3078F DIAST BP <80 MM HG: CPT | Mod: CPTII,,, | Performed by: INTERNAL MEDICINE

## 2025-03-18 PROCEDURE — 99214 OFFICE O/P EST MOD 30 MIN: CPT | Mod: PBBFAC,PN | Performed by: INTERNAL MEDICINE

## 2025-03-22 DIAGNOSIS — R93.7 ABNORMAL BONE XRAY: Primary | ICD-10-CM

## 2025-03-22 DIAGNOSIS — D63.8 ANEMIA IN OTHER CHRONIC DISEASES CLASSIFIED ELSEWHERE: ICD-10-CM

## 2025-03-23 NOTE — H&P (VIEW-ONLY)
[facility] [address] hEMATOLOGY oNCOLOGY iN oFFICE sUBSEQUENT eNCOUNTER nOTE    3/18/25    Subjective:      Patient ID:   Robina Potter                                         49 y.o. female                                             1975  Sergey Velázquez MD    Chief Complaint:   Anemia, LBP    HPI:  49 y.o. female with anemia 2nd decreased cellularity of bone marrow.  Chronic low back pain sx +.  Applies heat with relief.  Admits to increased LBP sx, pain in hand and knee joints  with damp, cold weather of last few days.    She had percutaneous needle biopsy of left inguinal lymph node.  The pathology report showed granulomatous lymphadenitis with noncaseating granuloma.  Differential diagnosis included sarcoidosis, infection, and lymphoma.  Radiologist recommended open biopsy with additional tissue to rule out lymphoma.    I made a referral for her to see Dr. Rendon for open lymph node biopsy.  Specimen would be submitted for AFB and Gram stain,  AFB culture and routine culture and flow cytometry studies for leukemia lymphoma phenotyping submitted on a fresh lymph node biopsy,   not placed in formalin.  Also the lymph node biopsy would be submitted for routine H and E stain per pathology.    I spoke with Dr. Alba of pathology.  The flow cytometry studies did not show lymphoma.  Dr. Marcos will be issuing the final pathology report.  The culture from the lymph node biopsy returned 3+ positive.  Achromobacter xylosoxidans.  She may have systemic involvement with this organism with lymphadenopathy and bone involvement.  Will make a referral to Infectious Disease specialist to see how this will be treated?    Anemia re evaluation showed Hgb 10.9, results reviewed with pt.  Anemia of chronic dx.  Polyclonal gammopathy.    She saw Dr. Vick of ID in [place].  He does not think this is TB.  He has given her antibiotics x's 14 days.  Overall pain sx are improved.    She lost her antibiotics, we called her  pharmacist and reordered her bactrim BID for her.  We received call from [facility], that she has AFB on C/S of LN.    GERD +.  Pyelonephritis +.  Fibroids hx.  Recurrant UTI.  Cystoscopy done.    Seen for chest pain sx, CAT negative for PE.  Dr. Strong.  She has GI sx, on meds per Dr. Strong..  She saw Dr. Strong, may need hernia repair.  Saw Dr. Spence for migraine eval? Sx better.  Bladder eval negative.  Dr. Méndez.  She denies bladder sx now.    UTI sx, refill macrobid x's 7 days.  Refill meds for pain control.    Smoke 1/3 ppd.  Etoh no.  Job, security worker.    Cholecystectomy +, M0.    Mom  SLE.  1 child cerebral palsy.    Last seen 1-1/2 years ago.  She recently had a CT scan looking for kidney stones and this shows sclerotic area at the L2 spine and at the left sacrum, raising question of malignancy.  Discussed with her and will schedule bone marrow aspiration and biopsy at the left sacrum.  She is also due for her mammogram at [facility] and she will follow-up here in 6 weeks' time.      ROS:   GEN: normal without any fever, night sweats or weight loss  HEENT:  HA's better  CV: normal with no CP, SOB, PND, SIMON or orthopnea  PULM: See HPI.  GI: GERD.  See history of present illness.  : Pyelonephritis hx. SEE HPI.  BREAST: normal with no mass, discharge, pain  SKIN: normal with no rash, erythema.    Review of patient's allergies indicates:  No Known Allergies    Current Outpatient Medications:     dicyclomine (BENTYL) 20 mg tablet, Take 1 tablet (20 mg total) by mouth 2 (two) times daily as needed (abdominal pain)., Disp: 30 tablet, Rfl: 0    HYDROcodone-acetaminophen (NORCO) 7.5-325 mg per tablet, Take 1 tablet by mouth every 6 (six) hours as needed for Pain., Disp: 120 tablet, Rfl: 0    meloxicam (MOBIC) 7.5 MG tablet, Take 7.5 mg by mouth once daily., Disp: , Rfl:     diclofenac sodium (VOLTAREN) 1 % Gel, Apply 2 g topically 4 (four) times daily. (Patient not taking: Reported on 3/18/2025), Disp: 350  "g, Rfl: 0          Objective:   Vitals:  Blood pressure 122/70, pulse 77, temperature 97.4 °F (36.3 °C), temperature source Temporal, resp. rate 16, height 5' 8" (1.727 m), weight 96.6 kg (213 lb), SpO2 98%.    Physical Examination:   GEN: no apparent distress, comfortable  HEAD: atraumatic and normocephalic  EYES: no pallor, no icterus  ENT: no pharyngeal erythema, external ears WNL; no nasal discharge  NECK: no masses, thyroid normal, trachea midline, no LAD/LN's, supple  CV: RRR with no murmur; normal pulse; normal S1 and S2; no pedal edema  CHEST: Normal respiratory effort; CTAB;  no wheeze or crackles.  I believe the soft tissue over the sternum is full, towards the R chest wall.  ABDOM: nontender and nondistended; soft;  no rebound/guarding, L/S NP  MUSC/Skeletal: ROM normal; no crepitus; joints normal; no deformities  EXTREM: no clubbing, cyanosis, inflammation   SKIN: no rashes.  : no CVAT.  NEURO: grossly intact; motor/sensory WNL; no tremors  PSYCH: normal mood, affect and behavior  LYMPH: L inguinal LN      Radiology/Diagnostic Studies:    Mamm 1/2018 negative.  Bone marrow 2014, 25% cellularity.  IgG 2,000 now at 1862.  Hgb 9.5 WBC 4500, plt cnt 598,000.  Polyclonal gammopathy    CAT of chest mottled sternum, 2.2 axillary LN  Bone Scan multiple gilberto of uptake including sacrum.  CAT diffuse LN, sclerotic bone lesions  Mamm YONATHAN    Total protein 9.7, Hgb 10.4, lab eval ordered.    CT scan showed L2 sclerotic area and left spine sclerotic area, raising the question of metastatic disease.  Or malignancy.    Assessment:   (1) 49 y.o. female with chronic anemia secondary to decreased bone marrow cellularity.  Blood counts low but stable.   Anemia of chronic dx.     (2)Chronic LBP.  Fullness at R sternal area.  Abnormal Bone Scan, possible metastatic dx.    Discussed with radiologist.  Bx of L iliac LN showed granulomatous lymphadenopathy with noncaseating granuloma.  Differential diagnosis includes " sarcoidosis, infectious lymphadenopathy, and non-Hodgkin's lymphoma.    Dr. Rendon for open biopsy of left inguinal lymph node.  Sample of the lymph node biopsy, not placed in formalin, for AFB stain and Gram stain and AFB C/S and routine C/S.  Flow cytometry studies on the lymph node biopsy for leukemia lymphoma phenotyping were negative.    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  Dr. Vick. Of ID has seen her and is treating her with antibiotics.  [facility] called, that she had AFB on C/S of R inguinal LN.    Appt with Dr. Adams, for eval and management for Sarcoid?  Appt with Dr. Malave, for review of LN C/S, AFB status.  She is not compliant in seeing specialists, she does keep Dr. Velázquez appt.    Polyclonal gammopathy  Anemia chronic Dx.    Schedule bone marrow aspiration and biopsy for H and E stain and chromosomal analysis for the evaluation of abnormal CT scan bone x-rays and for the evaluation of chronic anemia.    RTC 6 weeks.  Paramjit Maldonado MD  [facility] 3/18/25

## 2025-03-23 NOTE — PROGRESS NOTES
[facility] [address] hEMATOLOGY oNCOLOGY iN oFFICE sUBSEQUENT eNCOUNTER nOTE    3/18/25    Subjective:      Patient ID:   Robina Potter                                         49 y.o. female                                             1975  Sergey Velázquez MD    Chief Complaint:   Anemia, LBP    HPI:  49 y.o. female with anemia 2nd decreased cellularity of bone marrow.  Chronic low back pain sx +.  Applies heat with relief.  Admits to increased LBP sx, pain in hand and knee joints  with damp, cold weather of last few days.    She had percutaneous needle biopsy of left inguinal lymph node.  The pathology report showed granulomatous lymphadenitis with noncaseating granuloma.  Differential diagnosis included sarcoidosis, infection, and lymphoma.  Radiologist recommended open biopsy with additional tissue to rule out lymphoma.    I made a referral for her to see Dr. Rendon for open lymph node biopsy.  Specimen would be submitted for AFB and Gram stain,  AFB culture and routine culture and flow cytometry studies for leukemia lymphoma phenotyping submitted on a fresh lymph node biopsy,   not placed in formalin.  Also the lymph node biopsy would be submitted for routine H and E stain per pathology.    I spoke with Dr. Alba of pathology.  The flow cytometry studies did not show lymphoma.  Dr. Marcos will be issuing the final pathology report.  The culture from the lymph node biopsy returned 3+ positive.  Achromobacter xylosoxidans.  She may have systemic involvement with this organism with lymphadenopathy and bone involvement.  Will make a referral to Infectious Disease specialist to see how this will be treated?    Anemia re evaluation showed Hgb 10.9, results reviewed with pt.  Anemia of chronic dx.  Polyclonal gammopathy.    She saw Dr. Vick of ID in [place].  He does not think this is TB.  He has given her antibiotics x's 14 days.  Overall pain sx are improved.    She lost her antibiotics, we called her  pharmacist and reordered her bactrim BID for her.  We received call from [facility], that she has AFB on C/S of LN.    GERD +.  Pyelonephritis +.  Fibroids hx.  Recurrant UTI.  Cystoscopy done.    Seen for chest pain sx, CAT negative for PE.  Dr. Strong.  She has GI sx, on meds per Dr. Strong..  She saw Dr. Strong, may need hernia repair.  Saw Dr. Spence for migraine eval? Sx better.  Bladder eval negative.  Dr. Méndez.  She denies bladder sx now.    UTI sx, refill macrobid x's 7 days.  Refill meds for pain control.    Smoke 1/3 ppd.  Etoh no.  Job, security worker.    Cholecystectomy +, M0.    Mom  SLE.  1 child cerebral palsy.    Last seen 1-1/2 years ago.  She recently had a CT scan looking for kidney stones and this shows sclerotic area at the L2 spine and at the left sacrum, raising question of malignancy.  Discussed with her and will schedule bone marrow aspiration and biopsy at the left sacrum.  She is also due for her mammogram at [facility] and she will follow-up here in 6 weeks' time.      ROS:   GEN: normal without any fever, night sweats or weight loss  HEENT:  HA's better  CV: normal with no CP, SOB, PND, SIMON or orthopnea  PULM: See HPI.  GI: GERD.  See history of present illness.  : Pyelonephritis hx. SEE HPI.  BREAST: normal with no mass, discharge, pain  SKIN: normal with no rash, erythema.    Review of patient's allergies indicates:  No Known Allergies    Current Outpatient Medications:     dicyclomine (BENTYL) 20 mg tablet, Take 1 tablet (20 mg total) by mouth 2 (two) times daily as needed (abdominal pain)., Disp: 30 tablet, Rfl: 0    HYDROcodone-acetaminophen (NORCO) 7.5-325 mg per tablet, Take 1 tablet by mouth every 6 (six) hours as needed for Pain., Disp: 120 tablet, Rfl: 0    meloxicam (MOBIC) 7.5 MG tablet, Take 7.5 mg by mouth once daily., Disp: , Rfl:     diclofenac sodium (VOLTAREN) 1 % Gel, Apply 2 g topically 4 (four) times daily. (Patient not taking: Reported on 3/18/2025), Disp: 350  "g, Rfl: 0          Objective:   Vitals:  Blood pressure 122/70, pulse 77, temperature 97.4 °F (36.3 °C), temperature source Temporal, resp. rate 16, height 5' 8" (1.727 m), weight 96.6 kg (213 lb), SpO2 98%.    Physical Examination:   GEN: no apparent distress, comfortable  HEAD: atraumatic and normocephalic  EYES: no pallor, no icterus  ENT: no pharyngeal erythema, external ears WNL; no nasal discharge  NECK: no masses, thyroid normal, trachea midline, no LAD/LN's, supple  CV: RRR with no murmur; normal pulse; normal S1 and S2; no pedal edema  CHEST: Normal respiratory effort; CTAB;  no wheeze or crackles.  I believe the soft tissue over the sternum is full, towards the R chest wall.  ABDOM: nontender and nondistended; soft;  no rebound/guarding, L/S NP  MUSC/Skeletal: ROM normal; no crepitus; joints normal; no deformities  EXTREM: no clubbing, cyanosis, inflammation   SKIN: no rashes.  : no CVAT.  NEURO: grossly intact; motor/sensory WNL; no tremors  PSYCH: normal mood, affect and behavior  LYMPH: L inguinal LN      Radiology/Diagnostic Studies:    Mamm 1/2018 negative.  Bone marrow 2014, 25% cellularity.  IgG 2,000 now at 1862.  Hgb 9.5 WBC 4500, plt cnt 598,000.  Polyclonal gammopathy    CAT of chest mottled sternum, 2.2 axillary LN  Bone Scan multiple gilberto of uptake including sacrum.  CAT diffuse LN, sclerotic bone lesions  Mamm YONATHAN    Total protein 9.7, Hgb 10.4, lab eval ordered.    CT scan showed L2 sclerotic area and left spine sclerotic area, raising the question of metastatic disease.  Or malignancy.    Assessment:   (1) 49 y.o. female with chronic anemia secondary to decreased bone marrow cellularity.  Blood counts low but stable.   Anemia of chronic dx.     (2)Chronic LBP.  Fullness at R sternal area.  Abnormal Bone Scan, possible metastatic dx.    Discussed with radiologist.  Bx of L iliac LN showed granulomatous lymphadenopathy with noncaseating granuloma.  Differential diagnosis includes " sarcoidosis, infectious lymphadenopathy, and non-Hodgkin's lymphoma.    Dr. Rendon for open biopsy of left inguinal lymph node.  Sample of the lymph node biopsy, not placed in formalin, for AFB stain and Gram stain and AFB C/S and routine C/S.  Flow cytometry studies on the lymph node biopsy for leukemia lymphoma phenotyping were negative.    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  Dr. Vick. Of ID has seen her and is treating her with antibiotics.  [facility] called, that she had AFB on C/S of R inguinal LN.    Appt with Dr. Adams, for eval and management for Sarcoid?  Appt with Dr. Malave, for review of LN C/S, AFB status.  She is not compliant in seeing specialists, she does keep Dr. Velázquez appt.    Polyclonal gammopathy  Anemia chronic Dx.    Schedule bone marrow aspiration and biopsy for H and E stain and chromosomal analysis for the evaluation of abnormal CT scan bone x-rays and for the evaluation of chronic anemia.    RTC 6 weeks.  Paramjit Maldonado MD  [facility] 3/18/25

## 2025-03-28 ENCOUNTER — OFFICE VISIT (OUTPATIENT)
Dept: UROLOGY | Facility: CLINIC | Age: 50
End: 2025-03-28
Payer: MEDICAID

## 2025-03-28 VITALS — HEIGHT: 68 IN | BODY MASS INDEX: 32.27 KG/M2 | WEIGHT: 212.94 LBS

## 2025-03-28 DIAGNOSIS — R31.29 HEMATURIA, MICROSCOPIC: ICD-10-CM

## 2025-03-28 DIAGNOSIS — R31.9 HEMATURIA, UNSPECIFIED TYPE: Primary | ICD-10-CM

## 2025-03-28 LAB
BACTERIA #/AREA URNS AUTO: NORMAL /HPF
BILIRUBIN, UA POC OHS: NEGATIVE
BLOOD, UA POC OHS: ABNORMAL
CLARITY, UA POC OHS: CLEAR
COLOR, UA POC OHS: YELLOW
GLUCOSE, UA POC OHS: NEGATIVE
KETONES, UA POC OHS: NEGATIVE
LEUKOCYTES, UA POC OHS: NEGATIVE
MICROSCOPIC COMMENT: NORMAL
NITRITE, UA POC OHS: NEGATIVE
PH, UA POC OHS: 5.5
PROTEIN, UA POC OHS: 30
RBC #/AREA URNS AUTO: 3 /HPF (ref 0–4)
SPECIFIC GRAVITY, UA POC OHS: 1.01
SQUAMOUS #/AREA URNS AUTO: 0 /HPF
UROBILINOGEN, UA POC OHS: 0.2
WBC #/AREA URNS AUTO: 2 /HPF (ref 0–5)

## 2025-03-28 PROCEDURE — 87086 URINE CULTURE/COLONY COUNT: CPT | Performed by: NURSE PRACTITIONER

## 2025-03-28 PROCEDURE — 99999 PR PBB SHADOW E&M-EST. PATIENT-LVL III: CPT | Mod: PBBFAC,,, | Performed by: NURSE PRACTITIONER

## 2025-03-28 PROCEDURE — 99213 OFFICE O/P EST LOW 20 MIN: CPT | Mod: PBBFAC,PO | Performed by: NURSE PRACTITIONER

## 2025-03-28 PROCEDURE — 81001 URINALYSIS AUTO W/SCOPE: CPT | Performed by: NURSE PRACTITIONER

## 2025-03-28 NOTE — PROGRESS NOTES
[facility] Note  Staff: WILMER Cummings    PCP: N/A    Chief Complaint: Microscopic Hematuria    Subjective:        HPI: Robina Potter is a 49 y.o. female NEW PT presents with c/o microhematuria at this time.    UA on arrival today showed Large blood, 5.5, 1.015, 30 of Protein.  Pt denies dysuria and gross hematuria today.  History of Kidney Stones?:  None  Constipation issues?:  None  Former Smoker  Family hx of  Cancers?:None    REVIEW OF SYSTEMS:  A comprehensive 10 system review was performed and is negative except as noted above in HPI    PMHx:  Past Medical History:   Diagnosis Date    Anemia     Back pain without sciatica     Thyroid disease      PSHx:  Past Surgical History:   Procedure Laterality Date    CHOLECYSTECTOMY       Allergies:  Morphine    Medications: reviewed   Objective:   There were no vitals filed for this visit.    Physical Exam  Constitutional:       Appearance: She is well-developed.   HENT:      Head: Normocephalic and atraumatic.   Eyes:      Conjunctiva/sclera: Conjunctivae normal.      Pupils: Pupils are equal, round, and reactive to light.   Cardiovascular:      Rate and Rhythm: Normal rate and regular rhythm.      Heart sounds: Normal heart sounds.   Pulmonary:      Effort: Pulmonary effort is normal.      Breath sounds: Normal breath sounds.   Abdominal:      General: Bowel sounds are normal.      Palpations: Abdomen is soft.   Musculoskeletal:         General: Normal range of motion.      Cervical back: Normal range of motion and neck supple.   Skin:     General: Skin is warm and dry.   Neurological:      Mental Status: She is alert and oriented to person, place, and time.      Deep Tendon Reflexes: Reflexes are normal and symmetric.   Psychiatric:         Behavior: Behavior normal.         Thought Content: Thought content normal.         Judgment: Judgment normal.       Assessment:       1. Hematuria, unspecified type    2. Hematuria, microscopic           Plan:      UA Micro and Urine Culture to be processed.    Per AUA Guidelines, Microhematuria  -send ua for microscopic analysis, if urinalysis is positive for greater than 3 red blood cells per high power field, will workup with a CT urogram and cystoscopy      F/u TBD    [facility]: Active    Tiara Skaggs, FNP-C

## 2025-03-31 ENCOUNTER — TELEPHONE (OUTPATIENT)
Dept: UROLOGY | Facility: CLINIC | Age: 50
End: 2025-03-31
Payer: MEDICAID

## 2025-03-31 ENCOUNTER — RESULTS FOLLOW-UP (OUTPATIENT)
Dept: UROLOGY | Facility: CLINIC | Age: 50
End: 2025-03-31

## 2025-03-31 DIAGNOSIS — R31.29 HEMATURIA, MICROSCOPIC: Primary | ICD-10-CM

## 2025-03-31 NOTE — TELEPHONE ENCOUNTER
----- Message from Chen sent at 3/31/2025  9:46 AM CDT -----  Contact: self  Type:  Patient Returning CallWho Called:  pt Who Left Message for Patient:  not sure if it was Tiara Does the patient know what this is regarding?:  resultsBest Call Back Number:  [phone] Information:  thanks

## 2025-04-03 ENCOUNTER — TELEPHONE (OUTPATIENT)
Dept: RADIOLOGY | Facility: HOSPITAL | Age: 50
End: 2025-04-03

## 2025-04-08 ENCOUNTER — TELEPHONE (OUTPATIENT)
Dept: INTERVENTIONAL RADIOLOGY/VASCULAR | Facility: HOSPITAL | Age: 50
End: 2025-04-08

## 2025-04-08 NOTE — NURSING
Radiology Pre-Procedural Instructions- [facility]    Radiology Nurse contacted patient to provide instructions for scheduled CT Bone Marrow Aspiration at 1100 on 4/17/25. Unable to reach patient after multiple attempts.    Patient needs to be instructed to arrive no later than 0930 am to outpatient registration.   Registration will direct patient to outpatient lab for pre-op lab work if required.  Following labs, patient needs to check in at the surgery waiting room desk located on the second floor.     Patient instructed to remain NPO after midnight with the exception of approved essential meds taken with a small sip of water.  Instruct patient to bathe the night before and the morning of their procedure with an anti bacterial soap or Hibiclens.   Patient to be made aware of their responsibility to make post transportation arrangements home by a responsible adult.

## 2025-04-10 DIAGNOSIS — G89.29 CHRONIC MIDLINE LOW BACK PAIN WITHOUT SCIATICA: ICD-10-CM

## 2025-04-10 DIAGNOSIS — R93.7 ABNORMAL BONE XRAY: ICD-10-CM

## 2025-04-10 DIAGNOSIS — M54.50 CHRONIC MIDLINE LOW BACK PAIN WITHOUT SCIATICA: ICD-10-CM

## 2025-04-10 RX ORDER — HYDROCODONE BITARTRATE AND ACETAMINOPHEN 7.5; 325 MG/1; MG/1
1 TABLET ORAL EVERY 6 HOURS PRN
Qty: 120 TABLET | Refills: 0 | Status: SHIPPED | OUTPATIENT
Start: 2025-04-10 | End: 2025-05-10

## 2025-04-10 NOTE — TELEPHONE ENCOUNTER
----- Message from Stacie sent at 4/9/2025  9:24 AM CDT -----  HYDROcodone-acetaminophen (NORCO) 7.5-325 mg per tablet 120 tablet  10/4/2023 3/18/2025Pt needs a copy of this order and can come pick it up this week.

## 2025-04-11 ENCOUNTER — TELEPHONE (OUTPATIENT)
Facility: CLINIC | Age: 50
End: 2025-04-11
Payer: MEDICAID

## 2025-04-11 NOTE — TELEPHONE ENCOUNTER
"Spoke to Patient.  I informed her she  would need to contact [facility] for any of her past visits or medications. She stated "I can't just get it from you?"  I added further explanation as to obtaining a consent and communication with the Medical Record staff.  She stated "I will call you back" and hung up.   "

## 2025-04-11 NOTE — TELEPHONE ENCOUNTER
----- Message from Marianna sent at 4/9/2025  9:45 AM CDT -----  The patient called and stated that she needs a copy of her prescription for Norco from 5/2023. She said her  needs it for court.

## 2025-04-16 ENCOUNTER — TELEPHONE (OUTPATIENT)
Facility: CLINIC | Age: 50
End: 2025-04-16
Payer: MEDICAID

## 2025-04-16 NOTE — TELEPHONE ENCOUNTER
Spoke to Patient's niece regarding Patient's bone marrow BX.  I informed her radiology was trying to reach her to confirm her appointment as well as give her instructions for procedure.  She said she would contact Patient and ask her to contact our office or [facility].  I asked that she phone me so I could transfer her to the correct department. Patient called and she was transferred to Harriet MUNIZ RN.

## 2025-04-17 ENCOUNTER — HOSPITAL ENCOUNTER (OUTPATIENT)
Dept: RADIOLOGY | Facility: HOSPITAL | Age: 50
Discharge: HOME | End: 2025-04-17
Attending: INTERNAL MEDICINE
Payer: MEDICAID

## 2025-04-17 VITALS
SYSTOLIC BLOOD PRESSURE: 156 MMHG | TEMPERATURE: 97 F | RESPIRATION RATE: 16 BRPM | HEART RATE: 66 BPM | WEIGHT: 202 LBS | DIASTOLIC BLOOD PRESSURE: 86 MMHG | HEIGHT: 68 IN | BODY MASS INDEX: 30.62 KG/M2 | OXYGEN SATURATION: 98 %

## 2025-04-17 DIAGNOSIS — D63.8 ANEMIA IN OTHER CHRONIC DISEASES CLASSIFIED ELSEWHERE: ICD-10-CM

## 2025-04-17 DIAGNOSIS — R93.7 ABNORMAL BONE XRAY: ICD-10-CM

## 2025-04-17 DIAGNOSIS — D64.9 ANEMIA: ICD-10-CM

## 2025-04-17 PROCEDURE — 38222 DX BONE MARROW BX & ASPIR: CPT | Mod: RT,,, | Performed by: RADIOLOGY

## 2025-04-17 PROCEDURE — 63600175 PHARM REV CODE 636 W HCPCS: Performed by: RADIOLOGY

## 2025-04-17 PROCEDURE — 63600175 PHARM REV CODE 636 W HCPCS

## 2025-04-17 PROCEDURE — 77012 CT SCAN FOR NEEDLE BIOPSY: CPT

## 2025-04-17 PROCEDURE — 77012 CT SCAN FOR NEEDLE BIOPSY: CPT | Mod: 26,RT,, | Performed by: RADIOLOGY

## 2025-04-17 RX ORDER — LORATADINE 10 MG/1
10 TABLET ORAL DAILY PRN
COMMUNITY

## 2025-04-17 RX ORDER — IBUPROFEN 200 MG
200 TABLET ORAL EVERY 6 HOURS PRN
COMMUNITY

## 2025-04-17 RX ORDER — FENTANYL CITRATE 50 UG/ML
INJECTION, SOLUTION INTRAMUSCULAR; INTRAVENOUS
Status: DISPENSED
Start: 2025-04-17 | End: 2025-04-17

## 2025-04-17 RX ORDER — LIDOCAINE HYDROCHLORIDE 10 MG/ML
1 INJECTION, SOLUTION EPIDURAL; INFILTRATION; INTRACAUDAL; PERINEURAL ONCE AS NEEDED
Status: DISCONTINUED | OUTPATIENT
Start: 2025-04-17 | End: 2025-04-18 | Stop reason: HOSPADM

## 2025-04-17 RX ORDER — MIDAZOLAM HYDROCHLORIDE 2 MG/2ML
INJECTION, SOLUTION INTRAMUSCULAR; INTRAVENOUS
Status: DISPENSED
Start: 2025-04-17 | End: 2025-04-17

## 2025-04-17 RX ORDER — LIDOCAINE HYDROCHLORIDE 10 MG/ML
INJECTION, SOLUTION INFILTRATION; PERINEURAL
Status: COMPLETED
Start: 2025-04-17 | End: 2025-04-17

## 2025-04-17 RX ORDER — MIDAZOLAM HYDROCHLORIDE 1 MG/ML
INJECTION, SOLUTION INTRAMUSCULAR; INTRAVENOUS
Status: COMPLETED | OUTPATIENT
Start: 2025-04-17 | End: 2025-04-17

## 2025-04-17 RX ORDER — FENTANYL CITRATE 50 UG/ML
INJECTION, SOLUTION INTRAMUSCULAR; INTRAVENOUS
Status: COMPLETED | OUTPATIENT
Start: 2025-04-17 | End: 2025-04-17

## 2025-04-17 RX ADMIN — FENTANYL CITRATE 25 MCG: 50 INJECTION INTRAMUSCULAR; INTRAVENOUS at 10:04

## 2025-04-17 RX ADMIN — LIDOCAINE HYDROCHLORIDE 100 MG: 10 INJECTION, SOLUTION INFILTRATION; PERINEURAL at 10:04

## 2025-04-17 RX ADMIN — FENTANYL CITRATE 50 MCG: 50 INJECTION INTRAMUSCULAR; INTRAVENOUS at 09:04

## 2025-04-17 RX ADMIN — MIDAZOLAM 2 MG: 1 INJECTION INTRAMUSCULAR; INTRAVENOUS at 09:04

## 2025-04-17 RX ADMIN — MIDAZOLAM 1 MG: 1 INJECTION INTRAMUSCULAR; INTRAVENOUS at 10:04

## 2025-04-17 NOTE — DISCHARGE INSTRUCTIONS
OK TO REMOVE DRESSING AND SHOWER WILLIAM        General Information:    1.  Do not drink alcoholic beverages including beer for 24 hours or as long as you are on pain medication..  2.  Do not drive a motor vehicle, operate machinery or power tools, or signs legal papers for 24 hours or as long as you are on pain medication.   3.  You may experience light-headedness, dizziness, and sleepiness following surgery. Please do not stay alone. A responsible adult should be with you for this 24 hour period.  4.  Go home and rest.    5. Progress slowly to a normal diet unless instructed.  Otherwise, begin with liquids such as soft drinks, then soup and crackers working up to solid foods. Drink plenty of nonalcoholic fluids.  6.  Certain anesthetics and pain medications produce nausea and vomiting in certain       individuals. If nausea becomes a problem at home, call you doctor.    7. A nurse will be calling you sometime after surgery. Do not be alarmed. This is our way of finding out how you are doing.    8. Several times every hour while you are awake, take 2-3 deep breaths and cough. If you had stomach surgery hold a pillow or rolled towel firmly against your stomach before you cough. This will help with any pain the cough might cause.  9. Several times every hour while you are awake, pump and flex your feet 5-6 times and do foot circles. This will help prevent blood clots.    10.Call your doctor for severe pain, bleeding, fever, or signs or symptoms of infection (pain, swelling, redness, foul odor, drainage).      We hope your stay was comfortable as you heal now, mend and rest.    For we have enjoyed taking care of you by giving your our best.    And as you get better, by regaining your health and strength;   We count it as a privilege to have served you and hope your time at [facility] was well spent.      Thank  You!!!

## 2025-04-17 NOTE — DISCHARGE SUMMARY
Radiology Discharge Summary      Admit date: 4/17/2025  7:49 AM  Discharge date: April 17, 2025    Instructions Given to patient: YesVerbal    Diet: Regular    Activity:NO Restrictions    Medications on discharge (List): Refer to Discharge Medication List    Hospital Course: Following informed consent and time out  the patient received moderate sedation Fentanyl 75 mcg and Versed 3 mg IV and Dr. Velasco and JAKI Choe monitored the patient for BP, pulse and pulse oximetry from 0955 until 1010.      Pt brought to CT and site over post right iliac crest  marked. Following local prep and anesthesia bone marrow needle advanced and aspiration and core  biopsy performed with adequate specimens. Pressure held for 2 minutes and bandage placed.     Description of Condition on Discharge: stable    Discharge Disposition: Home    Discharge Diagnosis: anemia    Patient to Follow up with Dr. Mccain

## 2025-04-17 NOTE — PLAN OF CARE
Pt prepared for surgery. Pt resting in bed, with family/friend at bedside. Family signed up for text messaging. Belongings in pt's room with familyFall risk agreement reviewed and armband placed. Allergy armband placed.

## 2025-04-21 ENCOUNTER — HOSPITAL ENCOUNTER (OUTPATIENT)
Dept: RADIOLOGY | Facility: HOSPITAL | Age: 50
Discharge: HOME | End: 2025-04-21
Attending: NURSE PRACTITIONER
Payer: MEDICAID

## 2025-04-21 DIAGNOSIS — R31.29 HEMATURIA, MICROSCOPIC: ICD-10-CM

## 2025-04-21 PROCEDURE — 74178 CT ABD&PLV WO CNTR FLWD CNTR: CPT | Mod: 26,,, | Performed by: RADIOLOGY

## 2025-04-21 PROCEDURE — 74178 CT ABD&PLV WO CNTR FLWD CNTR: CPT | Mod: TC

## 2025-04-21 PROCEDURE — 25500020 PHARM REV CODE 255

## 2025-04-21 RX ADMIN — IOHEXOL 125 ML: 350 INJECTION, SOLUTION INTRAVENOUS at 12:04

## 2025-04-22 ENCOUNTER — TELEPHONE (OUTPATIENT)
Dept: UROLOGY | Facility: CLINIC | Age: 50
End: 2025-04-22
Payer: MEDICAID

## 2025-04-22 ENCOUNTER — RESULTS FOLLOW-UP (OUTPATIENT)
Dept: UROLOGY | Facility: CLINIC | Age: 50
End: 2025-04-22

## 2025-04-22 DIAGNOSIS — R31.29 HEMATURIA, MICROSCOPIC: Primary | ICD-10-CM

## 2025-04-22 NOTE — TELEPHONE ENCOUNTER
----- Message from SANDRA Cummings sent at 4/22/2025  2:13 PM CDT -----  Regarding: Microhematuria patient  New pt referred for Microhematuria, 50 y/o femaleCTU workup:No hydronephrosis, opaque renal or ureteral stone or ureteral obstruction.  9 mm left renal cyst and tiny subcentimeter hypodensity left kidney also suggesting cyst.  Sclerotic appearance of most of the sacrum and of the spinous process of L2 as before differential including metastatic disease and lymphoma.  Additional findings as detailed above including prior cholecystectomy.Please advise if Cysto is needed to complete MH workup.  Thanks.

## 2025-04-22 NOTE — PROGRESS NOTES
Procedure Order to Urology [[id number]]    Electronically signed by: Andreina Guo MD on 04/22/25 1654 Status: Active   Ordering user: Andreina Guo MD 04/22/25 1654 Authorized by: Andreina Guo MD   Ordering mode: Standard   Frequency:  04/22/25 -     Diagnoses  Hematuria, microscopic [R31.29]   Questionnaire    Question Answer   Procedure Cystoscopy Comment - monday june 2   Facility Name: [facility], Please order Urine POCT without micro . Local sedation (no urine Dr Shah or Dr adrian)

## 2025-05-01 ENCOUNTER — TELEPHONE (OUTPATIENT)
Facility: CLINIC | Age: 50
End: 2025-05-01
Payer: MEDICAID

## 2025-05-01 NOTE — TELEPHONE ENCOUNTER
LVM to confirm appointment scheduled for 5/8 at 1:20 with DR. Mccain and remind pt to call the office to schedule labs that are due prior to visit.

## 2025-07-02 ENCOUNTER — TELEPHONE (OUTPATIENT)
Facility: CLINIC | Age: 50
End: 2025-07-02
Payer: MEDICAID

## 2025-07-09 ENCOUNTER — OFFICE VISIT (OUTPATIENT)
Facility: CLINIC | Age: 50
End: 2025-07-09
Payer: MEDICAID

## 2025-07-09 ENCOUNTER — LAB VISIT (OUTPATIENT)
Dept: LAB | Facility: HOSPITAL | Age: 50
End: 2025-07-09
Attending: INTERNAL MEDICINE
Payer: MEDICAID

## 2025-07-09 VITALS
RESPIRATION RATE: 16 BRPM | OXYGEN SATURATION: 95 % | WEIGHT: 191 LBS | TEMPERATURE: 97 F | SYSTOLIC BLOOD PRESSURE: 103 MMHG | HEART RATE: 92 BPM | HEIGHT: 68 IN | BODY MASS INDEX: 28.95 KG/M2 | DIASTOLIC BLOOD PRESSURE: 64 MMHG

## 2025-07-09 DIAGNOSIS — R93.7 ABNORMAL BONE XRAY: ICD-10-CM

## 2025-07-09 DIAGNOSIS — D51.8 DIETARY VITAMIN B12 DEFICIENCY ANEMIA: ICD-10-CM

## 2025-07-09 DIAGNOSIS — D63.8 ANEMIA IN OTHER CHRONIC DISEASES CLASSIFIED ELSEWHERE: Primary | ICD-10-CM

## 2025-07-09 DIAGNOSIS — D63.8 ANEMIA IN OTHER CHRONIC DISEASES CLASSIFIED ELSEWHERE: ICD-10-CM

## 2025-07-09 LAB
ABSOLUTE EOSINOPHIL (SMH): 0.16 K/UL
ABSOLUTE MONOCYTE (SMH): 0.31 K/UL (ref 0.3–1)
ABSOLUTE NEUTROPHIL COUNT (SMH): 2.2 K/UL (ref 1.8–7.7)
ALBUMIN SERPL-MCNC: 4.6 G/DL (ref 3.5–5.2)
ALP SERPL-CCNC: 60 UNIT/L (ref 55–135)
ALT SERPL-CCNC: 7 UNIT/L (ref 10–44)
ANION GAP (SMH): 6 MMOL/L (ref 8–16)
AST SERPL-CCNC: 25 UNIT/L (ref 10–40)
BASOPHILS # BLD AUTO: 0.06 K/UL
BASOPHILS NFR BLD AUTO: 1.5 %
BILIRUB SERPL-MCNC: 0.4 MG/DL (ref 0.1–1)
BUN SERPL-MCNC: 10 MG/DL (ref 6–20)
CALCIUM SERPL-MCNC: 9.7 MG/DL (ref 8.7–10.5)
CHLORIDE SERPL-SCNC: 99 MMOL/L (ref 95–110)
CO2 SERPL-SCNC: 31 MMOL/L (ref 23–29)
CREAT SERPL-MCNC: 1.2 MG/DL (ref 0.5–1.4)
ERYTHROCYTE [DISTWIDTH] IN BLOOD BY AUTOMATED COUNT: 12.7 % (ref 11.5–14.5)
FERRITIN SERPL-MCNC: 215.4 NG/ML (ref 20–300)
FOLATE SERPL-MCNC: 10.1 NG/ML (ref 4–24)
GFR SERPLBLD CREATININE-BSD FMLA CKD-EPI: 55 ML/MIN/1.73/M2
GLUCOSE SERPL-MCNC: 86 MG/DL (ref 70–110)
HCT VFR BLD AUTO: 30.6 % (ref 37–48.5)
HGB BLD-MCNC: 9.8 GM/DL (ref 12–16)
IMM GRANULOCYTES # BLD AUTO: 0 K/UL (ref 0–0.04)
IMM GRANULOCYTES NFR BLD AUTO: 0 % (ref 0–0.5)
INR PPP: 1 (ref 0.8–1.2)
LYMPHOCYTES # BLD AUTO: 1.29 K/UL (ref 1–4.8)
MCH RBC QN AUTO: 29.3 PG (ref 27–31)
MCHC RBC AUTO-ENTMCNC: 32 G/DL (ref 32–36)
MCV RBC AUTO: 91 FL (ref 82–98)
NUCLEATED RBC (/100WBC) (SMH): 0 /100 WBC
PLATELET # BLD AUTO: 428 K/UL (ref 150–450)
PMV BLD AUTO: 8 FL (ref 9.2–12.9)
POTASSIUM SERPL-SCNC: 4 MMOL/L (ref 3.5–5.1)
PROT SERPL-MCNC: 9.4 GM/DL (ref 6–8.4)
PROTHROMBIN TIME: 11.4 SECONDS (ref 9–12.5)
RBC # BLD AUTO: 3.35 M/UL (ref 4–5.4)
RELATIVE EOSINOPHIL (SMH): 4 % (ref 0–8)
RELATIVE LYMPHOCYTE (SMH): 32.3 % (ref 18–48)
RELATIVE MONOCYTE (SMH): 7.8 % (ref 4–15)
RELATIVE NEUTROPHIL (SMH): 54.4 % (ref 38–73)
RETICS/RBC NFR AUTO: 1.3 % (ref 0.5–2.5)
SODIUM SERPL-SCNC: 136 MMOL/L (ref 136–145)
VIT B12 SERPL-MCNC: 357 PG/ML (ref 210–950)
WBC # BLD AUTO: 4 K/UL (ref 3.9–12.7)

## 2025-07-09 PROCEDURE — 82746 ASSAY OF FOLIC ACID SERUM: CPT

## 2025-07-09 PROCEDURE — 80053 COMPREHEN METABOLIC PANEL: CPT

## 2025-07-09 PROCEDURE — 82668 ASSAY OF ERYTHROPOIETIN: CPT

## 2025-07-09 PROCEDURE — 85045 AUTOMATED RETICULOCYTE COUNT: CPT

## 2025-07-09 PROCEDURE — 99214 OFFICE O/P EST MOD 30 MIN: CPT | Mod: PBBFAC,PN | Performed by: INTERNAL MEDICINE

## 2025-07-09 PROCEDURE — 85610 PROTHROMBIN TIME: CPT

## 2025-07-09 PROCEDURE — 82784 ASSAY IGA/IGD/IGG/IGM EACH: CPT | Mod: 91

## 2025-07-09 PROCEDURE — 85025 COMPLETE CBC W/AUTO DIFF WBC: CPT

## 2025-07-09 PROCEDURE — 82728 ASSAY OF FERRITIN: CPT

## 2025-07-09 PROCEDURE — 82784 ASSAY IGA/IGD/IGG/IGM EACH: CPT | Mod: 59

## 2025-07-09 PROCEDURE — 82607 VITAMIN B-12: CPT

## 2025-07-09 PROCEDURE — 36415 COLL VENOUS BLD VENIPUNCTURE: CPT

## 2025-07-09 PROCEDURE — 99999 PR PBB SHADOW E&M-EST. PATIENT-LVL IV: CPT | Mod: PBBFAC,,, | Performed by: INTERNAL MEDICINE

## 2025-07-09 PROCEDURE — 84165 PROTEIN E-PHORESIS SERUM: CPT

## 2025-07-11 LAB
IGA SERPL-MCNC: 396 MG/DL (ref 87–352)
IGA SERPL-MCNC: 400 MG/DL (ref 87–352)
IGG SERPL-MCNC: 2336 MG/DL (ref 586–1602)
IGG SERPL-MCNC: 2477 MG/DL (ref 586–1602)
IGM SERPL-MCNC: 169 MG/DL (ref 26–217)
IGM SERPL-MCNC: 169 MG/DL (ref 26–217)
PROT PATTERN SERPL IFE-IMP: ABNORMAL

## 2025-07-12 LAB
ALBUMIN SERPL ELPH-MCNC: 3.4 G/DL (ref 2.9–4.4)
ALBUMIN/GLOB SERPL: 0.6 {RATIO} (ref 0.7–1.7)
ALPHA1 GLOB SERPL ELPH-MCNC: 0.3 G/DL (ref 0–0.4)
ALPHA2 GLOB SERPL ELPH-MCNC: 1 G/DL (ref 0.4–1)
B-GLOBULIN SERPL ELPH-MCNC: 1.5 G/DL (ref 0.7–1.3)
EPO SERPL-ACNC: 24 MIU/ML (ref 2.6–18.5)
GAMMA GLOB SERPL ELPH-MCNC: 2.4 G/DL (ref 0.4–1.8)
GLOBULIN SER CALC-MCNC: 5.3 G/DL (ref 2.2–3.9)
LABORATORY COMMENT REPORT: ABNORMAL
M PROTEIN SERPL ELPH-MCNC: ABNORMAL G/DL
PROT SERPL-MCNC: 8.7 G/DL (ref 6–8.5)

## 2025-07-19 NOTE — PROGRESS NOTES
[facility] [address] hEMATOLOGY oNCOLOGY iN oFFICE sUBSEQUENT eNCOUNTER nOTE    7/9/25    Subjective:      Patient ID:   Robina Potter                                         49 y.o. female                                             1975  Sergey Velázquez MD    Chief Complaint:   Anemia, LBP    HPI:  49 y.o. female with anemia 2nd decreased cellularity of bone marrow.  Chronic low back pain sx +.  Applies heat with relief.  Admits to increased LBP sx, pain in hand and knee joints  with damp, cold weather of last few days.    She had percutaneous needle biopsy of left inguinal lymph node.  The pathology report showed granulomatous lymphadenitis with noncaseating granuloma.  Differential diagnosis included sarcoidosis, infection, and lymphoma.  Radiologist recommended open biopsy with additional tissue to rule out lymphoma.    I made a referral for her to see Dr. Rendon for open lymph node biopsy.  Specimen would be submitted for AFB and Gram stain,  AFB culture and routine culture and flow cytometry studies for leukemia lymphoma phenotyping submitted on a fresh lymph node biopsy,   not placed in formalin.  Also the lymph node biopsy would be submitted for routine H and E stain per pathology.    I spoke with Dr. Alba of pathology.  The flow cytometry studies did not show lymphoma.  Dr. Marcos will be issuing the final pathology report.  The culture from the lymph node biopsy returned 3+ positive.  Achromobacter xylosoxidans.  She may have systemic involvement with this organism with lymphadenopathy and bone involvement.  Will make a referral to Infectious Disease specialist to see how this will be treated?    Anemia re evaluation showed Hgb 10.9, results reviewed with pt.  Anemia of chronic dx.  Polyclonal gammopathy.    She saw Dr. Vick of ID in [place].  He does not think this is TB.  He has given her antibiotics x's 14 days.  Overall pain sx are improved.    She lost her antibiotics, we called her  pharmacist and reordered her bactrim BID for her.  We received call from [facility], that she has AFB on C/S of LN.    GERD +.  Pyelonephritis +.  Fibroids hx.  Recurrant UTI.  Cystoscopy done.    Seen for chest pain sx, CAT negative for PE.  Dr. Strong.  She has GI sx, on meds per Dr. Strong..  She saw Dr. Strong, may need hernia repair.  Saw Dr. Spence for migraine eval? Sx better.  Bladder eval negative.  Dr. Méndez.  She denies bladder sx now.    UTI sx, refill macrobid x's 7 days.  Refill meds for pain control.    Smoke 1/3 ppd.  Etoh no.  Job, security worker.    Cholecystectomy +, M0.    Mom  SLE.  1 child cerebral palsy.    Last seen 1-1/2 years ago.  She recently had a CT scan looking for kidney stones and this shows sclerotic area at the L2 spine and at the left sacrum, raising question of malignancy.  Discussed with her and will schedule bone marrow aspiration and biopsy at the left sacrum.  She is also due for her mammogram at [facility] and she will follow-up here in 6 weeks' time.    Hgb 9.8, B 12 357,  polyclonal gammopathy.  Bone marrow variable cellularity, 25-45%.  Slight reticulin increase.  Fe stores present.    Anemia can be due to B 12 deficiency, try to maintain > 400.  Also chronic Dx is considered.  Also anemia 2nd decreased cellularity.  Check for PNH on next lab.      ROS:   GEN: normal without any fever, night sweats or weight loss  HEENT:  HA's better  CV: normal with no CP, SOB, PND, SIMON or orthopnea  PULM: See HPI.  GI: GERD.  See history of present illness.  : Pyelonephritis hx. SEE HPI.  BREAST: normal with no mass, discharge, pain  SKIN: normal with no rash, erythema.    Review of patient's allergies indicates:  No Known Allergies    Current Outpatient Medications:     dicyclomine (BENTYL) 20 mg tablet, Take 1 tablet (20 mg total) by mouth 2 (two) times daily as needed (abdominal pain)., Disp: 30 tablet, Rfl: 0    ibuprofen (ADVIL,MOTRIN) 200 MG tablet, Take 200 mg by mouth every 6  "(six) hours as needed for Pain., Disp: , Rfl:     loratadine (CLARITIN) 10 mg tablet, Take 10 mg by mouth daily as needed for Allergies., Disp: , Rfl:     meloxicam (MOBIC) 7.5 MG tablet, Take 7.5 mg by mouth once daily., Disp: , Rfl:     diclofenac sodium (VOLTAREN) 1 % Gel, Apply 2 g topically 4 (four) times daily. (Patient not taking: Reported on 7/9/2025), Disp: 350 g, Rfl: 0    HYDROcodone-acetaminophen (NORCO) 7.5-325 mg per tablet, Take 1 tablet by mouth every 6 (six) hours as needed for Pain., Disp: 120 tablet, Rfl: 0          Objective:   Vitals:  Blood pressure 103/64, pulse 92, temperature 97.4 °F (36.3 °C), temperature source Temporal, resp. rate 16, height 5' 8" (1.727 m), weight 86.6 kg (191 lb), SpO2 95%.    Physical Examination:   GEN: no apparent distress, comfortable  HEAD: atraumatic and normocephalic  EYES: no pallor, no icterus  ENT: no pharyngeal erythema, external ears WNL; no nasal discharge  NECK: no masses, thyroid normal, trachea midline, no LAD/LN's, supple  CV: RRR with no murmur; normal pulse; normal S1 and S2; no pedal edema  CHEST: Normal respiratory effort; CTAB;  no wheeze or crackles.  I believe the soft tissue over the sternum is full, towards the R chest wall.  ABDOM: nontender and nondistended; soft;  no rebound/guarding, L/S NP  MUSC/Skeletal: ROM normal; no crepitus; joints normal; no deformities  EXTREM: no clubbing, cyanosis, inflammation   SKIN: no rashes.  : no CVAT.  NEURO: grossly intact; motor/sensory WNL; no tremors  PSYCH: normal mood, affect and behavior  LYMPH: L inguinal LN      Radiology/Diagnostic Studies:    Mamm 1/2018 negative.  Bone marrow 2014, 25% cellularity.  IgG 2,000 now at 1862.  Hgb 9.5 WBC 4500, plt cnt 598,000.  Polyclonal gammopathy    CAT of chest mottled sternum, 2.2 axillary LN  Bone Scan multiple gilberto of uptake including sacrum.  CAT diffuse LN, sclerotic bone lesions  Mamm YONATHAN    Total protein 9.7, Hgb 10.4, lab eval ordered.    CT scan " showed L2 sclerotic area and left spine sclerotic area, raising the question of metastatic disease.  Or malignancy.    Assessment:   (1) 49 y.o. female with chronic anemia secondary to decreased bone marrow cellularity.  Blood counts low but stable.   Anemia of chronic dx.     (2)Chronic LBP.  Fullness at R sternal area.  Abnormal Bone Scan, possible metastatic dx.    Discussed with radiologist.  Bx of L iliac LN showed granulomatous lymphadenopathy with noncaseating granuloma.  Differential diagnosis includes sarcoidosis, infectious lymphadenopathy, and non-Hodgkin's lymphoma.    Dr. Rendon for open biopsy of left inguinal lymph node.  Sample of the lymph node biopsy, not placed in formalin, for AFB stain and Gram stain and AFB C/S and routine C/S.  Flow cytometry studies on the lymph node biopsy for leukemia lymphoma phenotyping were negative.    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  Dr. Vick. Of ID has seen her and is treating her with antibiotics.  [facility] called, that she had AFB on C/S of R inguinal LN.    Appt with Dr. Adams, for eval and management for Sarcoid?  Appt with Dr. Malave, for review of LN C/S, AFB status.  She is not compliant in seeing specialists, she does keep Dr. Velázquez appt.    Polyclonal gammopathy  Anemia chronic Dx. Or decreased cellularity.    Bone Marrow as above in HPI.  Check for PNH.  Dr. Guo to do cystoscopy for hematuria eval.    RTC 8 weeks.  Paramjit Maldonado MD  [facility] 7/9/25